# Patient Record
Sex: FEMALE | Race: BLACK OR AFRICAN AMERICAN | HISPANIC OR LATINO | URBAN - METROPOLITAN AREA
[De-identification: names, ages, dates, MRNs, and addresses within clinical notes are randomized per-mention and may not be internally consistent; named-entity substitution may affect disease eponyms.]

---

## 2019-10-28 ENCOUNTER — TRANSFERRED RECORDS (OUTPATIENT)
Dept: HEALTH INFORMATION MANAGEMENT | Facility: CLINIC | Age: 29
End: 2019-10-28

## 2019-10-28 LAB
HBV SURFACE AG SERPL QL IA: NONREACTIVE
HIV 1+2 AB+HIV1 P24 AG SERPL QL IA: NONREACTIVE
RUBELLA ANTIBODY IGG QUANTITATIVE: NORMAL IU/ML

## 2019-10-29 ENCOUNTER — TRANSFERRED RECORDS (OUTPATIENT)
Dept: MATERNAL FETAL MEDICINE | Facility: CLINIC | Age: 29
End: 2019-10-29

## 2019-10-30 ENCOUNTER — MEDICAL CORRESPONDENCE (OUTPATIENT)
Dept: MATERNAL FETAL MEDICINE | Facility: CLINIC | Age: 29
End: 2019-10-30

## 2019-10-31 ENCOUNTER — TRANSCRIBE ORDERS (OUTPATIENT)
Dept: MATERNAL FETAL MEDICINE | Facility: CLINIC | Age: 29
End: 2019-10-31

## 2019-10-31 ENCOUNTER — DOCUMENTATION ONLY (OUTPATIENT)
Dept: MATERNAL FETAL MEDICINE | Facility: CLINIC | Age: 29
End: 2019-10-31

## 2019-10-31 DIAGNOSIS — O30.109 TRIPLET PREGNANCY, ANTEPARTUM, UNSPECIFIED MULTIPLE GESTATION TYPE: Primary | ICD-10-CM

## 2019-10-31 DIAGNOSIS — O26.90 PREGNANCY RELATED CONDITION, ANTEPARTUM: Primary | ICD-10-CM

## 2019-11-01 ENCOUNTER — PRE VISIT (OUTPATIENT)
Dept: MATERNAL FETAL MEDICINE | Facility: CLINIC | Age: 29
End: 2019-11-01

## 2019-11-06 ENCOUNTER — OFFICE VISIT (OUTPATIENT)
Dept: MATERNAL FETAL MEDICINE | Facility: CLINIC | Age: 29
End: 2019-11-06
Payer: COMMERCIAL

## 2019-11-06 ENCOUNTER — HOSPITAL ENCOUNTER (OUTPATIENT)
Dept: ULTRASOUND IMAGING | Facility: CLINIC | Age: 29
Discharge: HOME | End: 2019-11-06
Admitting: NURSE PRACTITIONER
Payer: COMMERCIAL

## 2019-11-06 DIAGNOSIS — O30.109 TRIPLET PREGNANCY, ANTEPARTUM, UNSPECIFIED MULTIPLE GESTATION TYPE: Primary | ICD-10-CM

## 2019-11-06 DIAGNOSIS — O30.109 TRIPLET PREGNANCY, ANTEPARTUM, UNSPECIFIED MULTIPLE GESTATION TYPE: ICD-10-CM

## 2019-11-06 PROCEDURE — 76801 OB US < 14 WKS SINGLE FETUS: CPT

## 2019-11-06 NOTE — PROGRESS NOTES
Pt presents to [facility] for triplet pregnancy. Us done and reviewed by Dr. Beck. See epic for today's findings. Pt will return in 1 week for repeat us. Questions answered. Discharged stable at this time. Rowan Tavares RN

## 2019-11-11 ENCOUNTER — OFFICE VISIT (OUTPATIENT)
Dept: MATERNAL FETAL MEDICINE | Facility: CLINIC | Age: 29
End: 2019-11-11
Attending: OBSTETRICS & GYNECOLOGY
Payer: COMMERCIAL

## 2019-11-11 ENCOUNTER — HOSPITAL ENCOUNTER (OUTPATIENT)
Dept: ULTRASOUND IMAGING | Facility: CLINIC | Age: 29
End: 2019-11-11
Attending: OBSTETRICS & GYNECOLOGY
Payer: COMMERCIAL

## 2019-11-11 DIAGNOSIS — O30.101 TRIPLET GESTATION IN FIRST TRIMESTER, UNSPECIFIED MULTIPLE GESTATION TYPE: Primary | ICD-10-CM

## 2019-11-11 PROCEDURE — 76801 OB US < 14 WKS SINGLE FETUS: CPT

## 2019-11-20 ENCOUNTER — HOSPITAL ENCOUNTER (OUTPATIENT)
Dept: ULTRASOUND IMAGING | Facility: CLINIC | Age: 29
Discharge: HOME | End: 2019-11-20
Attending: OBSTETRICS & GYNECOLOGY | Admitting: OBSTETRICS & GYNECOLOGY
Payer: COMMERCIAL

## 2019-11-20 ENCOUNTER — OFFICE VISIT (OUTPATIENT)
Dept: MATERNAL FETAL MEDICINE | Facility: CLINIC | Age: 29
End: 2019-11-20
Attending: OBSTETRICS & GYNECOLOGY
Payer: COMMERCIAL

## 2019-11-20 DIAGNOSIS — O30.101 TRIPLET GESTATION IN FIRST TRIMESTER, UNSPECIFIED MULTIPLE GESTATION TYPE: ICD-10-CM

## 2019-11-20 DIAGNOSIS — O30.111 TRIPLET GESTATION WITH TWO OR MORE MONOCHORIONIC FETUSES IN FIRST TRIMESTER: Primary | ICD-10-CM

## 2019-11-20 PROCEDURE — 76801 OB US < 14 WKS SINGLE FETUS: CPT

## 2019-11-20 NOTE — NURSING NOTE
Sobeida, accompanied by her SO Mathew, seen in clinic today for NT US at 12w2d gestation for pregnancy c/b Dri/Tri triplets (see reports/notes). Dr. Bowens in to see pt, reviewed US findings and POC. Plan is to follow up in 2 weeks for a limited US and 1st OB visit (also to have formal triplet consult). PCC contact card given to call with any questions or concerns. Sobeida scheduled appointments, discharged ambulatory and stable.  Juana Kelly RN

## 2019-11-21 NOTE — PROGRESS NOTES
"Please see \"Imaging\" tab under \"Chart Review\" for details of today's ultrasound.    Terrell Bowens M.D.  Specialist in Maternal-Fetal Medicine     "

## 2019-12-02 ENCOUNTER — OFFICE VISIT (OUTPATIENT)
Dept: MATERNAL FETAL MEDICINE | Facility: CLINIC | Age: 29
End: 2019-12-02
Attending: OBSTETRICS & GYNECOLOGY
Payer: COMMERCIAL

## 2019-12-02 ENCOUNTER — HOSPITAL ENCOUNTER (OUTPATIENT)
Dept: ULTRASOUND IMAGING | Facility: CLINIC | Age: 29
Discharge: HOME | End: 2019-12-02
Attending: OBSTETRICS & GYNECOLOGY | Admitting: OBSTETRICS & GYNECOLOGY
Payer: COMMERCIAL

## 2019-12-02 VITALS
HEIGHT: 67 IN | WEIGHT: 222.2 LBS | SYSTOLIC BLOOD PRESSURE: 117 MMHG | OXYGEN SATURATION: 97 % | BODY MASS INDEX: 34.88 KG/M2 | HEART RATE: 84 BPM | RESPIRATION RATE: 16 BRPM | DIASTOLIC BLOOD PRESSURE: 61 MMHG

## 2019-12-02 DIAGNOSIS — O30.111 TRIPLET GESTATION WITH TWO OR MORE MONOCHORIONIC FETUSES IN FIRST TRIMESTER: Primary | ICD-10-CM

## 2019-12-02 DIAGNOSIS — O30.111 TRIPLET GESTATION WITH TWO OR MORE MONOCHORIONIC FETUSES IN FIRST TRIMESTER: ICD-10-CM

## 2019-12-02 PROCEDURE — G0463 HOSPITAL OUTPT CLINIC VISIT: HCPCS

## 2019-12-02 PROCEDURE — 76815 OB US LIMITED FETUS(S): CPT

## 2019-12-02 RX ORDER — ASPIRIN 81 MG/1
81 TABLET ORAL DAILY
Status: ON HOLD | COMMUNITY
End: 2020-04-18

## 2019-12-02 ASSESSMENT — MIFFLIN-ST. JEOR: SCORE: 1775.52

## 2019-12-02 NOTE — NURSING NOTE
Sobeida seen in clinic today accompanied by partner and daughter for OB visit at 14w 0d gestation for pregnancy complicated by di/tri triplets (see report/notes). VSS. Pt reports + fetal movement. Pt denies bldg/lof/change in discharge/contractions/headache/vision changes/chest pain/SOB/edema. Pt given new OB folder and contact information, information reviewed, pt VU. Pt reports sciatic pain, offered PT, patient declined. Pt also reports pain during intercourse, declined exam by Dr. Beck. Dr. Beck met with pt and discussed POC, see note. Plan to continue every other week ultrasounds with cervical lengths/TTTS and OBV. Pt will start ASA today and plan for valtrex at 32 weeks. Will do GCT at next visit and 24 weeks. Future visits scheduled. Pt discharged stable and ambulatory.      Shanta Cook RN

## 2019-12-03 NOTE — PROGRESS NOTES
"MFM NOB Visit.     CC: Di/Tri triplet pregnancy    Sobeida is a 28 y/o  @ 14 0/7 weeks with a known Dichorionic/Triamniotic Triplet gestation based on LMP c/w 8 week US. Spontaneous conception.  Initially triplet 3 (ellis) was complicated by lagging growth from 8-11 weeks.  At time of NT assessment Triplet 3 had a CRL >45 mm.  All fetuses had normal NT measurements and NB are present.     She feels well today.  Does have low pelvic pain with intercourse.  No other complaints.  No N/V.  Not interested in multifetal pregnancy reduction.  Has completed a lot of online reading about the mono/di twins and the potential complications.      Pregnancy complicated by:  - Dichorionic Triamniontic Triplet Gestation  - Obesity- BMI 34  - Genital HSV    Obstetrics History:  - Full term   Prior induced ab     Gynecologic History:  - Hx of HSV  - PAP 2019- wnl.      Past Surgical History:  - [place] teeth  - Breast augmentation   - abdominal liposuction     Current Medications:  Prenatal vitamin     Allergies:  NKDA     Social History:   Occupation: flexible job  Denies t/e/d use  Good social support from FOB     Family History:  Denies history of genetic disorders, preeeclampsia, thromboembolic disease, bleeding disorders, mental retardation     ROS:  10-point ROS negative except as in HPI      PHYSICAL EXAM:  /61 (BP Location: Left arm, Patient Position: Sitting, Cuff Size: Adult Large)   Pulse 84   Resp 16   Ht 1.702 m (5' 7\")   Wt 100.8 kg (222 lb 3.2 oz)   LMP 2019   SpO2 97%   BMI 34.80 kg/m       Gen: NAD, well appearing  CV: Regular rate  Pulm: Non-labored breathing  Abdomen: Gravid, non-tender, non-distended  Extremities: no edema    See initial exam in Media from [place] [facility] for further details.      ASSESSMENT/PLAN:  Sobeida Fountain is a 28 y/o  @ 14 0/7 weeks with Di/Tri triplet gestation here for NOB visit.      # Prenatal care:  - PNC: Prenatal labs WNL. See " Lab tab for details.   - Genetics: understands limitations of genetic testing on Triplets.  Had normal NT measurements and NB on all fetuses.  Declines invasive testing.   - Immunizations: s/p Flu vaccine on 10/28/19 at [place] [facility]. Tdap at 28 weeks  - Consults: NICU   - Mode of delivery:  section at 35-36 weeks gestation or sooner as clinically indicated  - Prophylaxis: Aspirin (81 mg) daily staring at 12 weeks gestation. Discussed today.      # Dichorionic triamniotic pregnancy  - Discussed maternal risks of triplet gestation including  delivery, gestational diabetes, gestational hypertension and preeclampsia. We also discussed increased risk for requiring a  delivery and its attendant risks for hemorrhage and infection compared with vaginal delivery. Although the relative risks for these complications are relatively small, they are significant.   - Discussed the fetal and  risks including but not limited to  delivery and its complications of CP, IVH, NEC, RDS, and  death. The patient related risk with triplets without a history of  delivery is approximately 3x background risk for women with ellis pregnancy and without history of  delivery (10-12%).   - Multifetal pregnancy reduction was discussed. She and her partner decline reduction and wish to proceed with triplet pregnancy.  - Discussed recommendations of low dose aspirin for preeclampsia prevention, ideally started between 12-16 weeks  - Discussed early screening for gestational diabetes (will do next visit) and again at 28 weeks gestation.  - Discussed risk for cervical shortening and need for inpatient surveillance if cervical shortening is detected after viability is reached, given the limited options for prevention of  birth in this population, the emphasis is on preparing the fetuses for  birth if there is imminent risk of  birth as well as transfer to center with  NICU availability at the corresponding gestational age.  Will plan for cervical length assessment starting at 16 weeks gestation.  - Discussed increased nutritional requirements for pregnancy including PNV, calcium with vitamin supplementation and iron supplementation. Patient is currently taking 2 PNV.   - Discussed recommended weight gain in pregnancy for triplet pregnancy is between 30-50 Ibs.  - Discussed recommendation for detailed fetal anatomy scan at 18-20 weeks.  - Discussed recommendations for fetal growth scans every 4 weeks unless evidence of SGA develops in any of the fetuses.   - Discussed recommendation for  section between 35-36 weeks gestation if not clinically indicated before that time. Reviewed that the average delivery time is often between 30-32 weeks gestation.     # Mono/Di twin gestation  We also reviewed the unique complications associated with Mono/Di twins including increased risk for birth defects and recommendation for fetal echos at 20-22 weeks.  We discussed TTTS and selective fetal growth restriction.  Both of these conditions develop in 10-15% of Mono/Di twin pregnancies.  We reviewed the screening for TTTS including q 2 week US for MVP, Dopplers beginning at 16 weeks.  Briefly reviewed the possible treatment options depending on gestational age of development including selective fetoscopic laser ablation and amnioreduction.  Discussed that an additional fetus may increase the risks for various procedures to be performed.  We also discussed sFGR and the management.     The patient was seen for an established outpatient visit.  I spent a total of 25 minutes face to face with Sobeida Fountain during today's visit. Over 50% of this time was spent counseling the patient and/or coordinating care Di/Tri triplet gestation.     Leni Beck DO [facility]  Maternal Fetal Medicine Specialist  Pager: [phone]  Mobile: [phone]

## 2019-12-10 ENCOUNTER — TELEPHONE (OUTPATIENT)
Dept: MATERNAL FETAL MEDICINE | Facility: CLINIC | Age: 29
End: 2019-12-10

## 2019-12-10 NOTE — TELEPHONE ENCOUNTER
Pt calling to inquire about flying/traveling in the month of January. Pt is pregnant with triplets, 1&2 mono/di pair, currently 15w1d. Dr. Beck informed and advised pt not to fly or travel due to nature of triplet pregnancy with mono/di twin pair.      Angelika Lopez RN

## 2019-12-18 ENCOUNTER — OFFICE VISIT (OUTPATIENT)
Dept: MATERNAL FETAL MEDICINE | Facility: CLINIC | Age: 29
End: 2019-12-18
Attending: OBSTETRICS & GYNECOLOGY
Payer: COMMERCIAL

## 2019-12-18 ENCOUNTER — HOSPITAL ENCOUNTER (OUTPATIENT)
Dept: ULTRASOUND IMAGING | Facility: CLINIC | Age: 29
Discharge: HOME | End: 2019-12-18
Attending: OBSTETRICS & GYNECOLOGY | Admitting: OBSTETRICS & GYNECOLOGY
Payer: COMMERCIAL

## 2019-12-18 ENCOUNTER — TELEPHONE (OUTPATIENT)
Dept: MATERNAL FETAL MEDICINE | Facility: CLINIC | Age: 29
End: 2019-12-18

## 2019-12-18 VITALS
BODY MASS INDEX: 34.93 KG/M2 | WEIGHT: 223 LBS | DIASTOLIC BLOOD PRESSURE: 60 MMHG | OXYGEN SATURATION: 98 % | SYSTOLIC BLOOD PRESSURE: 105 MMHG | RESPIRATION RATE: 18 BRPM | HEART RATE: 105 BPM

## 2019-12-18 DIAGNOSIS — O30.111 TRIPLET GESTATION WITH TWO OR MORE MONOCHORIONIC FETUSES IN FIRST TRIMESTER: ICD-10-CM

## 2019-12-18 DIAGNOSIS — O99.210 OBESITY IN PREGNANCY: Primary | ICD-10-CM

## 2019-12-18 LAB — GLUCOSE 1H P 50 G GLC PO SERPL-MCNC: 130 MG/DL (ref 60–129)

## 2019-12-18 PROCEDURE — 76817 TRANSVAGINAL US OBSTETRIC: CPT | Performed by: OBSTETRICS & GYNECOLOGY

## 2019-12-18 PROCEDURE — 82950 GLUCOSE TEST: CPT | Performed by: OBSTETRICS & GYNECOLOGY

## 2019-12-18 PROCEDURE — G0463 HOSPITAL OUTPT CLINIC VISIT: HCPCS | Mod: 25,ZF

## 2019-12-18 PROCEDURE — 82952 GTT-ADDED SAMPLES: CPT | Performed by: OBSTETRICS & GYNECOLOGY

## 2019-12-18 PROCEDURE — 76805 OB US >/= 14 WKS SNGL FETUS: CPT

## 2019-12-18 PROCEDURE — 36415 COLL VENOUS BLD VENIPUNCTURE: CPT | Performed by: OBSTETRICS & GYNECOLOGY

## 2019-12-18 RX ORDER — CALCIUM CARBONATE 500 MG/1
1 TABLET, CHEWABLE ORAL 2 TIMES DAILY
COMMUNITY
End: 2023-09-16

## 2019-12-18 ASSESSMENT — PAIN SCALES - GENERAL: PAINLEVEL: NO PAIN (0)

## 2019-12-18 NOTE — TELEPHONE ENCOUNTER
Called to inform Sobeida of her 1 hour GCT result is 130 and that we will need her to do a 3 hour. Dr. Weiss aware. Instructed pt to do this before her next appointment with M. Pt agreeable to plan. Orders placed and instructed pt to be fasting and she can go to the lab M-F starting at 0730. Sobeida has no further questions, has PCC# to call if needed. Assured Sobeida we will watch for her results and notify her ASAP.   Juana Kelly RN

## 2019-12-18 NOTE — PROGRESS NOTES
MFM Prenatal Visit.     CC: Di/Tri triplet pregnancy    Sobeida is a 30 y/o  @ 16 2/7 weeks with a known Dichorionic/Triamniotic Triplet gestation based on LMP c/w 8 week US. Spontaneous conception.  Initially triplet 3 (ellis) was complicated by lagging growth from 8-11 weeks.  At time of NT assessment Triplet 3 had a CRL >45 mm.  All fetuses had normal NT measurements and NB are present.     She feels well today. No N/V.  Not interested in multifetal pregnancy reduction.  Has completed a lot of online reading about the mono/di twins and the potential complications.     We discussed findings on ultrasound today:  Fetal growth has been concordant between all three triplets, with fetus 2 of monochorionic pair (1 and 2) > fetus 1.    No evidence of TTTS or growth restriction.     Regarding the EIF in fetus 3, we discussed the limitations and decreased detection rates for  aneuploidy screening in triplet pregnancies. We reviewed option for diagnostic testing to detect aneuploidy. Patient declines testing. Discussed finding of echogenic intracardiac focus and its use as a soft marker for aneuploidy, specifically for Trisomy 21 in fetus 3. We discussed age adjusted risk for aneuploidy based on this soft marker. Patient had previously declined aneuploidy risk assessment and after offering genetic counseling and diagnostic testing, the patient declined. The patient expressed a clear understanding of our conversation.       Pregnancy complicated by:  - Dichorionic Triamniontic Triplet Gestation  - Obesity- BMI 34  - Genital HSV    Obstetrics History:  - Full term   Prior induced ab     Gynecologic History:  - Hx of HSV  - PAP 2019- wnl.      Past Surgical History:  - [place] teeth  - Breast augmentation   - abdominal liposuction     Current Medications:  Prenatal vitamin     Allergies:  NKDA     Social History:   Occupation: flexible job  Denies t/e/d use  Good social support from  FOB     Family History:  Denies history of genetic disorders, preeeclampsia, thromboembolic disease, bleeding disorders, mental retardation     ROS:  10-point ROS negative except as in HPI      PHYSICAL EXAM:  /60 (BP Location: Left arm, Patient Position: Sitting, Cuff Size: Adult Large)   Pulse 105   Resp 18   Wt 101.2 kg (223 lb)   LMP 2019   SpO2 98%   BMI 34.93 kg/m      Gen: NAD, well appearing       ASSESSMENT/PLAN:  Sobeida Fountain is a 30 y/o  @ 16 2/7 weeks with Di/Tri triplet gestation.      # Prenatal care:  - PNC: Prenatal labs WNL. See Lab tab for details.   - Genetics: understands limitations of genetic testing on Triplets.  Had normal NT measurements and NB on all fetuses.  Declines invasive testing.   - Immunizations: s/p Flu vaccine on 10/28/19 at [place] [facility]. Tdap at 28 weeks  - Consults: NICU   - Mode of delivery:  section at 35-36 weeks gestation or sooner as clinically indicated  - Prophylaxis: Aspirin (81 mg) daily staring at 12 weeks gestation. Discussed today.      # Dichorionic triamniotic pregnancy  - Discussed maternal risks of triplet gestation including  delivery, gestational diabetes, gestational hypertension and preeclampsia. We also discussed increased risk for requiring a  delivery and its attendant risks for hemorrhage and infection compared with vaginal delivery. Although the relative risks for these complications are relatively small, they are significant.   - Discussed the fetal and  risks including but not limited to  delivery and its complications of CP, IVH, NEC, RDS, and  death. The patient related risk with triplets without a history of  delivery is approximately 3x background risk for women with ellis pregnancy and without history of  delivery (10-12%).   - Multifetal pregnancy reduction was discussed. She and her partner decline reduction and wish to proceed with triplet  pregnancy.  - Discussed recommendations of low dose aspirin for preeclampsia prevention, ideally started between 12-16 weeks  - Discussed early screening for gestational diabetes (will do next visit) and again at 28 weeks gestation.  - Discussed risk for cervical shortening and need for inpatient surveillance if cervical shortening is detected after viability is reached, given the limited options for prevention of  birth in this population, the emphasis is on preparing the fetuses for  birth if there is imminent risk of  birth as well as transfer to center with NICU availability at the corresponding gestational age.  Will plan for cervical length assessment starting at 16 weeks gestation.  - Discussed increased nutritional requirements for pregnancy including PNV, calcium with vitamin supplementation and iron supplementation. Patient is currently taking 2 PNV.   - Discussed recommended weight gain in pregnancy for triplet pregnancy is between 30-50 Ibs.  - Discussed recommendation for detailed fetal anatomy scan at 18-20 weeks.  - Discussed recommendations for fetal growth scans every 4 weeks unless evidence of SGA develops in any of the fetuses.   - Discussed recommendation for  section between 35-36 weeks gestation if not clinically indicated before that time. Reviewed that the average delivery time is often between 30-32 weeks gestation.  We discussed that although the cervical length is adequate, pregnancy risk for PTB is dependent on multifetal pregnancy and is elevated despite normal cervical length.     # Mono/Di twin gestation  We also reviewed the unique complications associated with Mono/Di twins including increased risk for birth defects and recommendation for fetal echos at 20-22 weeks.  We discussed TTTS and selective fetal growth restriction.  Both of these conditions develop in 10-15% of Mono/Di twin pregnancies.  We reviewed the screening for TTTS including q 2 week US  for MVP, Dopplers beginning at 16 weeks.  Briefly reviewed the possible treatment options depending on gestational age of development including selective fetoscopic laser ablation and amnioreduction.  Discussed that an additional fetus may increase the risks for various procedures to be performed.  We also discussed sFGR and the management.     The patient was seen for an established outpatient visit.  I spent a total of 15 minutes face to face with Sobeida Fountain during today's visit. Over 50% of this time was spent counseling the patient and/or coordinating care Di/Tri triplet gestation.    Forrest Weiss M.D.  Maternal Fetal Medicine

## 2019-12-18 NOTE — NURSING NOTE
Sobeida, accompanied by her , seen in clinic today for a 2/3 complete US and OB visit at 16w2d gestation (see report/notes). VSS. Pt reports positive fetal movement for all 3 babies. Pt denies bldg/lof/change in discharge/contractions/headache/vision changes/chest pain/SOB/edema. Pt reports that her sciatic pain has improved but states her plevis continues to be sore. P[facility] and [facility] phone number contact card given. Dr. Weiss met with pt and discussed US and POC. Plan to return to clinic in 2 weeks for f/u US and OBV. 50g glucola given today and sent to lab for early 1 hour GCT. Pt discharged stable and ambulatory.    Juana Kelly RN

## 2019-12-23 ENCOUNTER — TELEPHONE (OUTPATIENT)
Dept: MATERNAL FETAL MEDICINE | Facility: CLINIC | Age: 29
End: 2019-12-23

## 2019-12-23 DIAGNOSIS — O99.210 OBESITY IN PREGNANCY: ICD-10-CM

## 2019-12-23 DIAGNOSIS — O30.111 TRIPLET GESTATION WITH TWO OR MORE MONOCHORIONIC FETUSES IN FIRST TRIMESTER: ICD-10-CM

## 2019-12-23 LAB
GLUCOSE 1H P 100 G GLC PO SERPL-MCNC: 174 MG/DL (ref 60–179)
GLUCOSE 2H P 100 G GLC PO SERPL-MCNC: 144 MG/DL (ref 60–154)
GLUCOSE 3H P 100 G GLC PO SERPL-MCNC: 109 MG/DL (ref 60–139)
GLUCOSE P FAST SERPL-MCNC: 78 MG/DL (ref 60–94)

## 2019-12-23 PROCEDURE — 36415 COLL VENOUS BLD VENIPUNCTURE: CPT | Performed by: OBSTETRICS & GYNECOLOGY

## 2019-12-23 PROCEDURE — 82952 GTT-ADDED SAMPLES: CPT | Performed by: OBSTETRICS & GYNECOLOGY

## 2019-12-23 PROCEDURE — 82951 GLUCOSE TOLERANCE TEST (GTT): CPT | Performed by: OBSTETRICS & GYNECOLOGY

## 2019-12-23 NOTE — TELEPHONE ENCOUNTER
Patient called in wondering about her 3hr GTT results. Patient passed her 3 hr GTT. Pt aware and happy.  Angelika Garduno RN

## 2019-12-23 NOTE — TELEPHONE ENCOUNTER
Patient called in with complaints of HSV outbreak. Patient reports she has Acyclovir 400 mg tablets at home. Patient was given 180 tablets as she was diagnosed earlier this summer and never took any pills. Patient was prescribed 400 mg BID, but wants to know what to take now that she has an outbreak.  Patient to take Acyclovir 400 mg TID for 7-10 days and then can go to 400mg BID for suppression for the remainder of pregnancy as we don't want pt to have outbreak in pregnancy.  Writer spoke with Dr. Flowers from [facility] for instructions on medication dosing. Patient verbalized understanding. Angelika Garduno RN

## 2019-12-30 ENCOUNTER — OFFICE VISIT (OUTPATIENT)
Dept: MATERNAL FETAL MEDICINE | Facility: CLINIC | Age: 29
End: 2019-12-30
Attending: OBSTETRICS & GYNECOLOGY
Payer: COMMERCIAL

## 2019-12-30 ENCOUNTER — HOSPITAL ENCOUNTER (OUTPATIENT)
Dept: ULTRASOUND IMAGING | Facility: CLINIC | Age: 29
Discharge: HOME | End: 2019-12-30
Attending: OBSTETRICS & GYNECOLOGY | Admitting: OBSTETRICS & GYNECOLOGY
Payer: COMMERCIAL

## 2019-12-30 VITALS
SYSTOLIC BLOOD PRESSURE: 120 MMHG | RESPIRATION RATE: 18 BRPM | DIASTOLIC BLOOD PRESSURE: 61 MMHG | OXYGEN SATURATION: 99 % | BODY MASS INDEX: 35.22 KG/M2 | WEIGHT: 224.9 LBS

## 2019-12-30 DIAGNOSIS — O30.111 TRIPLET GESTATION WITH TWO OR MORE MONOCHORIONIC FETUSES IN FIRST TRIMESTER: ICD-10-CM

## 2019-12-30 PROCEDURE — G0463 HOSPITAL OUTPT CLINIC VISIT: HCPCS | Mod: 25,ZF

## 2019-12-30 PROCEDURE — 76817 TRANSVAGINAL US OBSTETRIC: CPT | Performed by: OBSTETRICS & GYNECOLOGY

## 2019-12-30 PROCEDURE — 76820 UMBILICAL ARTERY ECHO: CPT | Performed by: OBSTETRICS & GYNECOLOGY

## 2019-12-30 PROCEDURE — 76816 OB US FOLLOW-UP PER FETUS: CPT | Mod: 59

## 2019-12-30 RX ORDER — ACYCLOVIR 400 MG/1
400 TABLET ORAL 2 TIMES DAILY
COMMUNITY
End: 2020-04-13

## 2019-12-30 ASSESSMENT — PAIN SCALES - GENERAL: PAINLEVEL: NO PAIN (0)

## 2020-01-04 NOTE — PROGRESS NOTES
Maternal-Fetal Medicine Prenatal Visit    Sobeida Fountain MRN# [id number]   Age: 29 year old  Estimated Date of Delivery: 2020            Gestational age: 18w0d YOB: 1990             Subjective:        Pt denies LOF, VB, CTX.  Reports + FM.   Recently had genital HSV outbreak- now on suppression.  Feeling well overall.           Objective:        /61 (BP Location: Left arm, Patient Position: Sitting, Cuff Size: Adult Large)   Resp 18   Wt 102 kg (224 lb 14.4 oz)   LMP 2019   SpO2 99%   BMI 35.22 kg/m         No results found for this or any previous visit (from the past 24 hour(s)).             Gen: NAD, alert, comfortable       Abdomen: Gravid, Soft, Non-tender          Assessment:        29 year old y.o.  at 18w0d by LMP       1) Di/Tri triplet gestation.  Continue q 2 week TTTS surveillance and cervical length US due to triplet pregnancy.  Fetus 1 with velamentous CI.       2) Recent HSV outbreak- on acyclovir suppression.        3) Failed early GCT, passed GTT.  Repeat GTT at 26 weeks.        4) Fetal echos on           Attestation:   The patient was seen for an established outpatient visit.  I spent a total of 10 minutes face to face with Sobeida Fountain during today's office visit.  Over (>50%) was spent on counseling the patient and/or coordinating care regarding Di/Tri triplet gestation.        Leni Beck, DO  Maternal-Fetal Medicine  January 3, 2020

## 2020-01-13 ENCOUNTER — HOSPITAL ENCOUNTER (OUTPATIENT)
Dept: ULTRASOUND IMAGING | Facility: CLINIC | Age: 30
Discharge: HOME | End: 2020-01-13
Attending: OBSTETRICS & GYNECOLOGY | Admitting: OBSTETRICS & GYNECOLOGY
Payer: COMMERCIAL

## 2020-01-13 ENCOUNTER — OFFICE VISIT (OUTPATIENT)
Dept: MATERNAL FETAL MEDICINE | Facility: CLINIC | Age: 30
End: 2020-01-13
Attending: OBSTETRICS & GYNECOLOGY
Payer: COMMERCIAL

## 2020-01-13 VITALS
DIASTOLIC BLOOD PRESSURE: 62 MMHG | SYSTOLIC BLOOD PRESSURE: 109 MMHG | BODY MASS INDEX: 35.37 KG/M2 | RESPIRATION RATE: 18 BRPM | WEIGHT: 225.8 LBS | OXYGEN SATURATION: 100 % | HEART RATE: 98 BPM

## 2020-01-13 DIAGNOSIS — O30.111 TRIPLET GESTATION WITH TWO OR MORE MONOCHORIONIC FETUSES IN FIRST TRIMESTER: ICD-10-CM

## 2020-01-13 PROCEDURE — 76820 UMBILICAL ARTERY ECHO: CPT | Performed by: OBSTETRICS & GYNECOLOGY

## 2020-01-13 PROCEDURE — 76812 OB US DETAILED ADDL FETUS: CPT

## 2020-01-13 PROCEDURE — G0463 HOSPITAL OUTPT CLINIC VISIT: HCPCS | Mod: 25,ZF

## 2020-01-13 ASSESSMENT — PAIN SCALES - GENERAL: PAINLEVEL: NO PAIN (0)

## 2020-01-13 NOTE — PROGRESS NOTES
Maternal-Fetal Medicine Prenatal Visit    Sobeida Fountain MRN# [id number]   Age: 29 year old  Estimated Date of Delivery: 2020            Gestational age: 20w0d YOB: 1990             Subjective:        Pt denies LOF, VB, CTX.  Reports + FM.   Complains of insomnia.          Objective:      LMP 2019          Gen: NAD, alert, comfortable       Abdomen: Gravid, Soft, Non-tender                 Assessment:        29 year old y.o.  at 20w0d by LMP       1) Di/Tri triplet gestation.  Continue q 2 week TTTS surveillance and cervical length US due to triplet pregnancy.  Fetus 1 with velamentous CI.       2) Recent HSV outbreak- on acyclovir suppression.        3) Failed early GCT, passed GTT.  Repeat GTT at 26 weeks.        4) Fetal echos on        5) Benadryl 25 mg at at bedtime for sleep induction          Attestation:   The patient was seen for an established outpatient visit.  I spent a total of 10 minutes face to face with Sobeida Fountain during today's office visit.  Over (>50%) was spent on counseling the patient and/or coordinating care regarding Di/Tri triplet gestation.        Forrest Weiss  Maternal Fetal Medicine

## 2020-01-13 NOTE — NURSING NOTE
Sobeida seen in clinic today for L2/TV US and OB visit at 20w0d gestation for pregnancy complicated by di/tri triplets (see report/notes). VSS. Pt reports positive fetal movement x3. Pt denies bldg/lof/change in discharge/contractions/headache/vision changes/chest pain/SOB/edema. Sobeida reports feelings of increased pelvic pressure and she is having trouble sleeping. Dr. Weiss met with pt and discussed US and POC. Plan for TTTS/TV US, Fetal Echos and OB visit in 2 weeks. Encouraged pt to try a pregnancy belt to help with increased pelvic pressure and Benadryl 25 mg at HS to help sleeping.  Future visits already scheduled. Pt discharged stable and ambulatory.    Juana Kelly RN

## 2020-01-27 ENCOUNTER — HOSPITAL ENCOUNTER (OUTPATIENT)
Dept: ULTRASOUND IMAGING | Facility: CLINIC | Age: 30
Discharge: HOME | End: 2020-01-27
Attending: OBSTETRICS & GYNECOLOGY | Admitting: OBSTETRICS & GYNECOLOGY
Payer: COMMERCIAL

## 2020-01-27 ENCOUNTER — OFFICE VISIT (OUTPATIENT)
Dept: MATERNAL FETAL MEDICINE | Facility: CLINIC | Age: 30
End: 2020-01-27
Payer: COMMERCIAL

## 2020-01-27 VITALS
WEIGHT: 227.5 LBS | RESPIRATION RATE: 16 BRPM | OXYGEN SATURATION: 99 % | SYSTOLIC BLOOD PRESSURE: 119 MMHG | HEART RATE: 103 BPM | BODY MASS INDEX: 35.63 KG/M2 | DIASTOLIC BLOOD PRESSURE: 79 MMHG

## 2020-01-27 DIAGNOSIS — O30.109: ICD-10-CM

## 2020-01-27 DIAGNOSIS — O30.111 TRIPLET GESTATION WITH TWO OR MORE MONOCHORIONIC FETUSES IN FIRST TRIMESTER: Primary | ICD-10-CM

## 2020-01-27 DIAGNOSIS — O35.9XX0 SUSPECTED FETAL ANOMALY, ANTEPARTUM, SINGLE OR UNSPECIFIED FETUS: ICD-10-CM

## 2020-01-27 DIAGNOSIS — O30.111 TRIPLET GESTATION WITH TWO OR MORE MONOCHORIONIC FETUSES IN FIRST TRIMESTER: ICD-10-CM

## 2020-01-27 PROCEDURE — G0463 HOSPITAL OUTPT CLINIC VISIT: HCPCS | Mod: 25,ZF

## 2020-01-27 PROCEDURE — 76820 UMBILICAL ARTERY ECHO: CPT | Mod: 59

## 2020-01-27 PROCEDURE — 76816 OB US FOLLOW-UP PER FETUS: CPT | Mod: 59

## 2020-01-27 PROCEDURE — 76821 MIDDLE CEREBRAL ARTERY ECHO: CPT | Mod: 59 | Performed by: OBSTETRICS & GYNECOLOGY

## 2020-01-27 PROCEDURE — 76820 UMBILICAL ARTERY ECHO: CPT

## 2020-01-27 ASSESSMENT — PAIN SCALES - GENERAL: PAINLEVEL: NO PAIN (0)

## 2020-01-27 NOTE — PROGRESS NOTES
Maternal-Fetal Medicine Prenatal Visit    Sobeida Cadena MRN# [id number]   Age: 29 year old  Estimated Date of Delivery: 2020            Gestational age: 22w0d YOB: 1990             Subjective:        Pt denies LOF, VB, CTX.  Reports + FM for all fetuses.  Patient is having a lot more heartburn and tums aren't working as well.  Having some pelvic pressure with walking, sharp lateral ligament type pain.            Objective:        /79 (BP Location: Left arm, Patient Position: Sitting, Cuff Size: Adult Regular)   Pulse 103   Resp 16   Wt 103.2 kg (227 lb 8 oz)   LMP 2019   SpO2 99%   BMI 35.63 kg/m           Results for orders placed or performed during the hospital encounter of 20 (from the past 24 hour(s))   [facility] Comprehensive Triplets F/U    Narrative            Comp Follow Up  ---------------------------------------------------------------------------------------------------------  Pat. Name: SOBEIDA CADENA       Study Date:  2020 10:25am  Pat. NO:  [id number]        Referring  MD: EDDIE TOSCANO  Site:  [facility]       Sonographer: Cary Lua RDMS  :  1990        Age:   29  ---------------------------------------------------------------------------------------------------------    INDICATION  ---------------------------------------------------------------------------------------------------------  Dichorionic, triamniotic triplet pregnancy.      METHOD  ---------------------------------------------------------------------------------------------------------  Transvaginal ultrasound examination was required to adequately complete the exam. Transabdominal ultrasound examination      PREGNANCY  ---------------------------------------------------------------------------------------------------------  Triplet pregnancy, Triplet pregnancy. Dichorionic Triamniotic, fetuses 1 & 2 are a monochorionic pair. Number of fetuses:  3      DATING  ---------------------------------------------------------------------------------------------------------                                           Date                                Details                                                                                      Gest. age                      DANIELE  LMP                                  8/26/2019                                                                                                                         22 w + 0 d                     6/1/2020  Prior assessment               10/28/2019                       GA: 8 w + 4 d                                                                            21 w + 4 d                     6/4/2020  Assigned dating                  Dating performed on 12/2/2019, based on the LMP                                                              22 w + 0 d                     6/1/2020      Fetus 1: GENERAL EVALUATION  ---------------------------------------------------------------------------------------------------------  Cardiac activity present.  bpm.  Fetal movements visualized.  Presentation cephalic, midline, presenting.  Placenta anterior, thin dividing membrane.  Umbilical cord previously studied.  Amniotic fluid Amount of AF: normal. MVP 5.3 cm.      Fetus 2: GENERAL EVALUATION  ---------------------------------------------------------------------------------------------------------  Cardiac activity present.  bpm.  Fetal movements visualized.  Presentation breech, maternal right.  Placenta anterior, thin dividing membrane.  Umbilical cord previously studied.  Amniotic fluid Amount of AF: normal. MVP 4.5 cm.      Fetus 3: GENERAL EVALUATION  ---------------------------------------------------------------------------------------------------------  Cardiac activity present.  bpm.  Fetal movements visualized.  Presentation breech, maternal left.  Placenta anterior, thick  dividing membrane.  Umbilical cord previously studied.  Amniotic fluid Amount of AF: normal. MVP 4.9 cm.      Fetus 1: FETAL ANATOMY  ---------------------------------------------------------------------------------------------------------  The following structures were visualized:  Abdomen                             Bladder.    Gender: male.      Fetus 2: FETAL ANATOMY  ---------------------------------------------------------------------------------------------------------  The following structures were visualized:  Abdomen                             Bladder.    Gender: male.      Fetus 3: FETAL ANATOMY  ---------------------------------------------------------------------------------------------------------  Gender: female.      Fetus 1: FETAL DOPPLER  ---------------------------------------------------------------------------------------------------------  Umbilical Artery: abnormal, Intermittent Elevated SD ratio  S / D                                       5.90                                                   98%        Mili  HR                                          154                     bpm    Mid Cerebral Artery:  PS                                          29.37                  cm/s  PS                                          1.05                    MoM      Fetus 2: FETAL DOPPLER  ---------------------------------------------------------------------------------------------------------  Umbilical Artery: normal  S / D                                       4.73                                                   83%        Mili  HR                                          145                     bpm    Mid Cerebral Artery:  PS                                          28.94                  cm/s  PS                                          1.04                    MoM      MATERNAL  STRUCTURES  ---------------------------------------------------------------------------------------------------------  Cervix                                  Visualized                                             Appearance: Appears Closed                                             Approach - Transvaginal: Cervical length 41.8 mm      RECOMMENDATION  ---------------------------------------------------------------------------------------------------------  We discussed the findings on today's ultrasound with the patient.    A repeat ultrasound has been scheduled here in 1 weeks to reevaluate for TTTS and UA Dopplers given the intermittent elevation in the S/D ratio today for Fetus 1.    see epic for further details of today's visit.    Thank you for the opportunity to participate in the care of this patient. If you have questions regarding today's evaluation or if we can be of further service, please contact the  [facility].    **Fetal anomalies may be present but not detected**        Impression    IMPRESSION  ---------------------------------------------------------------------------------------------------------  1) Dichorionic Triamniotic Triplet Gestation at 22 0/7 weeks. Fetus 1 and 2 are the mono/di pair.  2) Fetus 1 with normal appearing bladder and amniotic fluid volume. Intermittently elevated S/D ratio in UA Doppler. The MCA Doppler is normal.  3) Fetus 2 with normal appearing bladder and amniotic fluid volume. Normal UA Dopplers and MCA Dopplers.  4) Fetus 3 with normal MVP.  5) Cervix is long and closed on transvaginal imaging.           Labs:   See Media for labs           Gen: NAD, alert, comfortable       Abdomen: Gravid, Soft, Non-tender       Ext: no edema          Assessment/Plan:        29 year old y.o.  at 22w0d        1) Di/Tri triplet pregnancy.  Mildly abnl Dopplers today for Fetus 1, no signs of TTTS.  Plan repeat assessment next week.  Reviewed that  intermittently elevated S/D ratio is not an uncommon finding and recommend slight increased surveillance.        2) GERD- gave list of safe meds in pregnancy.  Patient will  PPI OTC.        3) Return next week for surveillance.           Attestation:   The patient was seen for an established outpatient visit.  I spent a total of 15 minutes face to face with Sobeida Fountain during today's office visit.  Over (>50%) was spent on counseling the patient and/or coordinating care regarding Di/Tri triplet.        Leni Beck, DO  Maternal-Fetal Medicine  January 27, 2020

## 2020-01-27 NOTE — NURSING NOTE
"Sobeida seen in clinic today for US/OB visit at 22w0d gestation for pregnancy complicated by di/tri triplets (see report/notes). VSS. Pt reports positive fetal movement x3, see flowsheet. Pt denies bldg/lof/change in discharge/contractions/vision changes/chest pain/SOB/edema. States she had one headache yesterday that \"went away on its own.\" Dr. Beck met with pt and discussed POC. Plan for added TTTS ultrasound next week, scheduled 2/5 per pt preference. Pt given directions and calendar with upcoming appointments, meds safe in pregnancy sheet, PCC card with triage line #. Pt discharged stable and ambulatory.      "

## 2020-01-28 ENCOUNTER — HOSPITAL ENCOUNTER (OUTPATIENT)
Dept: CARDIOLOGY | Facility: CLINIC | Age: 30
Discharge: HOME | End: 2020-01-28
Attending: OBSTETRICS & GYNECOLOGY | Admitting: OBSTETRICS & GYNECOLOGY
Payer: COMMERCIAL

## 2020-01-28 ENCOUNTER — HOSPITAL ENCOUNTER (OUTPATIENT)
Dept: CARDIOLOGY | Facility: CLINIC | Age: 30
End: 2020-01-28
Attending: OBSTETRICS & GYNECOLOGY
Payer: COMMERCIAL

## 2020-01-28 DIAGNOSIS — O30.111 TRIPLET GESTATION WITH TWO OR MORE MONOCHORIONIC FETUSES IN FIRST TRIMESTER: ICD-10-CM

## 2020-01-28 PROCEDURE — 76827 ECHO EXAM OF FETAL HEART: CPT

## 2020-01-28 PROCEDURE — 76825 ECHO EXAM OF FETAL HEART: CPT

## 2020-01-28 NOTE — PROGRESS NOTES
[facility]   Heart Center Fetal Consult Note    Patient:  Sobeida Fountain MRN:  [id number]   YOB: 1990 Age:  29 year old   Date of Visit:  2020 PCP:  No Ref-Primary, Physician     Dear Dr. Beck:    I had the pleasure of seeing Sobeida Fountain at the [facility] on 2020 in fetal cardiology consultation for triplets. She presented today accompanied by her partner. As you know, she is a 29 year old  at 22w1d who presented for fetal echocardiogram today because of triplets - dichorionic, triamniotic.     I performed and interpreted the fetal echocardiogram today, which demonstrated:   Fetus 1: Likely normal fetal echocardiogram; technically challenging due to fetal position. Normal right and left ventricular size and function. Normal right ventricular Tei index at 0.32. Normal left ventricular Tei index at 0.46. No hydrops. Fetal heart rate is regular at 158 bpm.   Fetus 2: Fetus 2. Normal fetal cardiac anatomy. Normal right and left ventricular size and function. Normal right ventricular Tei index at 0.45. Normal left ventricular Tei index at 0.49. No hydrops. Fetal heart rate is regular at 151 bpm.   Fetus 3: Fetus 3. Normal fetal cardiac anatomy. Normal fetal intracardiac connections. Normal right and left ventricular size and function. Normal right ventricular Tei index at 0.38. Normal left ventricular Tei index at 0.33. No hydrops. Fetal heart rate is regular at 156 bpm.     Plan:    1. No additional fetal echo needed at this time.   2. Transthoracic echocardiogram to be obtained after 24-48 hours of life to assess in particular fetus A due to difficult imaging due to fetal positioning.     Thank you for allowing me to participate in Jians care. Please do not hesitate to contact me with questions or concerns.    This visit was separate from the performance and interpretation of the ultrasound. The majority of the time (>50%)  was spent in counseling and coordination of care. I spent approximately 20 minutes in face-to-face time reviewing the above considerations.    Luis Ferrara M.D.  Pediatric Cardiology  [facility] [address], 5th floor, [facility] [address]  Fax [phone]

## 2020-02-05 ENCOUNTER — HOSPITAL ENCOUNTER (OUTPATIENT)
Dept: ULTRASOUND IMAGING | Facility: CLINIC | Age: 30
Discharge: HOME | End: 2020-02-05
Attending: OBSTETRICS & GYNECOLOGY | Admitting: OBSTETRICS & GYNECOLOGY
Payer: COMMERCIAL

## 2020-02-05 ENCOUNTER — OFFICE VISIT (OUTPATIENT)
Dept: MATERNAL FETAL MEDICINE | Facility: CLINIC | Age: 30
End: 2020-02-05
Attending: OBSTETRICS & GYNECOLOGY
Payer: COMMERCIAL

## 2020-02-05 DIAGNOSIS — O30.109: ICD-10-CM

## 2020-02-05 DIAGNOSIS — O30.122 TRIPLET GESTATION WITH TWO OR MORE MONOAMNIOTIC FETUSES IN SECOND TRIMESTER: Primary | ICD-10-CM

## 2020-02-05 PROCEDURE — 76820 UMBILICAL ARTERY ECHO: CPT | Mod: 59 | Performed by: OBSTETRICS & GYNECOLOGY

## 2020-02-05 PROCEDURE — 76816 OB US FOLLOW-UP PER FETUS: CPT

## 2020-02-07 NOTE — PROGRESS NOTES
"Please see \"Imaging\" tab under Chart Review for full details.    Pilar Aguirre MD  Maternal Fetal Medicine    "

## 2020-02-10 ENCOUNTER — OFFICE VISIT (OUTPATIENT)
Dept: MATERNAL FETAL MEDICINE | Facility: CLINIC | Age: 30
End: 2020-02-10
Attending: OBSTETRICS & GYNECOLOGY
Payer: COMMERCIAL

## 2020-02-10 ENCOUNTER — TELEPHONE (OUTPATIENT)
Dept: MATERNAL FETAL MEDICINE | Facility: CLINIC | Age: 30
End: 2020-02-10

## 2020-02-10 ENCOUNTER — HOSPITAL ENCOUNTER (OUTPATIENT)
Dept: ULTRASOUND IMAGING | Facility: CLINIC | Age: 30
Discharge: HOME | End: 2020-02-10
Attending: OBSTETRICS & GYNECOLOGY | Admitting: OBSTETRICS & GYNECOLOGY
Payer: COMMERCIAL

## 2020-02-10 VITALS
BODY MASS INDEX: 36.13 KG/M2 | DIASTOLIC BLOOD PRESSURE: 65 MMHG | OXYGEN SATURATION: 98 % | HEART RATE: 97 BPM | SYSTOLIC BLOOD PRESSURE: 115 MMHG | RESPIRATION RATE: 18 BRPM | WEIGHT: 230.7 LBS

## 2020-02-10 DIAGNOSIS — O30.111 TRIPLET GESTATION WITH TWO OR MORE MONOCHORIONIC FETUSES IN FIRST TRIMESTER: ICD-10-CM

## 2020-02-10 DIAGNOSIS — O30.112 TRIPLET GESTATION WITH TWO OR MORE MONOCHORIONIC FETUSES IN SECOND TRIMESTER: Primary | ICD-10-CM

## 2020-02-10 DIAGNOSIS — F50.89 PICA: ICD-10-CM

## 2020-02-10 LAB
ERYTHROCYTE [DISTWIDTH] IN BLOOD BY AUTOMATED COUNT: 12.3 % (ref 10–15)
FERRITIN SERPL-MCNC: 6 NG/ML (ref 12–150)
FOLATE SERPL-MCNC: 30.6 NG/ML
HCT VFR BLD AUTO: 36.3 % (ref 35–47)
HGB BLD-MCNC: 12.1 G/DL (ref 11.7–15.7)
MCH RBC QN AUTO: 29.4 PG (ref 26.5–33)
MCHC RBC AUTO-ENTMCNC: 33.3 G/DL (ref 31.5–36.5)
MCV RBC AUTO: 88 FL (ref 78–100)
PLATELET # BLD AUTO: 270 10E9/L (ref 150–450)
RBC # BLD AUTO: 4.11 10E12/L (ref 3.8–5.2)
VIT B12 SERPL-MCNC: 398 PG/ML (ref 193–986)
WBC # BLD AUTO: 9.8 10E9/L (ref 4–11)

## 2020-02-10 PROCEDURE — 83550 IRON BINDING TEST: CPT | Performed by: OBSTETRICS & GYNECOLOGY

## 2020-02-10 PROCEDURE — 82607 VITAMIN B-12: CPT | Performed by: OBSTETRICS & GYNECOLOGY

## 2020-02-10 PROCEDURE — 82728 ASSAY OF FERRITIN: CPT | Performed by: OBSTETRICS & GYNECOLOGY

## 2020-02-10 PROCEDURE — 36415 COLL VENOUS BLD VENIPUNCTURE: CPT | Performed by: OBSTETRICS & GYNECOLOGY

## 2020-02-10 PROCEDURE — 76820 UMBILICAL ARTERY ECHO: CPT | Performed by: OBSTETRICS & GYNECOLOGY

## 2020-02-10 PROCEDURE — 76816 OB US FOLLOW-UP PER FETUS: CPT

## 2020-02-10 PROCEDURE — G0463 HOSPITAL OUTPT CLINIC VISIT: HCPCS

## 2020-02-10 PROCEDURE — 82746 ASSAY OF FOLIC ACID SERUM: CPT | Performed by: OBSTETRICS & GYNECOLOGY

## 2020-02-10 PROCEDURE — 83540 ASSAY OF IRON: CPT | Performed by: OBSTETRICS & GYNECOLOGY

## 2020-02-10 PROCEDURE — 85027 COMPLETE CBC AUTOMATED: CPT | Performed by: OBSTETRICS & GYNECOLOGY

## 2020-02-10 ASSESSMENT — PATIENT HEALTH QUESTIONNAIRE - PHQ9: SUM OF ALL RESPONSES TO PHQ QUESTIONS 1-9: 20

## 2020-02-10 ASSESSMENT — PAIN SCALES - GENERAL: PAINLEVEL: NO PAIN (0)

## 2020-02-10 NOTE — PROGRESS NOTES
Maternal fetal Medicine OB Follow up visit.     Sobeida Fountain  : 1990  MRN: [id number]    CC: OB Follow-up    Subjective:  Sobeida Fountain is a 29 year old  at 23w6d presenting for routine OB follow-up.     Today, she reports depression symptoms ( difficulty sleeping and depresses mood). She also reports craving eating chalk and states this is almost uncontrollable. Had Pica with prior pregnancy.     She denies regular, painful contractions, denies loss of fluid or vaginal bleeding.  Reports fetal movement.      OB Hx:  OB History    Para Term  AB Living   3 1 1 0 1 1   SAB TAB Ectopic Multiple Live Births   1 0 0 0 1      # Outcome Date GA Lbr Terrence/2nd Weight Sex Delivery Anes PTL Lv   3 Current            2 Term 2014 39w0d       KALPANA   1 SAB              Objective:  /65 (BP Location: Left arm, Patient Position: Sitting, Cuff Size: Adult Regular)   Pulse 97   Resp 18   Wt 104.6 kg (230 lb 11.2 oz)   LMP 2019   SpO2 98%   BMI 36.13 kg/m      Gen: NAD  Resp: Non labored respirations    OB Ultrasound:  See imaging tab for results from today's ultrasound    Assessment/Plan:  29 year old  at 23w6d by 14w0d US, here for follow OB visit.    Pregnancy has been complicated by:   # Dichorionic Triamniotic Triplets (Fetuses 1 and 2 are monochorionic /diamnioticpair)   # Fetus 1 with fetal growth restriction (AC 3%ile), intermittently elevated UAR Dopplers, velamentous cord insertion.  - Patient previously counseled about maternal and pregnancy risks of triplet pregnancies.  - Genetic screening: NT measurement wnl for all 3 fetuses ( 19)   - Fetal anatomy completed, EIF on fetus 3, declined testing.   - Fetal echo on 2020, without evidence of CHD on 3 fetuses.   - TTTS/TAPS check for mono-di pair: Intermittent S/D elevation in fetus 1, but no signs of TTTS, likely related to now diagnosed FGR.   - Continue close monitoring with weekly UAR Doppler and  TTTS/TAPS check.     #Pica  -Incessant desire to eat chalk.   - Hb in 10/2019 was 13.8 g/dl  -Ordered CBC, Vit B12, Ferritin and Folate    # Anxiety/Depression  - Denies SI/HI, but is interested in interventions. Has never use antidepressant meds.   - History of  depression.   - Recommended referral to  psychology. Patient agreeable to this, if this does not help improving her symptoms she will benefit from starting antidepressants ( Discussed minimal risk during pregnancy) Agrees to start first with psychotherapy.     #Routine PNC:  - NOB Labs reviewed: Rh O positive, Ab screening negative, rubella Immune, Hep B SAg NR, HIV NR, RPR NR    GC/Chlam Neg/neg  -GCT early 19 failed ( 130) , 3 hr 19 ( passed)   -Pap smear: NILM 10/2019  -Immunizations: TDap @ 27 weeks, earlier if PTD is anticipated.   - GBS, consider earlier in the setting of likely delivery at 32-34 weeks.     #Delivery planning:  -  section  - Timing: Pending evolution of pregnancy  - Feeding: Not discussed yet  - Contraception plans: Not discussed yet, she may be interested in permanent sterilization    RTC in 1 weeks    Patient seen and discussed with Dr. Beck.       Josh Villegas MD  MFM fellow  2/10/2020  2:10 PM    Physician Attestation   I, Leni Beck DO, saw and evaluated Sobeida Fountain with the fellow.     I personally reviewed the vital signs, medications, labs and imaging.    My key history or physical exam findings:   New finding of lagging AC for Fetus 1.  Patient with symptoms of PICA and depression.  She is able to work, declines SI/HI.  Will arrange for counseling.  Open to meds if needed.  Had postpartum depression with prior pregnancy.     Key management decisions made by me:   Weekly testing due to FGR and intermittently abnl UA Dopplers for Fetus 1 (mo/di pair).  Labs for PICA.  Anticipate iron deficiency anemia, will start iron.     Leni Beck DO  Date  of Service (when I saw the patient): 02/10/20    Time Spent on this Encounter   I, Leni Beck DO, spent a total of 15 minutes face to face or coordinating care of Sobeida Fountain.  Over 50% of my time was spent counseling the patient and/or coordinating care regarding FGR of Fetus 1.

## 2020-02-10 NOTE — NURSING NOTE
Sobeida seen in clinic today for a RL2 and OB visit at 24w0d gestation for pregnancy complicated by di/tri triplets (see report/notes). VSS. Pt reports positve fetal movement x3. Pt denies bldg/lof/change in discharge/contractions/headache/vision changes/chest pain/SOB/edema. Pt reports that she has a very strong desire to eat chalk and she is having feelings of sadness that has increased over the past 1-2 months. Dr. Beck met with pt and discussed US results and POC. Plan for labs today and will begin weekly US to monitor TTTS and UAR/MCA's and growth q 3 weeks. Writer will coordinate a visit with mental health therapist, NICU/SW at upcoming visit along with repeat GCT at 26 weeks. Future visits scheduled at . Pt discharged stable and ambulatory.  Writer will call pt with lab results and plan for therapy.  Juana Kelly RN

## 2020-02-10 NOTE — TELEPHONE ENCOUNTER
Writer spoke with Sobeida to let her know we are still waiting for all labs to come back before we make recommendations. I also spoke with [facility] and they have an option to meet with a therapist next Monday morning at 0800. Sobeida declines that appointment. Writer also offered for pt to call [facility] for a needs assessment or have them call her, Sobeida declines as she does not want any other extra appointments outside her weekly [facility] appointments. If we can find someone to see her in coordination with scheduled visits she would like to do that, other wise pt states she will figure something else out. Writer confirmed that Sobeida has PCC # to call with any questions or concerns. Encouraged Sobeida to seek emergency help if she has any thoughts of harming herself or others. Sobeida declines feeling of harming herself or SI. Writer will seek out additional options and will contact pt with any information. Sobeida appreciative of information.   Juana Kelly RN

## 2020-02-11 ENCOUNTER — TELEPHONE (OUTPATIENT)
Dept: MATERNAL FETAL MEDICINE | Facility: CLINIC | Age: 30
End: 2020-02-11

## 2020-02-11 DIAGNOSIS — F50.89 PICA: Primary | ICD-10-CM

## 2020-02-11 LAB
IRON SATN MFR SERPL: 6 % (ref 15–46)
IRON SERPL-MCNC: 36 UG/DL (ref 35–180)
TIBC SERPL-MCNC: 641 UG/DL (ref 240–430)

## 2020-02-11 NOTE — TELEPHONE ENCOUNTER
Spoke with Sobeida and informed her of lab results from 2/10. Instructed pt to take 1 iron tab supplement per Dr. Beck recommendation. Pt states she continues to crave chalk and cigarette ashes but knows she can not eat them. Pt agreeable to take additional iron supplement.  Also offered pt appointment with [facility] on 3/2 in the [facility] before her US. Pt states she is unsure if she wants to see a therapist at this time but if her mood declines she will think about it. Writer encouraged Sobeida to keep appointment and we can cancel if she really does not want it. Pt agrees to plan. No further quesitons, reviewed appt date and time for Monday 2/17.  Juana Kelly RN

## 2020-02-17 ENCOUNTER — HOSPITAL ENCOUNTER (OUTPATIENT)
Dept: ULTRASOUND IMAGING | Facility: CLINIC | Age: 30
Discharge: HOME | End: 2020-02-17
Attending: OBSTETRICS & GYNECOLOGY | Admitting: OBSTETRICS & GYNECOLOGY
Payer: COMMERCIAL

## 2020-02-17 ENCOUNTER — OFFICE VISIT (OUTPATIENT)
Dept: MATERNAL FETAL MEDICINE | Facility: CLINIC | Age: 30
End: 2020-02-17
Attending: OBSTETRICS & GYNECOLOGY
Payer: COMMERCIAL

## 2020-02-17 DIAGNOSIS — O30.112 TRIPLET GESTATION WITH TWO OR MORE MONOCHORIONIC FETUSES IN SECOND TRIMESTER: Primary | ICD-10-CM

## 2020-02-17 DIAGNOSIS — O30.112 TRIPLET GESTATION WITH TWO OR MORE MONOCHORIONIC FETUSES IN SECOND TRIMESTER: ICD-10-CM

## 2020-02-17 DIAGNOSIS — O36.5921 INTRAUTERINE GROWTH RESTRICTION (IUGR) AFFECTING CARE OF MOTHER, SECOND TRIMESTER, FETUS 1: ICD-10-CM

## 2020-02-17 PROCEDURE — 76816 OB US FOLLOW-UP PER FETUS: CPT | Mod: 59

## 2020-02-17 PROCEDURE — 76820 UMBILICAL ARTERY ECHO: CPT | Mod: 59 | Performed by: OBSTETRICS & GYNECOLOGY

## 2020-02-17 PROCEDURE — 76815 OB US LIMITED FETUS(S): CPT

## 2020-02-17 NOTE — PROGRESS NOTES
"Please see \"Imaging\" tab under \"Chart Review\" for details of today's US at the [facility].    Chris Candelaria MD  Maternal-Fetal Medicine      "

## 2020-02-17 NOTE — NURSING NOTE
Met with Sobeida today, pt reports she is still craving chalk. Pt has increased her Iron intake as recommended, will follow up next week. Sobeida states she will do her 3 hour GCT next Tuesday or Wednesday. Sobeida also requests to cancel her [facility] counseling appointment, she states she is still feeling sad, but not having any thoughts of SI or harming herself, states she is going to try some self care to decrease her stress. Writer complimented her efforts and encouraged pt to call with any questions, concerns or increased depression symptoms. Pt VU.  Juana Kelly RN

## 2020-02-24 ENCOUNTER — HOSPITAL ENCOUNTER (OUTPATIENT)
Dept: ULTRASOUND IMAGING | Facility: CLINIC | Age: 30
Discharge: HOME | End: 2020-02-24
Attending: OBSTETRICS & GYNECOLOGY | Admitting: OBSTETRICS & GYNECOLOGY
Payer: COMMERCIAL

## 2020-02-24 ENCOUNTER — OFFICE VISIT (OUTPATIENT)
Dept: MATERNAL FETAL MEDICINE | Facility: CLINIC | Age: 30
End: 2020-02-24
Attending: OBSTETRICS & GYNECOLOGY
Payer: COMMERCIAL

## 2020-02-24 VITALS
DIASTOLIC BLOOD PRESSURE: 73 MMHG | OXYGEN SATURATION: 98 % | BODY MASS INDEX: 36.68 KG/M2 | SYSTOLIC BLOOD PRESSURE: 112 MMHG | HEART RATE: 105 BPM | RESPIRATION RATE: 18 BRPM | WEIGHT: 234.2 LBS

## 2020-02-24 DIAGNOSIS — O30.112 TRIPLET GESTATION WITH TWO OR MORE MONOCHORIONIC FETUSES IN SECOND TRIMESTER: ICD-10-CM

## 2020-02-24 DIAGNOSIS — O30.112 TRIPLET GESTATION WITH TWO OR MORE MONOCHORIONIC FETUSES IN SECOND TRIMESTER: Primary | ICD-10-CM

## 2020-02-24 PROCEDURE — 76820 UMBILICAL ARTERY ECHO: CPT | Performed by: OBSTETRICS & GYNECOLOGY

## 2020-02-24 PROCEDURE — 76816 OB US FOLLOW-UP PER FETUS: CPT | Mod: 59

## 2020-02-24 RX ORDER — FERROUS SULFATE 325(65) MG
325 TABLET ORAL
COMMUNITY
End: 2023-09-16

## 2020-02-24 ASSESSMENT — PAIN SCALES - GENERAL: PAINLEVEL: NO PAIN (0)

## 2020-02-24 NOTE — NURSING NOTE
Sobeida seen in clinic today for RL2 and OB visit at 26w0d gestation for pregnancy complicated by di/tri triplets (see report/notes). VSS. Pt reports positive fetal movement x3. Pt denies bldg/lof/change in discharge/contractions/headache/vision changes/chest pain/SOB/edema. Sobeida reports her cravings for chalk are not as intense and she feels her mood has improved. Sobeida has quit working and she feels like the weather has helped with her mood.  Dr. Castillo met with pt and discussed POC (see separate note). Plan for RL2 to evaluate Dopplers this week and will schedule weekly RL2/UAR/MCA along with weekly OB visits and NICU/SW at upcoming visit. Future visits scheduled at .  Sobeida will also do repeat GTT & 28 week labs on 2/26. Pt discharged stable and ambulatory.    Juana Kelly RN

## 2020-02-24 NOTE — PROGRESS NOTES
Maternal fetal Medicine OB Follow up visit.      Sobeida Fountain  : 1990  MRN: [id number]     CC: OB Follow-up     Subjective:  Sobeida Fountain is a 29 year old  at 26w0d presenting for routine OB follow-up.      Today, she reports that she is feeling well. She notes improvement in her pica and depressive symptoms with the warm weather. She denies any contractions, leakage of fluid, vaginal bleeding, or other systemic symptoms. Confirms active fetal movement. She denies any headache, changes in vision, chest pain, shortness of breath, or other systemic symptoms.      Objective:  LMP 2019       Gen: NAD  CV: Regular rate  Resp: Normal respiratory effort  Abd: Gravid, non-tender  Ext: No edema     OB Ultrasound:  See imaging tab for results from today's ultrasound     Assessment/Plan:  Sobeida Fountain is a 29 year old  female at 26w0d by 14w0d US, here for follow OB visit.     Pregnancy has been complicated by:   # Dichorionic Triamniotic Triplets (Fetus 1 and 2 are monochorionic/diamniotic pair)   # Fetus 1 with fetal growth restriction (AC 3%ile), intermittently elevated UAR Dopplers, velamentous cord insertion.  - Patient previously counseled about maternal and pregnancy risks of triplet pregnancies.  - Fetal anatomy completed, EIF on fetus 3, declined testing.   - Fetal echo on 2020, without evidence of CHD on 3 fetuses.   - TTTS/TAPS check for mono-di pair - Persistently S/D elevation in fetus 1, but no signs of TTTS, likely related to diagnosis of FGR.   - Increase monitoring to twice weekly UAR Doppler and TTTS/TAPS check.      # Pica  - Symptoms improving.  - Hgb 12.1, Ferritin 6, Vitamin B12/Folate WNL.     # Anxiety/Depression  - Denies SI/HI, but is interested in interventions. Has never used antidepressant meds.   - History of  depression.   - Declined  psychology.     # Routine PNC  - Prenatal labs: Rh O positive, Ab screening negative, rubella  "Immune, Hep B SAg NR, HIV NR, RPR NR, GC/Chlam Neg/neg, Pap smear: NILM 10/2019              Failed early GCT (130) 19. Passed 3 hr GTT on 19. Repeat .              GBS, consider earlier in the setting of likely delivery at 32-34 weeks.   - Immunizations: TDap @ 27 weeks, earlier if PTD is anticipated.   - Genetics: NT measurement wnl for all 3 fetuses (19). Declined further screening after EIF noted on Fetus 3.  - Prophylaxis: Aspirin 81 mg daily.     # Delivery planning  - Route:  section  - Timing: Pending evolution of pregnancy     RTC in 1 week     Patient seen and discussed with Dr. Teagan Castillo MD  Maternal Fetal Medicine Fellow  2020 12:09 PM      Please see \"Imaging\" tab under \"Chart Review\" for details of today's US at the [facility].    Physician Attestation   I, Chris Candelaria MD, saw this patient and agree with the findings and plan of care as documented in the note.      Items personally reviewed/procedural attestation: Ultrasound, VS, and plan of care/recommendations..    Chris Candelaria MD        "

## 2020-02-26 DIAGNOSIS — O30.112 TRIPLET GESTATION WITH TWO OR MORE MONOCHORIONIC FETUSES IN SECOND TRIMESTER: ICD-10-CM

## 2020-02-26 LAB
ABO + RH BLD: NORMAL
ABO + RH BLD: NORMAL
BLD GP AB SCN SERPL QL: NORMAL
BLOOD BANK CMNT PATIENT-IMP: NORMAL
ERYTHROCYTE [DISTWIDTH] IN BLOOD BY AUTOMATED COUNT: 13.1 % (ref 10–15)
GLUCOSE 1H P 100 G GLC PO SERPL-MCNC: 116 MG/DL (ref 60–179)
GLUCOSE 2H P 100 G GLC PO SERPL-MCNC: 130 MG/DL (ref 60–154)
GLUCOSE 3H P 100 G GLC PO SERPL-MCNC: 126 MG/DL (ref 60–139)
GLUCOSE BLDC GLUCOMTR-MCNC: 78 MG/DL (ref 70–99)
GLUCOSE P FAST SERPL-MCNC: 75 MG/DL (ref 60–94)
HCT VFR BLD AUTO: 34.4 % (ref 35–47)
HGB BLD-MCNC: 11.2 G/DL (ref 11.7–15.7)
MCH RBC QN AUTO: 28.9 PG (ref 26.5–33)
MCHC RBC AUTO-ENTMCNC: 32.6 G/DL (ref 31.5–36.5)
MCV RBC AUTO: 89 FL (ref 78–100)
PLATELET # BLD AUTO: 247 10E9/L (ref 150–450)
RBC # BLD AUTO: 3.88 10E12/L (ref 3.8–5.2)
SPECIMEN EXP DATE BLD: NORMAL
T PALLIDUM AB SER QL: NONREACTIVE
WBC # BLD AUTO: 9.1 10E9/L (ref 4–11)

## 2020-02-26 PROCEDURE — 36415 COLL VENOUS BLD VENIPUNCTURE: CPT | Performed by: OBSTETRICS & GYNECOLOGY

## 2020-02-26 PROCEDURE — 86850 RBC ANTIBODY SCREEN: CPT | Performed by: OBSTETRICS & GYNECOLOGY

## 2020-02-26 PROCEDURE — 82951 GLUCOSE TOLERANCE TEST (GTT): CPT | Performed by: OBSTETRICS & GYNECOLOGY

## 2020-02-26 PROCEDURE — 82962 GLUCOSE BLOOD TEST: CPT

## 2020-02-26 PROCEDURE — 86900 BLOOD TYPING SEROLOGIC ABO: CPT | Performed by: OBSTETRICS & GYNECOLOGY

## 2020-02-26 PROCEDURE — 86780 TREPONEMA PALLIDUM: CPT | Performed by: OBSTETRICS & GYNECOLOGY

## 2020-02-26 PROCEDURE — 86901 BLOOD TYPING SEROLOGIC RH(D): CPT | Performed by: OBSTETRICS & GYNECOLOGY

## 2020-02-26 PROCEDURE — 85027 COMPLETE CBC AUTOMATED: CPT | Performed by: OBSTETRICS & GYNECOLOGY

## 2020-02-26 PROCEDURE — 82952 GTT-ADDED SAMPLES: CPT | Performed by: OBSTETRICS & GYNECOLOGY

## 2020-02-27 ENCOUNTER — HOSPITAL ENCOUNTER (INPATIENT)
Facility: CLINIC | Age: 30
Setting detail: SURGERY ADMIT
End: 2020-02-27
Attending: OBSTETRICS & GYNECOLOGY | Admitting: OBSTETRICS & GYNECOLOGY
Payer: COMMERCIAL

## 2020-02-27 ENCOUNTER — HOSPITAL ENCOUNTER (OUTPATIENT)
Facility: CLINIC | Age: 30
Discharge: HOME | End: 2020-02-27
Attending: OBSTETRICS & GYNECOLOGY | Admitting: OBSTETRICS & GYNECOLOGY
Payer: COMMERCIAL

## 2020-02-27 ENCOUNTER — TELEPHONE (OUTPATIENT)
Dept: MATERNAL FETAL MEDICINE | Facility: CLINIC | Age: 30
End: 2020-02-27

## 2020-02-27 VITALS — DIASTOLIC BLOOD PRESSURE: 72 MMHG | SYSTOLIC BLOOD PRESSURE: 123 MMHG | RESPIRATION RATE: 18 BRPM | TEMPERATURE: 98.8 F

## 2020-02-27 DIAGNOSIS — O30.123: ICD-10-CM

## 2020-02-27 LAB
ALBUMIN UR-MCNC: 30 MG/DL
APPEARANCE UR: ABNORMAL
BACTERIA #/AREA URNS HPF: ABNORMAL /HPF
BILIRUB UR QL STRIP: NEGATIVE
CAOX CRY #/AREA URNS HPF: ABNORMAL /HPF
COLOR UR AUTO: YELLOW
GLUCOSE UR STRIP-MCNC: NEGATIVE MG/DL
HGB UR QL STRIP: NEGATIVE
KETONES UR STRIP-MCNC: 10 MG/DL
LEUKOCYTE ESTERASE UR QL STRIP: ABNORMAL
MUCOUS THREADS #/AREA URNS LPF: PRESENT /LPF
NITRATE UR QL: NEGATIVE
PH UR STRIP: 6 PH (ref 5–7)
RBC #/AREA URNS AUTO: 2 /HPF (ref 0–2)
SOURCE: ABNORMAL
SP GR UR STRIP: 1.02 (ref 1–1.03)
SQUAMOUS #/AREA URNS AUTO: 5 /HPF (ref 0–1)
UROBILINOGEN UR STRIP-MCNC: 2 MG/DL (ref 0–2)
WBC #/AREA URNS AUTO: 4 /HPF (ref 0–5)

## 2020-02-27 PROCEDURE — 87086 URINE CULTURE/COLONY COUNT: CPT | Performed by: OBSTETRICS & GYNECOLOGY

## 2020-02-27 PROCEDURE — G0463 HOSPITAL OUTPT CLINIC VISIT: HCPCS

## 2020-02-27 PROCEDURE — 81001 URINALYSIS AUTO W/SCOPE: CPT | Performed by: OBSTETRICS & GYNECOLOGY

## 2020-02-27 RX ORDER — NICOTINE POLACRILEX 4 MG/1
20 GUM, CHEWING ORAL DAILY
COMMUNITY
End: 2023-09-16

## 2020-02-27 NOTE — PROGRESS NOTES
[facility]  OB Triage Note    CC: Vaginal pain    HPI: Ms. Sobeida Fountain is a 29 year old  at 26w3d by 14w0d US, who presents with vaginal pain. Sobeida reports that she has had some mild vaginal pain since approximately the 12th week of her pregnancy but that it worsened significantly today. It came on suddenly this morning. Her pain is localized to only the exterior of her vagina and is made worse by walking and laying on her side. With activity she reports her pain to be 8/10 but is currently 0 while laying flat. She has not recently had intercourse and denies recent pelvic injury. She is not currently working not has she recently increased her activity level. Aside from her vaginal pain she reports feeling otherwise well. Denies any urinary symptoms, fever, chills, nausea or vomiting. No chest pain, SOB, headache, or vision changes. She denies contractions. No leaking fluid, vaginal bleeding. Normal, regular fetal movement.    Obstetric Complications  1. Dichorionic triamniotic triplets  2. Fetal growth restriction, intermittently elevated UAR Dopplers, velamentous cord insertion in fetus 1  3. HSV-2  4. Pica    O:  Patient Vitals for the past 24 hrs:   BP Temp Temp src Resp   20 1426 123/72 98.8  F (37.1  C) Oral 18     Gen: Well-appearing, NAD  CV: RRR, no murmurs  Pulm: CTAB, no wheezing  Abd: Soft, gravid, non tender  Ext: No LE edema b/l  Urogenital exam: Normal appearing female external genitalia and urethra. No external rash, excoriations, lacerations, erythema, or vesicles. While she was on her back, both labia appeared similar in size, mildly edematous, however, when placing on her on her side, the lowermost labia appeared more edematous. Non tender.       FHT: Reassuring for gestational age for each fetus.  [place]: No ctx in 10 mins    Labs:    Urinalysis   Lab Test 20  1400   COLOR Yellow   APPEARANCE Slightly Cloudy   URINEGLC Negative   URINEBILI  Negative   URINEKETONE 10*   SG 1.024   UBLD Negative   URINEPH 6.0   PROTEIN 30*   NITRITE Negative   LEUKEST Moderate*   RBCU 2   WBCU 4     A/P:  Ms. Sobeida Fountain is a 29 year old  at 26w3d by 14w0d US here with vaginal pain.    # Vaginal Pain  No evidence of infection, hematoma, lymphadenopathy, cystitis, or trauma. Some vulvar edema is noted on exam and likely explains her pain. Some amount of perineal edema would be expected at this gestational age, especially in the setting of a triplet pregnancy.   - UA without sign of infection. Will follow up Ucx  - Offered patient abdominal binder     # HSV-2  On daily prophylaxis and currently asymptomatic. Denies any new lesions; no vesicles noted on exam.  - Continue acyclovir 400mg BID    # Dichorionic Triamniotic Triplets (Fetus 1 and 2 are monochorionic/diamniotic pair)   # Fetus 1 with fetal growth restriction (AC 3%ile), intermittently elevated UAR Dopplers, velamentous cord insertion.  Monitoring is reassuring today with category 1 tracings x3. No evidence of  contractions or labor.   - No evidence of TTTS/TAPS on  ultrasound  - Patient has intensive outpatient follow-up with [facility] clinic, next appointment scheduled for tomorrow.    # Pica  Reports craving for chalk which she has not eaten. Since starting iron supplementation her cravings have lessened.  - Continue ferrous sulfate 325mg QD    # Anxiety and Depression  History of  depression. Reports stable mood today.     # Prenatal Care  - Rh positive  - Failed early GCT, passed GTT x2  - EIF noted on fetus 3, declined additional testing  - s/p fetal echocardiogram  with normal anatomy    Dispo: NST and exam are reassuring for fetal and maternal health, safe for discharge to home.     Seen and staffed with Dr. Aguirre.      John Wilson, MS4  2020, 2:04 PM

## 2020-02-27 NOTE — PLAN OF CARE
Data: [facility] at [facility].   Reason for maternal/fetal assessment per patient is No chief complaint on file.  . Patient is a . Prenatal record reviewed.      OB History    Para Term  AB Living   3 1 1 0 1 1   SAB TAB Ectopic Multiple Live Births   1 0 0 0 1      # Outcome Date GA Lbr Terrence/2nd Weight Sex Delivery Anes PTL Lv   3 Current            2 Term  39w0d       KALPANA   1 SAB               Medical History:   Past Medical History:   Diagnosis Date     Depressive disorder     both during and after   . Gestational Age 26w3d. VSS. Cervix: not examined.  Fetal movement present. Patient denies cramping, backache, vaginal discharge, pelvic pressure, UTI symptoms, GI problems, bloody show, vaginal bleeding, edema, headache, visual disturbances, epigastric or URQ pain, abdominal pain, rupture of membranes. Support persons Mathew present.  Action: Verbal consent for EFM. Triage assessment completed. EFM applied for  labor assesment. Uterine assessment no contractions. Fetal assessment: Presumed adequate fetal oxygenation documented (see flow record). Patient education pamphlets given on fetal movement counts and signs of preeclampsia and labor. Patient instructed to report change in fetal movement, vaginal leaking of fluid or bleeding, abdominal pain, or any concerns related to the pregnancy to her nurse/physician.   Response: Dr. Aguirre and Saw  informed of arrival and have evaluated patient. Plan per provider is discharge home. Patient verbalized understanding of education and verbalized agreement with plan. Discharged ambulatory at 1540.

## 2020-02-27 NOTE — TELEPHONE ENCOUNTER
Sobeida called reporting that she woke up this morning with vaginal pain that has worsened throughout the day, making it difficult to walk. Pt denies  LOF/Bleeding/change in discharge/cramping or ctx. States babies are moving. Writer notified G1 on call in L&D who recommends pt come to L&D to be evaluated. Pt agreeable with plan, instructions for parking and where to check in reviewed. L&D charge nurse notified.  Juana Kelly RN

## 2020-02-27 NOTE — DISCHARGE INSTRUCTIONS
Discharge Instruction for Undelivered Patients      You were seen for: vaginal pain   You were seen by Dr. Aguirre and .  Juan José had (Test or Medicine):baby monitoring x3, urine sent to lab.     Diet:   Drink 8 to 12 glasses of liquids (milk, juice, water) every day.  You may eat meals and snacks.     Activity:  Count fetal kicks everyday (see handout)  Call your doctor or nurse midwife if your baby is moving less than usual.     Call your provider if you notice:  Swelling in your face or increased swelling in your hands or legs.  Headaches that are not relieved by Tylenol (acetaminophen).  Changes in your vision (blurring: seeing spots or stars.)  Nausea (sick to your stomach) and vomiting (throwing up).   Weight gain of 5 pounds or more per week.  Heartburn that doesn't go away.  Signs of bladder infection: pain when you urinate (use the toilet), need to go more often and more urgently.  The bag of lala (rupture of membranes) breaks, or you notice leaking in your underwear.  Bright red blood in your underwear.  Abdominal (lower belly) or stomach pain.  Second (plus) baby: Contractions (tightening) less than 10 minutes apart and getting stronger.  *If less than 34 weeks: Contractions (tightenings) more than 6 times in one hour.  Increase or change in vaginal discharge (note the color and amount)  Abdominal band for support.    Follow-up:  As scheduled in the clinic at [facility].

## 2020-02-28 ENCOUNTER — RECORDS - HEALTHEAST (OUTPATIENT)
Dept: ULTRASOUND IMAGING | Facility: HOSPITAL | Age: 30
End: 2020-02-28

## 2020-02-28 ENCOUNTER — OFFICE VISIT - HEALTHEAST (OUTPATIENT)
Dept: MATERNAL FETAL MEDICINE | Facility: HOSPITAL | Age: 30
End: 2020-02-28

## 2020-02-28 ENCOUNTER — RECORDS - HEALTHEAST (OUTPATIENT)
Dept: ADMINISTRATIVE | Facility: OTHER | Age: 30
End: 2020-02-28

## 2020-02-28 DIAGNOSIS — O30.109: ICD-10-CM

## 2020-02-28 DIAGNOSIS — O26.90 PREGNANCY RELATED CONDITIONS, UNSPECIFIED, UNSPECIFIED TRIMESTER: ICD-10-CM

## 2020-02-28 LAB
BACTERIA SPEC CULT: NORMAL
Lab: NORMAL
SPECIMEN SOURCE: NORMAL

## 2020-03-02 ENCOUNTER — TELEPHONE (OUTPATIENT)
Dept: MATERNAL FETAL MEDICINE | Facility: CLINIC | Age: 30
End: 2020-03-02

## 2020-03-02 ENCOUNTER — OFFICE VISIT (OUTPATIENT)
Dept: MATERNAL FETAL MEDICINE | Facility: CLINIC | Age: 30
End: 2020-03-02
Attending: OBSTETRICS & GYNECOLOGY
Payer: COMMERCIAL

## 2020-03-02 ENCOUNTER — HOSPITAL ENCOUNTER (OUTPATIENT)
Dept: ULTRASOUND IMAGING | Facility: CLINIC | Age: 30
Discharge: HOME | End: 2020-03-02
Attending: OBSTETRICS & GYNECOLOGY | Admitting: OBSTETRICS & GYNECOLOGY
Payer: COMMERCIAL

## 2020-03-02 VITALS
SYSTOLIC BLOOD PRESSURE: 116 MMHG | OXYGEN SATURATION: 100 % | DIASTOLIC BLOOD PRESSURE: 72 MMHG | WEIGHT: 236 LBS | HEART RATE: 99 BPM | BODY MASS INDEX: 36.96 KG/M2 | RESPIRATION RATE: 18 BRPM

## 2020-03-02 DIAGNOSIS — O36.5921 INTRAUTERINE GROWTH RESTRICTION (IUGR) AFFECTING CARE OF MOTHER, SECOND TRIMESTER, FETUS 1: ICD-10-CM

## 2020-03-02 DIAGNOSIS — O30.112 TRIPLET GESTATION WITH TWO OR MORE MONOCHORIONIC FETUSES IN SECOND TRIMESTER: ICD-10-CM

## 2020-03-02 DIAGNOSIS — O30.112 TRIPLET GESTATION WITH TWO OR MORE MONOCHORIONIC FETUSES IN SECOND TRIMESTER: Primary | ICD-10-CM

## 2020-03-02 PROCEDURE — 76820 UMBILICAL ARTERY ECHO: CPT | Mod: 59 | Performed by: OBSTETRICS & GYNECOLOGY

## 2020-03-02 PROCEDURE — G0463 HOSPITAL OUTPT CLINIC VISIT: HCPCS | Mod: 25,ZF

## 2020-03-02 PROCEDURE — 76816 OB US FOLLOW-UP PER FETUS: CPT

## 2020-03-02 ASSESSMENT — PAIN SCALES - GENERAL: PAINLEVEL: NO PAIN (0)

## 2020-03-02 ASSESSMENT — PATIENT HEALTH QUESTIONNAIRE - PHQ9: SUM OF ALL RESPONSES TO PHQ QUESTIONS 1-9: 2

## 2020-03-02 NOTE — PROGRESS NOTES
Maternal fetal Medicine OB Follow up visit.      Sobeida Fountain  : 1990  MRN: [id number]     CC: OB Follow-up     Subjective:  Generally feeling well. She denies any contractions, leakage of fluid, vaginal bleeding, or other systemic symptoms. Confirms active fetal movement. She denies any headache, changes in vision, chest pain, shortness of breath, or other systemic symptoms.      Objective:  /72 (BP Location: Left arm, Patient Position: Sitting, Cuff Size: Adult Large)   Pulse 99   Resp 18   Wt 107 kg (236 lb)   LMP 2019   SpO2 100%   BMI 36.96 kg/m      Gen: NAD  CV: Regular rate  Resp: Normal respiratory effort  Abd: Gravid, non-tender  Ext: No edema     OB Ultrasound:  See imaging tab for results from today's ultrasound     Assessment/Plan:  Sobeida Fountain is a 29 year old  female at 27w0d  by 14w0d US, here for follow OB visit.     Pregnancy has been complicated by:   # Dichorionic Triamniotic Triplets (Fetus 1 and 2 are monochorionic/diamniotic pair)   # Fetus 1 with fetal growth restriction (AC 3.5%ile), intermittently elevated UAR Dopplers, velamentous cord insertion.  - Patient previously counseled about maternal and pregnancy risks of triplet pregnancies.  - Fetal anatomy completed, EIF on fetus 3, declined testing.   - Fetal echo on 2020, without evidence of CHD on 3 fetuses.  ECHO to be performed within 24-48 hr.  - TTTS/TAPS check for mono-di pair -no sonographic signs today.  - Increase monitoring to twice weekly UAR Doppler and TTTS/TAPS check.      # Pica  - Symptoms improving.  - Hgb 12.1, Ferritin 6, Vitamin B12/Folate WNL.     # Anxiety/Depression  - PHQ-9 on 2/10 score was 20. Declined interventions then. Today's core is 3.  - History of  depression.   - Declined  psychology.     # Routine PNC  - Prenatal labs: Rh O positive, Ab screening negative, rubella Immune, Hep B SAg NR, HIV NR, RPR NR, GC/Chlam Neg/neg, Pap smear:  "NILM 10/2019                          Failed early GCT (130) 12/18/19. Passed 3 hr GTT on 12/23/19, and on 2/26/2020                          GBS, consider earlier in the setting of likely delivery at 32-34 weeks.   - Immunizations: TDap next week.  - Genetics: NT measurement wnl for all 3 fetuses (11/20/19). Declined further screening after EIF noted on Fetus 3.  - Prophylaxis: Aspirin 81 mg daily.     # Delivery planning  - CD at 34 weeks . To be scheduled     RTC in 1 week     Patient seen and discussed with Dr. Teagan Spring MD  Maternal-Fetal Medicine fellow  March 2, 2020     Please see \"Imaging\" tab under \"Chart Review\" for details of today's US at the [facility].    Physician Attestation   I, Chris Candelaria MD, saw this patient and agree with the findings and plan of care as documented in the note.      Items personally reviewed/procedural attestation: History, ultrasound, and plan of care.    Chris Candelaria MD             "

## 2020-03-02 NOTE — TELEPHONE ENCOUNTER
Sobeida called back, informed her of del plan around 34 weeks. Will continue to discuss at the next visits. Pt verbalized understanding and agree with plan.  Juana Kelly RN

## 2020-03-02 NOTE — TELEPHONE ENCOUNTER
REBEL Vidales that we are planning 34 week (4/20/20) c/s at this time, writer will schedule and call pt back with details.   Juana Kelly RN

## 2020-03-02 NOTE — NURSING NOTE
Sobeida seen in clinic today for RL2/UAR/MCA (growth) OB visit at 27w0d gestation for pregnancy complicated by di/tri triplets (see report/notes). VSS. Pt reports positive fetal movement. Pt denies bldg/lof/change in discharge/contractions/headache/vision changes/chest pain/SOB/edema. Pt reports her mood has significantly improved with the change in weather and is feeling much better, see PHQ-9. Pt also continues to crave chalk but it is tolerable, she knows not to eat it.  Dr. Spring and Teagan met with pt and discussed POC. Plan for weekly US's and ob visits. Future visits previously scheduled. Plan for del will be discussed ant care call today and writer will notify pt. Will do TDAP next week. Pt discharged stable and ambulatory.

## 2020-03-06 ENCOUNTER — HOSPITAL ENCOUNTER (INPATIENT)
Facility: CLINIC | Age: 30
LOS: 3 days | Discharge: HOME | End: 2020-03-09
Attending: OBSTETRICS & GYNECOLOGY | Admitting: OBSTETRICS & GYNECOLOGY
Payer: COMMERCIAL

## 2020-03-06 ENCOUNTER — TELEPHONE (OUTPATIENT)
Dept: MATERNAL FETAL MEDICINE | Facility: CLINIC | Age: 30
End: 2020-03-06

## 2020-03-06 DIAGNOSIS — B96.89 BACTERIAL VAGINOSIS IN PREGNANCY: Primary | ICD-10-CM

## 2020-03-06 DIAGNOSIS — O23.599 BACTERIAL VAGINOSIS IN PREGNANCY: Primary | ICD-10-CM

## 2020-03-06 PROBLEM — Z36.89 ENCOUNTER FOR TRIAGE IN PREGNANT PATIENT: Status: ACTIVE | Noted: 2020-03-06

## 2020-03-06 PROBLEM — O60.00 PRETERM LABOR: Status: ACTIVE | Noted: 2020-03-06

## 2020-03-06 LAB
ABO + RH BLD: NORMAL
ABO + RH BLD: NORMAL
ALBUMIN UR-MCNC: NEGATIVE MG/DL
AMPHETAMINES UR QL SCN: NEGATIVE
APPEARANCE UR: CLEAR
BACTERIA #/AREA URNS HPF: ABNORMAL /HPF
BASOPHILS # BLD AUTO: 0 10E9/L (ref 0–0.2)
BASOPHILS NFR BLD AUTO: 0.1 %
BILIRUB UR QL STRIP: NEGATIVE
BLD GP AB SCN SERPL QL: NORMAL
BLOOD BANK CMNT PATIENT-IMP: NORMAL
CANNABINOIDS UR QL: NEGATIVE
COCAINE UR QL: NEGATIVE
COLOR UR AUTO: YELLOW
DIFFERENTIAL METHOD BLD: ABNORMAL
EOSINOPHIL # BLD AUTO: 0.1 10E9/L (ref 0–0.7)
EOSINOPHIL NFR BLD AUTO: 0.6 %
ERYTHROCYTE [DISTWIDTH] IN BLOOD BY AUTOMATED COUNT: 13.3 % (ref 10–15)
FIBRONECTIN FETAL VAG QL: NEGATIVE
GLUCOSE UR STRIP-MCNC: NEGATIVE MG/DL
HCT VFR BLD AUTO: 33.8 % (ref 35–47)
HGB BLD-MCNC: 11.3 G/DL (ref 11.7–15.7)
HGB UR QL STRIP: NEGATIVE
IMM GRANULOCYTES # BLD: 0.1 10E9/L (ref 0–0.4)
IMM GRANULOCYTES NFR BLD: 1.2 %
KETONES UR STRIP-MCNC: NEGATIVE MG/DL
LEUKOCYTE ESTERASE UR QL STRIP: ABNORMAL
LYMPHOCYTES # BLD AUTO: 1.8 10E9/L (ref 0.8–5.3)
LYMPHOCYTES NFR BLD AUTO: 21 %
MCH RBC QN AUTO: 29.2 PG (ref 26.5–33)
MCHC RBC AUTO-ENTMCNC: 33.4 G/DL (ref 31.5–36.5)
MCV RBC AUTO: 87 FL (ref 78–100)
MONOCYTES # BLD AUTO: 0.3 10E9/L (ref 0–1.3)
MONOCYTES NFR BLD AUTO: 3.9 %
MUCOUS THREADS #/AREA URNS LPF: PRESENT /LPF
NEUTROPHILS # BLD AUTO: 6.2 10E9/L (ref 1.6–8.3)
NEUTROPHILS NFR BLD AUTO: 73.2 %
NITRATE UR QL: NEGATIVE
NRBC # BLD AUTO: 0 10*3/UL
NRBC BLD AUTO-RTO: 0 /100
OPIATES UR QL SCN: NEGATIVE
PCP UR QL SCN: NEGATIVE
PH UR STRIP: 7 PH (ref 5–7)
PLATELET # BLD AUTO: 225 10E9/L (ref 150–450)
RBC # BLD AUTO: 3.87 10E12/L (ref 3.8–5.2)
RBC #/AREA URNS AUTO: <1 /HPF (ref 0–2)
SOURCE: ABNORMAL
SP GR UR STRIP: 1.01 (ref 1–1.03)
SPECIMEN EXP DATE BLD: NORMAL
SPECIMEN SOURCE: ABNORMAL
SQUAMOUS #/AREA URNS AUTO: 3 /HPF (ref 0–1)
UROBILINOGEN UR STRIP-MCNC: NORMAL MG/DL (ref 0–2)
WBC # BLD AUTO: 8.5 10E9/L (ref 4–11)
WBC #/AREA URNS AUTO: 2 /HPF (ref 0–5)
WET PREP SPEC: ABNORMAL

## 2020-03-06 PROCEDURE — 25000132 ZZH RX MED GY IP 250 OP 250 PS 637: Performed by: STUDENT IN AN ORGANIZED HEALTH CARE EDUCATION/TRAINING PROGRAM

## 2020-03-06 PROCEDURE — 12000001 ZZH R&B MED SURG/OB UMMC

## 2020-03-06 PROCEDURE — 81001 URINALYSIS AUTO W/SCOPE: CPT | Performed by: OBSTETRICS & GYNECOLOGY

## 2020-03-06 PROCEDURE — 87491 CHLMYD TRACH DNA AMP PROBE: CPT | Performed by: OBSTETRICS & GYNECOLOGY

## 2020-03-06 PROCEDURE — 25800030 ZZH RX IP 258 OP 636: Performed by: OBSTETRICS & GYNECOLOGY

## 2020-03-06 PROCEDURE — G0463 HOSPITAL OUTPT CLINIC VISIT: HCPCS

## 2020-03-06 PROCEDURE — 25800030 ZZH RX IP 258 OP 636

## 2020-03-06 PROCEDURE — 87653 STREP B DNA AMP PROBE: CPT | Performed by: OBSTETRICS & GYNECOLOGY

## 2020-03-06 PROCEDURE — 86901 BLOOD TYPING SEROLOGIC RH(D): CPT | Performed by: OBSTETRICS & GYNECOLOGY

## 2020-03-06 PROCEDURE — 87210 SMEAR WET MOUNT SALINE/INK: CPT | Performed by: OBSTETRICS & GYNECOLOGY

## 2020-03-06 PROCEDURE — 87081 CULTURE SCREEN ONLY: CPT | Performed by: OBSTETRICS & GYNECOLOGY

## 2020-03-06 PROCEDURE — 25000128 H RX IP 250 OP 636: Performed by: STUDENT IN AN ORGANIZED HEALTH CARE EDUCATION/TRAINING PROGRAM

## 2020-03-06 PROCEDURE — 82731 ASSAY OF FETAL FIBRONECTIN: CPT | Performed by: OBSTETRICS & GYNECOLOGY

## 2020-03-06 PROCEDURE — 25000128 H RX IP 250 OP 636

## 2020-03-06 PROCEDURE — 86850 RBC ANTIBODY SCREEN: CPT | Performed by: OBSTETRICS & GYNECOLOGY

## 2020-03-06 PROCEDURE — 87591 N.GONORRHOEAE DNA AMP PROB: CPT | Performed by: OBSTETRICS & GYNECOLOGY

## 2020-03-06 PROCEDURE — 85025 COMPLETE CBC W/AUTO DIFF WBC: CPT | Performed by: OBSTETRICS & GYNECOLOGY

## 2020-03-06 PROCEDURE — 80307 DRUG TEST PRSMV CHEM ANLYZR: CPT | Performed by: OBSTETRICS & GYNECOLOGY

## 2020-03-06 PROCEDURE — 86900 BLOOD TYPING SEROLOGIC ABO: CPT | Performed by: OBSTETRICS & GYNECOLOGY

## 2020-03-06 RX ORDER — SODIUM CHLORIDE, SODIUM LACTATE, POTASSIUM CHLORIDE, CALCIUM CHLORIDE 600; 310; 30; 20 MG/100ML; MG/100ML; MG/100ML; MG/100ML
INJECTION, SOLUTION INTRAVENOUS CONTINUOUS
Status: DISCONTINUED | OUTPATIENT
Start: 2020-03-06 | End: 2020-03-09 | Stop reason: HOSPADM

## 2020-03-06 RX ORDER — CALCIUM GLUCONATE 94 MG/ML
1 INJECTION, SOLUTION INTRAVENOUS
Status: DISCONTINUED | OUTPATIENT
Start: 2020-03-06 | End: 2020-03-09 | Stop reason: HOSPADM

## 2020-03-06 RX ORDER — PRENATAL VIT/IRON FUM/FOLIC AC 27MG-0.8MG
TABLET ORAL DAILY
Status: DISCONTINUED | OUTPATIENT
Start: 2020-03-07 | End: 2020-03-09 | Stop reason: HOSPADM

## 2020-03-06 RX ORDER — BETAMETHASONE SODIUM PHOSPHATE AND BETAMETHASONE ACETATE 3; 3 MG/ML; MG/ML
12 INJECTION, SUSPENSION INTRA-ARTICULAR; INTRALESIONAL; INTRAMUSCULAR; SOFT TISSUE EVERY 24 HOURS
Status: COMPLETED | OUTPATIENT
Start: 2020-03-06 | End: 2020-03-07

## 2020-03-06 RX ORDER — ACYCLOVIR 400 MG/1
400 TABLET ORAL 2 TIMES DAILY
Status: DISCONTINUED | OUTPATIENT
Start: 2020-03-06 | End: 2020-03-09 | Stop reason: HOSPADM

## 2020-03-06 RX ORDER — LIDOCAINE 40 MG/G
CREAM TOPICAL
Status: DISCONTINUED | OUTPATIENT
Start: 2020-03-06 | End: 2020-03-09 | Stop reason: HOSPADM

## 2020-03-06 RX ORDER — MAGNESIUM SULFATE IN WATER 40 MG/ML
2 INJECTION, SOLUTION INTRAVENOUS CONTINUOUS
Status: DISCONTINUED | OUTPATIENT
Start: 2020-03-06 | End: 2020-03-07

## 2020-03-06 RX ORDER — METRONIDAZOLE 500 MG/1
500 TABLET ORAL 2 TIMES DAILY
Status: DISCONTINUED | OUTPATIENT
Start: 2020-03-06 | End: 2020-03-09 | Stop reason: HOSPADM

## 2020-03-06 RX ORDER — INDOMETHACIN 25 MG/1
50 CAPSULE ORAL ONCE
Status: COMPLETED | OUTPATIENT
Start: 2020-03-06 | End: 2020-03-06

## 2020-03-06 RX ORDER — CALCIUM CARBONATE 500 MG/1
500 TABLET, CHEWABLE ORAL 2 TIMES DAILY
Status: DISCONTINUED | OUTPATIENT
Start: 2020-03-06 | End: 2020-03-09 | Stop reason: HOSPADM

## 2020-03-06 RX ORDER — INDOMETHACIN 25 MG/1
25 CAPSULE ORAL EVERY 6 HOURS
Status: COMPLETED | OUTPATIENT
Start: 2020-03-06 | End: 2020-03-08

## 2020-03-06 RX ADMIN — BETAMETHASONE SODIUM PHOSPHATE AND BETAMETHASONE ACETATE 12 MG: 3; 3 INJECTION, SUSPENSION INTRA-ARTICULAR; INTRALESIONAL; INTRAMUSCULAR at 12:26

## 2020-03-06 RX ADMIN — SODIUM CHLORIDE, POTASSIUM CHLORIDE, SODIUM LACTATE AND CALCIUM CHLORIDE: 600; 310; 30; 20 INJECTION, SOLUTION INTRAVENOUS at 17:12

## 2020-03-06 RX ADMIN — MAGNESIUM SULFATE HEPTAHYDRATE 6 G: 500 INJECTION, SOLUTION INTRAMUSCULAR; INTRAVENOUS at 12:40

## 2020-03-06 RX ADMIN — SODIUM CHLORIDE, POTASSIUM CHLORIDE, SODIUM LACTATE AND CALCIUM CHLORIDE: 600; 310; 30; 20 INJECTION, SOLUTION INTRAVENOUS at 11:27

## 2020-03-06 RX ADMIN — MAGNESIUM SULFATE IN WATER 2 G/HR: 40 INJECTION, SOLUTION INTRAVENOUS at 13:12

## 2020-03-06 RX ADMIN — INDOMETHACIN 50 MG: 25 CAPSULE ORAL at 12:26

## 2020-03-06 RX ADMIN — METRONIDAZOLE 500 MG: 500 TABLET ORAL at 14:41

## 2020-03-06 RX ADMIN — INDOMETHACIN 25 MG: 25 CAPSULE ORAL at 18:39

## 2020-03-06 NOTE — PLAN OF CARE
Patient becoming more uncomfortable with contractions, per Dr. Monreal, gave betamethasone, started magnesium, and gave first dose of indocin. FHR overall cat 1, baby B had a period of tachycardia with variable decels which resolved with patient position change. NICU updated on patient status. Will continue to closely monitor.

## 2020-03-06 NOTE — H&P
[facility]  OB History and Physical  Antepartum Admit      Sobeida Fountain    MRN# [id number]  YOB: 1990      CC:  contractions    HPI:  Ms. Sobeida Fountain is a 29 year old  at 27w4d by 8w4d US, who presents with painful contractions. She reports active fetal movement. She denies vaginal bleeding and loss of fluid.  She denies HA, changes in vision, RUQ pain, N/V/D, or increased edema.    Pregnancy notable for:  - Dichorionic triamniotic triplets  - Fetal growth restriction, intermittently elevated UAR Dopplers, velamentous cord insertion in fetus 1  - HSV-2  - Pica    Prenatal Labs:   Lab Results   Component Value Date    ABO PENDING 2020    RH Pos 2020    AS PENDING 2020    HGB 11.3 (L) 2020     GBS Status: Pending    Ultrasounds  2020  For fetus 1: Growth parameters and estimated fetal weight were consistent with asymmetric intrauterine growth restriction. There has been good interval growth since the  previous ultrasound. Fetal anatomy appeared normal for gestational age. Normal SUKHJINDER. There is intermittent increased resistance in the umbilical artery. The peak velocity in  the middle cerebral artery is less than 1.5 Multiples of the Median for this gestational age. This is below the level associated with significant fetal anemia.     For fetus 2: Growth parameters and estimated fetal weight were consistent with appropriate for gestational age pattern of growth. Fetal anatomy appeared normal for  gestational age. Normal SUKHJINDER. Normal umbilical artery doppler flow studies. The peak velocity in the middle cerebral artery is less than 1.5 Multiples of the Median for this  gestational age. This is below the level associated with significant fetal anemia.     For fetus 3: Growth parameters and estimated fetal weight were consistent with appropriate for gestational age pattern of growth. Fetal anatomy appeared normal for  gestational  age.     There is a 12% discordance between the fetus 1 and fetus 2.     Today's ultrasound does not support a diagnosis of Twin-Twin Transfusion Syndrome (TTTS) for fetus 1 and 2.    2020  1) Dichorionic triamniotic triplet pregnancy at 26 & 0/7 weeks gestational age.  2) The amniotic fluid volume appeared normal around all triplets.  3) The fetal bladder is visualized in all three triplets.  4) The umbilical artery Doppler studies are persistently elevated in fetus 1. No evidence of TTTS at this point.  5) The middle cerebral artery Doppler studies were within normal limits for triplet 1 and 2; there is no evidence of twin anemia polycythemia syndrome.       OB History  OB History    Para Term  AB Living   3 1 1 0 1 1   SAB TAB Ectopic Multiple Live Births   1 0 0 0 1      # Outcome Date GA Lbr Terrence/2nd Weight Sex Delivery Anes PTL Lv   3 Current            2 Term  39w0d       KALPANA   1 SAB                PMHx:   Past Medical History:   Diagnosis Date     Depressive disorder     both during and after     PSHx:   No past surgical history on file.  Meds:   Medications Prior to Admission   Medication Sig Dispense Refill Last Dose     acyclovir (ZOVIRAX) 400 MG tablet Take 400 mg by mouth 2 times daily   3/6/2020 at 0800     aspirin 81 MG EC tablet Take 81 mg by mouth daily   3/5/20 at 2100     ferrous sulfate (FEROSUL) 325 (65 Fe) MG tablet Take 325 mg by mouth daily (with breakfast)   3/5/2020 at 2100     calcium carbonate (TUMS) 500 MG chewable tablet Take 1 chew tab by mouth 2 times daily   Unknown at Unknown time     omeprazole 20 MG tablet Take 20 mg by mouth daily   3/6/20 at 0800     Prenatal Vit-Fe Fumarate-FA (PRENATAL VITAMIN PO) Take 2 tablets by mouth daily   3/2/20 at 2100     Allergies:  No Known Allergies   FmHx:   Family History   Family history unknown: Yes       ROS:   Complete 10-point ROS negative except as noted in HPI.  PE:  Vit:   Patient Vitals for the past 4 hrs:   BP  Temp Temp src Pulse Resp   20 1006 113/72 98.4  F (36.9  C) Oral 104 18      Gen: Anxious appearing  CV: Regular rate, well perfused  Pulm: No increased work of breathing  Abd: Soft, gravid, non-tender  Ext: trace LE edema b/l  Cx: Closed/50/soft    Presentation:    Fetus A: Cephalic  Fetus B: Cephlic  Fetus C: Transverse    Memb: Intact              FHT:   Fetus A: Baseline 145's moderate variability, + accelerations, no decelerations   Fetus B: Baseline 145's, moderate variability, + accelerations, occ variable decelerations   Fetus C: Baseline 155's, moderate variability, + accelerations, no decelerations     [place]: 3 contractions in 10 minutes      Assessment/Plan:   Ms. Sobeida Fountain is a 29 year old , at 27w4d by 8w4d, who presents with painful uterine contractions and clinical symptoms concerning for  labor.     # Labor  - 6.0g magnesium load for fetal neuro protection> 2.0g/hour maintenance  - Betamethasone course. First dose administered at 1226.   - Indomethacin for tocolysis through BMZ window. 50mg loading dose followed by 25mg every 6 hours.  - Infectious workup positive for bacterial vaginosis based on presence of clue cells and vaginal discharge. 7 day course of Metronidazole ordered. UA negative/. GBS, GC/CT pending  - FFN negative but concern for false negative as patient is having regular painful contractions.     # Dichorionic Triamniotic Triplets (Fetus 1 and 2 are monochorionic/diamniotic pair)   # Fetus 1 with fetal growth restriction (AC 3%ile), intermittently elevated UAR Dopplers, velamentous cord insertion.  Monitoring is reassuring today with category 1 tracings x3.  - No evidence of TTTS/TAPS on  ultrasound  - Patient has intensive outpatient follow-up with [facility] clinic, next appointment scheduled for Monday.    #HSV-2  On daily prophylaxis and currently asymptomatic. Denies any new lesions; no vesicles noted on exam.  - Continue acyclovir 400mg  BID    #Fetal Well Being  - Category 1 FHT x 3. Reactive and reassuring  - Fetus 1 cephalic left, fetus 2 cephalic right, fetus 3 transverse head right  - Continue EFM and [place]    # Prenatal Care  - Rh positive  - Failed early GCT, passed GTT x2  - EIF noted on fetus 3, declined additional testing  - s/p fetal echocardiogram  with normal anatomy    The patient was discussed with Dr. Monreal who is in agreement with the treatment plan.    Alicia Myhre, DO  Obstetrics and Gyncology, PGY-3  2020 , 12:13 PM     Physician Attestation   I, Nayely Monreal MD, saw this patient with the resident and agree with the resident/fellow's findings and plan of care as documented in the note.      I personally reviewed vital signs, medications, labs, imaging and EFM.    Key findings: In summary, Sobeida Fountain is a  at 27w4d admitted with di-tri triplet gestation with suspected  labor.  Agree with plan for tocolysis and magnesium sulfate for neuroprotection and BMZ course will be initiated at this time and will repeat dosing in 24 hours.  Continue close inpatient management at this time, as patient is at high risk for  delivery.     Nayely Monreal MD  Date of Service (when I saw the patient): 20    Time Spent on this Encounter   I spent 40 minutes on the unit/floor managing the care of Sobeida Fountain. Over 50% of my time was spent on the following:   - Counseling the patient and/or family regarding: diagnostic results, prognosis, risks and benefits of treatment options, recommended follow-up and medical compliance  - Coordination of care with the: nurse     In summary, Sobeida Fountain is a  at 27w4d admitted with di-tri triplet gestation with suspected  labor.  Agree with plan for tocolysis and magnesium sulfate for neuroprotection and BMZ course will be initiated at this time and will repeat dosing in 24 hours.  Continue close inpatient management at this time,  as patient is at high risk for  delivery.     Nayely Monreal MD

## 2020-03-06 NOTE — PLAN OF CARE
Patient presents from home for  LABOR evaluation. Patient brought to room 444 and oriented to room and call light. Monitors applied x3 FHRS, IV obtained. Dr. Monreal at bedside for sterile speculum exam. Patient closed upon examination. Endocervical and vaginal swabs sent to lab, results pending. Patient is having contractions every 3-4 minutes palpating mild to moderate and she is getting more uncomfortable. Per Dr. Monreal, will continue monitoring and give 500mL bolus.

## 2020-03-06 NOTE — PLAN OF CARE
Patient states she is doing better after receiving indocin and magnesium. Contractions have spaced out per patient. Babies moving as much as normal. Ok per MD for patient to have clear liquids for now- will consider regular diet this evening. Will continue with plan for continuous fetal monitoring- getting all babies intermittently due to prematurity.

## 2020-03-06 NOTE — TELEPHONE ENCOUNTER
Writer called to follow up on pt yesterday who had concerns of possible contractions. Pt reports she is in L&D being evaluated and arrived about 30 min ago. Pt appreciative of the check in call.  Juana Kelly RN

## 2020-03-07 LAB
GP B STREP DNA SPEC QL NAA+PROBE: NEGATIVE
SPECIMEN SOURCE: NORMAL

## 2020-03-07 PROCEDURE — 25000128 H RX IP 250 OP 636: Performed by: STUDENT IN AN ORGANIZED HEALTH CARE EDUCATION/TRAINING PROGRAM

## 2020-03-07 PROCEDURE — 25000132 ZZH RX MED GY IP 250 OP 250 PS 637: Performed by: STUDENT IN AN ORGANIZED HEALTH CARE EDUCATION/TRAINING PROGRAM

## 2020-03-07 PROCEDURE — 12000001 ZZH R&B MED SURG/OB UMMC

## 2020-03-07 RX ORDER — HYDROXYZINE HYDROCHLORIDE 25 MG/1
25-50 TABLET, FILM COATED ORAL
Status: DISCONTINUED | OUTPATIENT
Start: 2020-03-07 | End: 2020-03-09 | Stop reason: HOSPADM

## 2020-03-07 RX ORDER — ONDANSETRON 4 MG/1
4 TABLET, ORALLY DISINTEGRATING ORAL EVERY 6 HOURS PRN
Status: DISCONTINUED | OUTPATIENT
Start: 2020-03-07 | End: 2020-03-09 | Stop reason: HOSPADM

## 2020-03-07 RX ADMIN — PRENATAL VITAMINS-IRON FUMARATE 27 MG IRON-FOLIC ACID 0.8 MG TABLET 1 TABLET: at 08:02

## 2020-03-07 RX ADMIN — ACYCLOVIR 400 MG: 400 TABLET ORAL at 09:42

## 2020-03-07 RX ADMIN — CALCIUM CARBONATE (ANTACID) CHEW TAB 500 MG 500 MG: 500 CHEW TAB at 00:14

## 2020-03-07 RX ADMIN — METRONIDAZOLE 500 MG: 500 TABLET ORAL at 08:02

## 2020-03-07 RX ADMIN — OMEPRAZOLE 20 MG: 20 CAPSULE, DELAYED RELEASE ORAL at 08:02

## 2020-03-07 RX ADMIN — INDOMETHACIN 25 MG: 25 CAPSULE ORAL at 00:14

## 2020-03-07 RX ADMIN — CALCIUM CARBONATE (ANTACID) CHEW TAB 500 MG 500 MG: 500 CHEW TAB at 08:00

## 2020-03-07 RX ADMIN — INDOMETHACIN 25 MG: 25 CAPSULE ORAL at 18:17

## 2020-03-07 RX ADMIN — INDOMETHACIN 25 MG: 25 CAPSULE ORAL at 08:28

## 2020-03-07 RX ADMIN — METRONIDAZOLE 500 MG: 500 TABLET ORAL at 00:14

## 2020-03-07 RX ADMIN — HYDROXYZINE HYDROCHLORIDE 50 MG: 25 TABLET, FILM COATED ORAL at 23:00

## 2020-03-07 RX ADMIN — ONDANSETRON 4 MG: 4 TABLET, ORALLY DISINTEGRATING ORAL at 10:33

## 2020-03-07 RX ADMIN — METRONIDAZOLE 500 MG: 500 TABLET ORAL at 20:00

## 2020-03-07 RX ADMIN — INDOMETHACIN 25 MG: 25 CAPSULE ORAL at 12:15

## 2020-03-07 RX ADMIN — ACYCLOVIR 400 MG: 400 TABLET ORAL at 21:00

## 2020-03-07 RX ADMIN — BETAMETHASONE SODIUM PHOSPHATE AND BETAMETHASONE ACETATE 12 MG: 3; 3 INJECTION, SUSPENSION INTRA-ARTICULAR; INTRALESIONAL; INTRAMUSCULAR at 12:15

## 2020-03-07 RX ADMIN — CALCIUM CARBONATE (ANTACID) CHEW TAB 500 MG 500 MG: 500 CHEW TAB at 21:00

## 2020-03-07 NOTE — DISCHARGE SUMMARY
[facility] Discharge Summary    Sobeida Fountain MRN# [id number]   Age: 29 year old YOB: 1990     Date of Admission:  3/6/2020  Date of Discharge:  3/9/2020  Admitting Physician:  Nayely Monreal MD  Discharge Physician:  Angelika Wilson MD     Admission Diagnosis:  -Di/Tri triplet gestation at 27w0d  -Fetal growth restriction, intermittently elevated UAR Dopplers, velamentous cord insertion in fetus 1  - contractions    Discharge Diagnosis:  - Same as above  - Bacterial vaginosis    Procedures:    Electronic fetal monitoring  MFM ultrasound  Betamethasone course  Magnesium sulfate for fetal neuroprotection  Indomethacin for Tocolysis    Consultations: Neonatology    Medications prior to admission:  Medications Prior to Admission   Medication Sig Dispense Refill Last Dose     acyclovir (ZOVIRAX) 400 MG tablet Take 400 mg by mouth 2 times daily   3/6/2020 at 0800     aspirin 81 MG EC tablet Take 81 mg by mouth daily   3/5/20 at 2100     ferrous sulfate (FEROSUL) 325 (65 Fe) MG tablet Take 325 mg by mouth daily (with breakfast)   3/5/2020 at 2100     calcium carbonate (TUMS) 500 MG chewable tablet Take 1 chew tab by mouth 2 times daily   Unknown at Unknown time     omeprazole 20 MG tablet Take 20 mg by mouth daily   3/6/20 at 0800     Prenatal Vit-Fe Fumarate-FA (PRENATAL VITAMIN PO) Take 2 tablets by mouth daily   3/2/20 at 2100       Brief History of Presentation:    Ms. Sobeida Fountain is a 29 year old  at 27w4d by 8w4d US, who presented with painful contractions and concern for pre-term labor. She had been otherwise feeling well without headaches, vision changes, RUQ pain, nausea, vomiting, vaginal bleeding, loss of fluid or increased edema.    Please see her admit H&P for full details of her PMH, PSH, Meds, Allergies and exam on admit.    Hospital Course:  Sobeida was started on magnesium and given a course of BMZ. She was also given a 48 hour  course of indomethacin. Her infectious workup was positive for BV. She was treated with a 7 day course of metronidazole. Her babies were monitored continuously on admission and then three times daily. Their tracings were appropriate for gestational age. Cervical exam was stable throughout admission. On HD#4 she was no longer judy regularly and was deemed stable for discharge to home.     Discharge Instructions:  Call or present to labor and delivery if you experience:   -Regular painful contractions concerning for labor   -Leakage of fluid concerning for ruptured membranes   -Decreased fetal movement   -Bright red vaginal bleeding    -Headache, vision changes, upper abdominal pain, significant increase in swelling,   generalized unwell feeling    Follow up:  Follow up in [facility] clinic on 3/13/2020      Discharge Medications:     Review of your medicines      START taking      Dose / Directions   metroNIDAZOLE 500 MG tablet  Commonly known as:  FLAGYL  Indication:  bacterial vaginosis  Used for:  Bacterial vaginosis in pregnancy      Dose:  500 mg  Take 1 tablet (500 mg) by mouth 2 times daily  Quantity:  7 tablet  Refills:  0        CONTINUE these medicines which have NOT CHANGED      Dose / Directions   acyclovir 400 MG tablet  Commonly known as:  ZOVIRAX      Dose:  400 mg  Take 400 mg by mouth 2 times daily  Refills:  0     aspirin 81 MG EC tablet      Dose:  81 mg  Take 81 mg by mouth daily  Refills:  0     calcium carbonate 500 MG chewable tablet  Commonly known as:  TUMS      Dose:  1 chew tab  Take 1 chew tab by mouth 2 times daily  Refills:  0     ferrous sulfate 325 (65 Fe) MG tablet  Commonly known as:  FEROSUL      Dose:  325 mg  Take 325 mg by mouth daily (with breakfast)  Refills:  0     omeprazole 20 MG tablet  Indication:  Heartburn      Dose:  20 mg  Take 20 mg by mouth daily  Refills:  0     PRENATAL VITAMIN PO      Dose:  2 tablet  Take 2 tablets by mouth daily  Refills:  0           Where to  get your medicines      These medications were sent to [id number] - [place], [place] - [address] [facility]  [address], [place] [address]    Phone:  [phone]     metroNIDAZOLE 500 MG tablet         Alicia Myhre, DO  Obstetrics and Gyncology, PGY-3  March 10, 2020 , 1:37 AM     MFM Attending Addendum    I, Angelika Wilson MD, saw and evaluated this patient prior to discharge.  I have discussed the care of Ms. Fountain with the medical student/resident/fellow and agree with the plan of care as documented above.      I personally reviewed vital signs, medications, labs and imaging.      I personally spent a total of 30 minutes with Sobeida Fountain on discharge activities including calling guidelines.  Appropriate follow-up in place.    Date of service (when I saw the patient): March 9, 2020      Angelika Wilson MD  , OB/GYN  Maternal-Fetal Medicine  [email or web address]  [phone] (Academic office)  [phone] (Pager)

## 2020-03-07 NOTE — PLAN OF CARE
VSS. Pt denies LOF, pain, or contractions. Second beta given today at 1215. FHR AGA, moderate variability, accels present, Fetus A with occasional variable decel - see flowsheet. Significant other here for most of morning. Pt feeling much better and hoping to discharge Monday. Continue plan of care.

## 2020-03-07 NOTE — PLAN OF CARE
Pt sitting up eating. Babies A & B are not tracing well at this time. Pt asked to let RN know as soon as she is finished eating and can be repositioned for the monitoring of the FHR's

## 2020-03-07 NOTE — PLAN OF CARE
Patient states she is doing better and contractions have spaced out. Via toco, x2 in past hour. Patient appears comfortable. Per SVE by Ebony SVE unchanged- closed/soft. FHR A 135, FHR B 135, FHR C 145 all with moderate variability. Accels present, decels not present. RADHA Carmona is going to go home now for some sleep and will come back later. Patient is comfortable with him leaving for now and will call him if anything changes. NNP from NICU at bedside now for consult. Will continue with plan of care- indocin every 6 hours, continuous monitoring, magnesium running for 12 hours for neuro protection (tentatively off at 0030). Second dose of betamethasone due tomorrow at 1230.

## 2020-03-07 NOTE — PROVIDER NOTIFICATION
03/07/20 1535   Provider Notification   Provider Name/Title Dr. Fitzpatrick   Method of Notification Electronic Page   Request Evaluate in Person   Notification Reason Decels   FHR strip reviewed with Dr. Fitzpatrick. Difficult to determine if decel was baby A or B due to difficulty tracing. Per MD get another 20 min reactive FHR strip of A and B. Baby C AGA - see flowsheet.

## 2020-03-07 NOTE — PROVIDER NOTIFICATION
03/07/20 1133   Provider Notification   Provider Name/Title Dr. Fitzpatrick   Method of Notification Electronic Page   Request Evaluate - Remote   Notification Reason Other (Comment)   Pt has order for cont. monitoring. Note says TID. Can you change order? Thanks

## 2020-03-07 NOTE — PROGRESS NOTES
Pt. Feeling nauseated.  And states she felt dizzy after being up about 10 minutes. And this has happened at home but resolves after she sits. Will call for a zofran order

## 2020-03-07 NOTE — PLAN OF CARE
Pt able to sleep part of the night. Magnesium stopped this am, Pt tolerated well. Pt denies any pain, blurred vision or other symptoms. VSS. Pt is to go to antepartum for the weekend for observation. Expect to continue with care of triplet pregnancy. Goal is to go to 34 weeks gestation. Pt is aware of plan of care. See FHR tracing for further details.

## 2020-03-07 NOTE — PROGRESS NOTES
Pt. Transferred to Ante 404.  Her nausea is a bit better, zofran given. She is able to walk to ante

## 2020-03-07 NOTE — PROGRESS NOTES
MFM progress note:    S: patient is doing very well.  After magnesium was turned off at midnight she slept through the night.  Denies cramping or contractions currently.  +FM x 3.  Denies leaking or bleeding.     O:   Vitals:    20 0018 20 0602 20 0748 20 0830   BP: 109/62 98/51 96/54 94/51   Pulse:       Resp: 16 16  16   Temp: 97.9  F (36.6  C) 98.1  F (36.7  C) 97.8  F (36.6  C)    TempSrc: Oral Oral Oral    SpO2:         Gen: NAD, appears comfortable  Abd: gravid, soft, non tender  Ext: no edema    A/P: 28 y/o  @ 27 5/7 weeks with Di/Tri triplets and  contractions concerning for  labor.     -  contractions/labor- cervix remains closed, but soft,  S/p magnesium sulfate x 12 hrs.  Continue indocin x 48 hrs.  2nd dose of BMZ today.  Move to ante. Continue flagyl for BV. Continue HSV prophylaxis.  Plan to observe through the weekend.    - Di/Tri triplets with FGR for fetus 1 (mo/di pair) and velamentous CI.  TID fetal monitoring or more as indicated.  TTTS eval on Monday while on Ante.  NNP/NICU consult this week.     I spent a total of 15 min of face to face time in counseling Ms. Fountain regarding Di/Tri triplets at 27 5/7 weeks with  contractions.     Leni Beck DO FACOG  Maternal Fetal Medicine Specialist  Pager: [phone]  Mobile: [phone]

## 2020-03-08 ENCOUNTER — APPOINTMENT (OUTPATIENT)
Dept: ULTRASOUND IMAGING | Facility: CLINIC | Age: 30
End: 2020-03-08
Payer: COMMERCIAL

## 2020-03-08 LAB
C TRACH DNA SPEC QL NAA+PROBE: NEGATIVE
N GONORRHOEA DNA SPEC QL NAA+PROBE: NEGATIVE
SPECIMEN SOURCE: NORMAL
SPECIMEN SOURCE: NORMAL

## 2020-03-08 PROCEDURE — 25000132 ZZH RX MED GY IP 250 OP 250 PS 637: Performed by: STUDENT IN AN ORGANIZED HEALTH CARE EDUCATION/TRAINING PROGRAM

## 2020-03-08 PROCEDURE — 12000001 ZZH R&B MED SURG/OB UMMC

## 2020-03-08 PROCEDURE — 93971 EXTREMITY STUDY: CPT | Mod: LT

## 2020-03-08 RX ADMIN — OMEPRAZOLE 20 MG: 20 CAPSULE, DELAYED RELEASE ORAL at 09:00

## 2020-03-08 RX ADMIN — CALCIUM CARBONATE (ANTACID) CHEW TAB 500 MG 500 MG: 500 CHEW TAB at 09:00

## 2020-03-08 RX ADMIN — METRONIDAZOLE 500 MG: 500 TABLET ORAL at 22:17

## 2020-03-08 RX ADMIN — ACYCLOVIR 400 MG: 400 TABLET ORAL at 11:34

## 2020-03-08 RX ADMIN — ACYCLOVIR 400 MG: 400 TABLET ORAL at 22:31

## 2020-03-08 RX ADMIN — PRENATAL VITAMINS-IRON FUMARATE 27 MG IRON-FOLIC ACID 0.8 MG TABLET 1 TABLET: at 09:00

## 2020-03-08 RX ADMIN — INDOMETHACIN 25 MG: 25 CAPSULE ORAL at 07:49

## 2020-03-08 RX ADMIN — INDOMETHACIN 25 MG: 25 CAPSULE ORAL at 01:00

## 2020-03-08 RX ADMIN — CALCIUM CARBONATE (ANTACID) CHEW TAB 500 MG 500 MG: 500 CHEW TAB at 22:17

## 2020-03-08 RX ADMIN — METRONIDAZOLE 500 MG: 500 TABLET ORAL at 09:00

## 2020-03-08 NOTE — PLAN OF CARE
Pt requesting that her FHR monitoring to be postponed until after 0800 so that she is able to rest

## 2020-03-08 NOTE — PROVIDER NOTIFICATION
"   03/07/20 0683   Provider Notification   Provider Name/Title Dr. Eduardo   Method of Notification In Department   Request Evaluate - Remote   Notified Dr. Eduardo that Pt reports feeling \"pressure\" to lower abdomen while being monitored but not seeing any contractions while on monitor. Plan to remove FHR US and continue to monitor for contractions  "

## 2020-03-08 NOTE — PROVIDER NOTIFICATION
"   03/07/20 8162   Uterine Activity Assessment   Method external tocotransducer   Contraction Frequency (Minutes) none   Contraction Intensity no contractions   Uterine Resting Tone soft by palpation   Pt reports that after all monitors removed from abdomen except the toco she is no longer feeling \"pressure\". No contractions found while monitoring uterus only. Removed toco after 30 minutes of consistent monitoring and no changes.   "

## 2020-03-08 NOTE — PROGRESS NOTES
MFM progress note:    S: Patient is doing very well  Denies cramping or contractions currently.  +FM x 3.  Denies leaking or bleeding.     O:   Vitals:    20 1444 20 1835 20 2306 20 0737   BP: 111/56 115/66 123/58 99/48   Pulse:       Resp: 18 16 16    Temp: 98.1  F (36.7  C) 98  F (36.7  C) 97.8  F (36.6  C) 98.8  F (37.1  C)   TempSrc: Oral Oral Oral Oral   SpO2:         Gen: NAD, appears comfortable  Abd: gravid, soft, non tender  Ext: no edema    Baby A: BL wnl, mod variability, accels present, intermittent variable decels  Baby B: BL wnl, mod variability, accels present, intermittent variable decels  Baby: BL wnl, mod variability, accels present, intermittent variable decels    [place]: Rare ctx    A/P: 30 y/o  @ 27 6/7 weeks with Di/Tri triplets and  contractions concerning for  labor.     -  contractions/labor- cervix remains closed, but soft,  S/p magnesium sulfate x 12 hrs. S/p 48 hours indocin.  S/p BMZ x2.  Move to ante. Continue flagyl for BV. Continue HSV prophylaxis.  Plan to observe through the weekend.      - Di/Tri triplets with FGR for fetus 1 (mo/di pair) and velamentous CI.  TID fetal monitoring or more as indicated.  TTTS eval on Monday while on Ante.  NNP/NICU consult today if able.    Chi Fitzpatrick MD  Obstetrics & Gynecology, PGY3    Physician Attestation   I, Leni Beck DO, saw and evaluated Sobeida [facility] with the resident.     I personally reviewed the vital signs, medications, labs and imaging.    My key history or physical exam findings:   Patient feels like her contractions have stopped.  Slept well.  +FM x 3.  No leaking or bleeding.      Key management decisions made by me:   S/p 2nd dose of BMZ.  S/p last dose of indocin.  Plan to observe off tocolytics until tomorrow.  Due for TTTS/Dopplers tomorrow.  Desires NICU consult/tour today .    Leni Beck, DO  Date of Service (when I saw the patient):  20    Time Spent on this Encounter   I, Leni Beck DO, spent a total of 15 minutes face to face or coordinating care of Sobeida Fountain.  Over 50% of my time on the unit was spent counseling the patient and/or coordinating care regarding  contractions, possible  labor.

## 2020-03-09 ENCOUNTER — TELEPHONE (OUTPATIENT)
Dept: CARE COORDINATION | Facility: CLINIC | Age: 30
End: 2020-03-09

## 2020-03-09 ENCOUNTER — HOSPITAL ENCOUNTER (INPATIENT)
Dept: ULTRASOUND IMAGING | Facility: CLINIC | Age: 30
End: 2020-03-09
Payer: COMMERCIAL

## 2020-03-09 ENCOUNTER — TELEPHONE (OUTPATIENT)
Dept: MATERNAL FETAL MEDICINE | Facility: CLINIC | Age: 30
End: 2020-03-09

## 2020-03-09 VITALS
DIASTOLIC BLOOD PRESSURE: 60 MMHG | TEMPERATURE: 98.2 F | HEART RATE: 104 BPM | RESPIRATION RATE: 18 BRPM | SYSTOLIC BLOOD PRESSURE: 112 MMHG | OXYGEN SATURATION: 99 %

## 2020-03-09 DIAGNOSIS — O30.112 TRIPLET GESTATION WITH TWO OR MORE MONOCHORIONIC FETUSES IN SECOND TRIMESTER: Primary | ICD-10-CM

## 2020-03-09 PROBLEM — O30.109 TRIPLET GESTATION: Status: ACTIVE | Noted: 2020-03-06

## 2020-03-09 PROCEDURE — 25000132 ZZH RX MED GY IP 250 OP 250 PS 637: Performed by: STUDENT IN AN ORGANIZED HEALTH CARE EDUCATION/TRAINING PROGRAM

## 2020-03-09 PROCEDURE — 76819 FETAL BIOPHYS PROFIL W/O NST: CPT | Mod: 59

## 2020-03-09 PROCEDURE — 76820 UMBILICAL ARTERY ECHO: CPT | Mod: 59 | Performed by: OBSTETRICS & GYNECOLOGY

## 2020-03-09 PROCEDURE — 25000128 H RX IP 250 OP 636: Performed by: OBSTETRICS & GYNECOLOGY

## 2020-03-09 PROCEDURE — 90715 TDAP VACCINE 7 YRS/> IM: CPT | Performed by: OBSTETRICS & GYNECOLOGY

## 2020-03-09 RX ORDER — METRONIDAZOLE 500 MG/1
500 TABLET ORAL 2 TIMES DAILY
Qty: 7 TABLET | Refills: 0 | Status: ON HOLD | OUTPATIENT
Start: 2020-03-09 | End: 2020-04-17

## 2020-03-09 RX ADMIN — CALCIUM CARBONATE (ANTACID) CHEW TAB 500 MG 500 MG: 500 CHEW TAB at 08:01

## 2020-03-09 RX ADMIN — CLOSTRIDIUM TETANI TOXOID ANTIGEN (FORMALDEHYDE INACTIVATED), CORYNEBACTERIUM DIPHTHERIAE TOXOID ANTIGEN (FORMALDEHYDE INACTIVATED), BORDETELLA PERTUSSIS TOXOID ANTIGEN (GLUTARALDEHYDE INACTIVATED), BORDETELLA PERTUSSIS FILAMENTOUS HEMAGGLUTININ ANTIGEN (FORMALDEHYDE INACTIVATED), BORDETELLA PERTUSSIS PERTACTIN ANTIGEN, AND BORDETELLA PERTUSSIS FIMBRIAE 2/3 ANTIGEN 0.5 ML: 5; 2; 2.5; 5; 3; 5 INJECTION, SUSPENSION INTRAMUSCULAR at 13:31

## 2020-03-09 RX ADMIN — PRENATAL VITAMINS-IRON FUMARATE 27 MG IRON-FOLIC ACID 0.8 MG TABLET 1 TABLET: at 08:01

## 2020-03-09 RX ADMIN — HYDROXYZINE HYDROCHLORIDE 50 MG: 25 TABLET, FILM COATED ORAL at 00:14

## 2020-03-09 RX ADMIN — ACYCLOVIR 400 MG: 400 TABLET ORAL at 09:21

## 2020-03-09 RX ADMIN — OMEPRAZOLE 20 MG: 20 CAPSULE, DELAYED RELEASE ORAL at 08:01

## 2020-03-09 RX ADMIN — METRONIDAZOLE 500 MG: 500 TABLET ORAL at 08:01

## 2020-03-09 NOTE — DISCHARGE INSTRUCTIONS
Discharge Instruction for Undelivered Patients      You were seen for: Labor Assessment  We Consulted: [facility] team  You had (Test or Medicine): labs, medications, ultrasounds, and monitoring     Diet:   Drink 8 to 12 glasses of liquids (milk, juice, water) every day.  You may eat meals and snacks.     Activity:  Count fetal kicks everyday (see handout)  Call your doctor or nurse midwife if your baby is moving less than usual.     Call your provider if you notice:  Swelling in your face or increased swelling in your hands or legs.  Headaches that are not relieved by Tylenol (acetaminophen).  Changes in your vision (blurring: seeing spots or stars.)  Nausea (sick to your stomach) and vomiting (throwing up).   Weight gain of 5 pounds or more per week.  Heartburn that doesn't go away.  Signs of bladder infection: pain when you urinate (use the toilet), need to go more often and more urgently.  The bag of lala (rupture of membranes) breaks, or you notice leaking in your underwear.  Bright red blood in your underwear.  Abdominal (lower belly) or stomach pain.  For first baby: Contractions (tightening) less than 5 minutes apart for one hour or more.  Second (plus) baby: Contractions (tightening) less than 10 minutes apart and getting stronger.  *If less than 34 weeks: Contractions (tightenings) more than 6 times in one hour.  Increase or change in vaginal discharge (note the color and amount)    Follow-up:  [facility] clinic will be calling you to set up a follow up appointment.

## 2020-03-09 NOTE — PLAN OF CARE
Sobeida Fountain admitted for evaluation and treatment of  Labor .    VitalsBlood pressure 112/60, pulse 104, temperature 98.2  F (36.8  C), temperature source Oral, resp. rate 18, last menstrual period 2019, SpO2 99 %.  Fetal status: Appropriate for Gestational Age.  Lab tests were done, results: in chart.    Medications given during stay: Flagyl, Indocin, Magnesium and Betamethasone, medications prescribed at discharge Flagyl.    Written instructions given to patient.  Additional documents provided:  labor & Fetal kick counts.  Copy in electronic medical record.  Discharged Home undelivered,  labor instuctions given, follow up clinic appointment: [facility] clinic to call pt. Discharged in stable condition at 1338.

## 2020-03-09 NOTE — PLAN OF CARE
AntepartumShift Note  Data: Contraction pattern stable and within parameters. Fetal assessment  see note .  VSS. Pt requesting 0600 monitoring be moved to 0800 to promote rest.   Interventions: Continue uterine/fetal assessment 3 times daily. Activity level:Regular activity. Encourage active range of motion and frequent position changes.  Plan: Continue expectant management. Observe for and notify care provider of indications of progressing labor or signs of fetal/maternal compromise.

## 2020-03-09 NOTE — TELEPHONE ENCOUNTER
Patient calling back to [facility]. Informed of appt 2x/week now after discharge on 3/9. Pt given dates and times.    Angelika Lopez RN

## 2020-03-09 NOTE — PROGRESS NOTES
Notified by RN of left lower extremity edema. Patient evaluated at bedside. Patient denies pain. Left lower extremity +1 non-pitting edema compared to trace edema in right lower extremity. Calf non-tender, negative Homans sign. LLE Doppler ordered.     Alicia Myhre, DO  Obstetrics and Gyncology, PGY-3  March 8, 2020 , 10:12 PM

## 2020-03-09 NOTE — PROGRESS NOTES
Called by RN to assess FHT with ultrasound due to difficulty differentiating between fetus A and B. BSUS performed with fetus B initially presenting first. A & B both cephalic. Both fetuses active with FHT ranging 140-150. Fetus A directly overlying fetus B. Monitored intermittently with BSUS over a period of 45 minutes. Fetal heart rates for A and B with baseline 140s. Active fetal movement visualized. Overall reassuring.     Fetus C: 150 baseline, moderate variability, present accels absent decels.     Alicia Myhre, DO  Obstetrics and Gyncology, PGY-3  March 9, 2020 , 12:08 AM

## 2020-03-09 NOTE — PROVIDER NOTIFICATION
03/08/20 9875   Provider Notification   Provider Name/Title Dr Myhre    Method of Notification At Bedside   Request Evaluate in Person   Notification Reason Other (Comment)   Difficulty differentiating babies. Dr Myhre at bedside with ultrasound.

## 2020-03-09 NOTE — PROVIDER NOTIFICATION
03/09/20 1145   Provider Notification   Provider Name/Title Dr. Wilson   Method of Notification In Department   Request Evaluate in Person   Notification Reason Other (Comment)     In communication with Dr. Wilson several time during monitoring regarding difficulty of monitoring baby A. She did a bedside ultrasound to help with placement of FHR monitor for Baby A. Dr. Wilson asked to review tracing as a whole so pt could come off monitoring. Dr. Wilson reviewed and stated pt was ok to come off FHR monitor and she would be down to do a cervical exam prior to placing discharge orders.

## 2020-03-09 NOTE — PROGRESS NOTES
Maternal-Fetal Medicine Attending     Late entry, patient seen several hours ago.      S: Pt denies contractions, LOF, VB.  Reports + FM x3.  Is eager to go home.      O:  /60   Pulse 104   Temp 98.2  F (36.8  C) (Oral)   Resp 18   LMP 2019   SpO2 99%   GEN: NAD  ABD: gravid, NT  FHT 1: 150 w/ moderate variability, +10 x10 A, no D  FHT 2: 150 w/ moderate variability, +10 x10 A, one variable deceleration to 90s for 90 seconds, resolved with position change  FHT 3: 155 w/ moderate variability, +10 x10 A, no D  TOCO: no contractions  NST interpretation for today: reasssuring/appropriate for GA x3  SVE: closed/50%/soft/mid    A/P: 29 year old  28w0d with DCTA triplets admitted with  contractions, no cervical change since day of admission.  No contractions on the monitor, cervix remains closed.  Status post betamethasone.  Stable for discharge home with close outpatient follow-up.  Will plan for twice weekly MFM visits due to intermittent increased UA Dopplers for triplet 1 with asymmetric FGR and to monitor for signs and symptoms of PTL.  High risk for early delivery due to triplet gestation.      I spent a total of 30 minutes face-to-face or coordinating care of Sobeida Fountain.  More than 50% of my time on the unit was spent counseling and/or coordinating care.    Date of service (when I saw the patient): 2020      Angelika Wilson MD  , OB/GYN  Maternal-Fetal Medicine  [email or web address]  [phone] (Academic office)  [phone] (Pager)

## 2020-03-10 ENCOUNTER — TELEPHONE (OUTPATIENT)
Dept: CARE COORDINATION | Facility: CLINIC | Age: 30
End: 2020-03-10

## 2020-03-10 ENCOUNTER — TELEPHONE (OUTPATIENT)
Dept: MATERNAL FETAL MEDICINE | Facility: CLINIC | Age: 30
End: 2020-03-10

## 2020-03-10 NOTE — TELEPHONE ENCOUNTER
Spoke with Sobeida, she is feeling well since discharge and denies any ctx, LOF, bleeding. Pt states she did order an abdominal binder, hoping it will help. Sobeida states she did connect with Janneth with SW today. Writer informed pt we did cancel her NICU/SW visit on Monday. Pt VU. No further questions. Confirmed appointment on Friday at 0930.   Juana Kelly RN

## 2020-03-11 ENCOUNTER — HOSPITAL ENCOUNTER (OUTPATIENT)
Dept: ULTRASOUND IMAGING | Facility: CLINIC | Age: 30
End: 2020-03-11
Attending: OBSTETRICS & GYNECOLOGY
Payer: COMMERCIAL

## 2020-03-11 ENCOUNTER — OFFICE VISIT (OUTPATIENT)
Dept: MATERNAL FETAL MEDICINE | Facility: CLINIC | Age: 30
End: 2020-03-11
Attending: OBSTETRICS & GYNECOLOGY
Payer: COMMERCIAL

## 2020-03-11 ENCOUNTER — TELEPHONE (OUTPATIENT)
Dept: CARE COORDINATION | Facility: CLINIC | Age: 30
End: 2020-03-11

## 2020-03-11 DIAGNOSIS — O30.109: ICD-10-CM

## 2020-03-11 DIAGNOSIS — O30.113 TRIPLET GESTATION WITH TWO OR MORE MONOCHORIONIC FETUSES IN THIRD TRIMESTER: Primary | ICD-10-CM

## 2020-03-11 PROCEDURE — 76815 OB US LIMITED FETUS(S): CPT

## 2020-03-11 NOTE — NURSING NOTE
Patient presents to [facility] at 28w2d with c/o not feeling baby B since Monday 3/9 (day of discharge from antepartum). States feeling good fetal movement from babies A and C. Pt denies bldg/lof/change in discharge/contractions. Ultrasound completed. Dr. Beck met with pt and discussed POC. Plan to return to [facility] on Friday as scheduled. Pt encouraged to call with any concerns. Pt discharged stable and ambulatory.       Angelika Lopez RN

## 2020-03-11 NOTE — TELEPHONE ENCOUNTER
[facility]""  MATERNAL CHILD HEALTH SOCIAL WORK CONSULT    DATA:     Spoke with pt via telephone today to introduce self and [facility] SW services and to assess needs and offer support. Triplet babies will be pt's second, third, and fourth child. Pt has a five year old daughter.    FOB/boyfriend is Mathew Carrillo. Couple live together and pt reports he is involved and supportive. Family currently resides in [place], [place] ([facility]). Pt reports having reliable access to personal transportation.    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    FOB is also a mack. He plans to work through delivery, and then will plan to take some time off while babies are in the NICU and then again when they are transitioning from the hospital to home. Pt expressed some concerns re: finances when RADHA takes unpaid time away to be with his family. SW shared information about parking assistance during NICU stay and also shared about family's eligibility to apply for one-time rent assistance through community financial resource, [employer].    Pt is connected to a  through her insurance, who has been helping her connect to local community pregnancy resources. Pt is obtaining car seats for her children through community organization, [employer]. She also plans to take birthing classes.     INTERVENTION:       Introduced self and SW role.     Chart review.     Initial psychosocial assessment completed to assess for needs, offer support, and assess for coping.     SW provided supportive counseling and appropriate resources related to the impact of upcoming hospitalization on pt and her family system.     Provided SW contact information.     Reviewed orientation to the NICU, including: parking passes, rounding, treatment teams, primary nursing, and  our welcoming policy of visitation.     SW shared information about hospital amenities and community resources.     SW provided emotional support and active listening.    ASSESSMENT:     Pt appears to be resilient and able to utilize her strengths to cope in the face of this unexpected triplet pregnancy. She appears to be coping adequately at this time. She seems appropriately overwhelmed about the transition to parenting triplet babies, but seems to have good support and seems well connected to community resources. Pt was appreciative of and receptive to SW support during her [facility] journey. She is aware of social work support and availability and has SW contact information.    PLAN:     SW will continue to assess needs and provide ongoing psychosocial support and access to resources. SW will continue to collaborate with the multidisciplinary team.    YUMIKO Moulton, [facility] CenterSW  Clinical   Maternal Child Health  [facility]  Phone:   [phone]  Pager:    [phone]

## 2020-03-13 ENCOUNTER — OFFICE VISIT (OUTPATIENT)
Dept: MATERNAL FETAL MEDICINE | Facility: CLINIC | Age: 30
End: 2020-03-13
Attending: OBSTETRICS & GYNECOLOGY
Payer: COMMERCIAL

## 2020-03-13 ENCOUNTER — HOSPITAL ENCOUNTER (OUTPATIENT)
Dept: ULTRASOUND IMAGING | Facility: CLINIC | Age: 30
End: 2020-03-13
Attending: OBSTETRICS & GYNECOLOGY
Payer: COMMERCIAL

## 2020-03-13 DIAGNOSIS — O30.112 TRIPLET GESTATION WITH TWO OR MORE MONOCHORIONIC FETUSES IN SECOND TRIMESTER: ICD-10-CM

## 2020-03-13 DIAGNOSIS — O30.113 TRIPLET GESTATION WITH TWO OR MORE MONOCHORIONIC FETUSES IN THIRD TRIMESTER: Primary | ICD-10-CM

## 2020-03-13 PROCEDURE — 76819 FETAL BIOPHYS PROFIL W/O NST: CPT | Mod: 59 | Performed by: OBSTETRICS & GYNECOLOGY

## 2020-03-13 PROCEDURE — 76816 OB US FOLLOW-UP PER FETUS: CPT

## 2020-03-13 NOTE — PROGRESS NOTES
Please see full imaging report from [employer] program under imaging tab.      Ok Baires MD  Maternal Fetal Medicine

## 2020-03-16 ENCOUNTER — HOSPITAL ENCOUNTER (OUTPATIENT)
Dept: ULTRASOUND IMAGING | Facility: CLINIC | Age: 30
End: 2020-03-16
Attending: OBSTETRICS & GYNECOLOGY
Payer: COMMERCIAL

## 2020-03-16 ENCOUNTER — OFFICE VISIT (OUTPATIENT)
Dept: MATERNAL FETAL MEDICINE | Facility: CLINIC | Age: 30
End: 2020-03-16
Attending: OBSTETRICS & GYNECOLOGY
Payer: COMMERCIAL

## 2020-03-16 VITALS
BODY MASS INDEX: 36.76 KG/M2 | SYSTOLIC BLOOD PRESSURE: 118 MMHG | HEART RATE: 106 BPM | RESPIRATION RATE: 18 BRPM | WEIGHT: 234.7 LBS | DIASTOLIC BLOOD PRESSURE: 67 MMHG | OXYGEN SATURATION: 97 %

## 2020-03-16 DIAGNOSIS — O30.112 TRIPLET GESTATION WITH TWO OR MORE MONOCHORIONIC FETUSES IN SECOND TRIMESTER: ICD-10-CM

## 2020-03-16 PROCEDURE — 76816 OB US FOLLOW-UP PER FETUS: CPT

## 2020-03-16 PROCEDURE — 76821 MIDDLE CEREBRAL ARTERY ECHO: CPT | Performed by: OBSTETRICS & GYNECOLOGY

## 2020-03-16 PROCEDURE — G0463 HOSPITAL OUTPT CLINIC VISIT: HCPCS

## 2020-03-16 ASSESSMENT — PAIN SCALES - GENERAL: PAINLEVEL: NO PAIN (0)

## 2020-03-16 NOTE — PROGRESS NOTES
Maternal fetal Medicine OB Follow up visit.      Sobeida Fountain  : 1990  MRN: [id number]     CC: OB Follow-up     Subjective:  Feeling well today. She denies any contractions, leakage of fluid, vaginal bleeding, or other systemic symptoms. Confirms active fetal movement. She denies any headache, changes in vision, chest pain, shortness of breath, or other systemic symptoms.      Objective:   Gen: NAD  Resp: Normal respiratory effort    OB Ultrasound:  See imaging tab for results from today's ultrasound     Assessment/Plan:  Sobeida Fountain is a 29 year old  female at 29w0d  by 14w0d US, here for follow OB visit.     Pregnancy has been complicated by:   # Dichorionic Triamniotic Triplets (Fetus 1 and 2 are monochorionic/diamniotic pair)   # Fetus 1 with fetal growth restriction (AC 3.5%ile), intermittently elevated UAR Dopplers, velamentous cord insertion.  - Patient previously counseled about maternal and pregnancy risks of triplet pregnancies.  - Fetal anatomy completed, EIF on fetus 3, declined testing.   - Fetal echo on 2020, without evidence of CHD on 3 fetuses.  ECHO to be performed within 24-48 hr.  - TTTS/TAPS check for mono-di pair -no sonographic signs today.  - Monitoring twice weekly UAR Doppler and TTTS/TAPS check.      # Pica  - Symptoms improving.  - Hgb 12.1, Ferritin 6, Vitamin B12/Folate WNL.     # Anxiety/Depression  - PHQ-9 on 2/10 score was 20. Declined interventions.  - History of  depression.   - Declined  psychology.     # Routine PNC  - Prenatal labs: Rh O positive, Ab screening negative, rubella Immune, Hep B SAg NR, HIV NR, RPR NR, GC/Chlam Neg/neg, Pap smear: NILM 10/2019                          Failed early GCT (130) 19. Passed 3 hr GTT on 19, and on 2020                          GBS, consider earlier in the setting of likely delivery at 32-34 weeks.   - Immunizations: TDap 3/9/2020- Genetics: NT measurement wnl for  all 3 fetuses (11/20/19). Declined further screening after EIF noted on Fetus 3.  - Prophylaxis: Aspirin 81 mg daily.     # Delivery planning  - CD at 34 weeks . Patients delivery would be on 4/20/2020, however she does not want this date and would like to do it on 4/21/2020.  Discussed that we can consider that, however she may need to be delivered earlier.     RTC in 1 week    Patient seen and evaluated with Dr. Teagan Villegas MD  [facility] Fellow   3/16/2020    Physician Attestation   I, Chris Candelaria MD, saw this patient and agree with the findings and plan of care as documented in the note.      Items personally reviewed/procedural attestation: Ultrasound, history and plan of care.    Chris Candelaria MD

## 2020-03-16 NOTE — NURSING NOTE
Sobeida, accompanied by her SO Mathew, seen in clinic today for TTTS/BPP/UAR and OB visit at 29w0d gestation for pregnancy complicated by di/tri triplets (see report/notes). VSS. Pt reports positive fetal movement. Pt denies bldg/lof/change in discharge/contractions/headache/vision changes/chest pain/SOB/edema. Pt reports she continues to desire the need to eat chalk, but declines following through. Dr. Lambert and Dr. Candelaria met with pt and discussed POC. Plan for 2x weekly monitoring with weekly OB visits. Writer will plan c/s around 34 weeks. Future visits scheduled at . Pt discharged stable and ambulatory.    Juana Kelly RN

## 2020-03-19 ENCOUNTER — TELEPHONE (OUTPATIENT)
Dept: MATERNAL FETAL MEDICINE | Facility: CLINIC | Age: 30
End: 2020-03-19

## 2020-03-19 ENCOUNTER — HOSPITAL ENCOUNTER (OUTPATIENT)
Dept: ULTRASOUND IMAGING | Facility: CLINIC | Age: 30
End: 2020-03-19
Attending: OBSTETRICS & GYNECOLOGY
Payer: COMMERCIAL

## 2020-03-19 ENCOUNTER — OFFICE VISIT (OUTPATIENT)
Dept: MATERNAL FETAL MEDICINE | Facility: CLINIC | Age: 30
End: 2020-03-19
Attending: OBSTETRICS & GYNECOLOGY
Payer: COMMERCIAL

## 2020-03-19 ENCOUNTER — PREP FOR PROCEDURE (OUTPATIENT)
Dept: MATERNAL FETAL MEDICINE | Facility: CLINIC | Age: 30
End: 2020-03-19

## 2020-03-19 DIAGNOSIS — O30.112 TRIPLET GESTATION WITH TWO OR MORE MONOCHORIONIC FETUSES IN SECOND TRIMESTER: ICD-10-CM

## 2020-03-19 DIAGNOSIS — O30.123: Primary | ICD-10-CM

## 2020-03-19 DIAGNOSIS — O30.113 TRIPLET GESTATION WITH TWO OR MORE MONOCHORIONIC FETUSES IN THIRD TRIMESTER: Primary | ICD-10-CM

## 2020-03-19 PROCEDURE — 76820 UMBILICAL ARTERY ECHO: CPT | Performed by: OBSTETRICS & GYNECOLOGY

## 2020-03-19 PROCEDURE — 76819 FETAL BIOPHYS PROFIL W/O NST: CPT

## 2020-03-19 NOTE — TELEPHONE ENCOUNTER
Spoke with Sobeida today to let her know we have her scheduled for c/s on 4/22 at 0830. Informed pt we will discuss details at a future visit. Pt VU.  Juana Kelly RN

## 2020-03-19 NOTE — PROGRESS NOTES
"Please see \"Imaging\" tab under \"Chart Review\" for details of today's US at the [facility].    Chris Candelaria MD  Maternal-Fetal Medicine      "

## 2020-03-20 ENCOUNTER — PREP FOR PROCEDURE (OUTPATIENT)
Dept: OBGYN | Facility: CLINIC | Age: 30
End: 2020-03-20

## 2020-03-20 PROBLEM — O30.123: Status: ACTIVE | Noted: 2020-02-27

## 2020-03-22 ENCOUNTER — TELEPHONE (OUTPATIENT)
Dept: MATERNAL FETAL MEDICINE | Facility: CLINIC | Age: 30
End: 2020-03-22

## 2020-03-22 NOTE — PROGRESS NOTES
Maternal fetal Medicine OB Follow up visit.      Sobeida Fountain  : 1990  MRN: [id number]     CC: OB Follow-up     Subjective:  Feeling well today.     She denies any contractions, leakage of fluid, vaginal bleeding, or other systemic symptoms. Confirms active fetal movement. She denies any headache, changes in vision, chest pain, shortness of breath, or other systemic symptoms.      Objective:  /79 (BP Location: Left arm, Patient Position: Chair)   Pulse 104   Resp 18   Wt 107.5 kg (237 lb)   LMP 2019   SpO2 95%   BMI 37.12 kg/m      Gen: NAD  Resp: Normal respiratory effort     OB Ultrasound:  See imaging tab for results from today's ultrasound     Assessment/Plan:  Sobeida Fountain is a 29 year old  female at 30w0d  by 14w0d US, here for follow OB visit.     Pregnancy has been complicated by:   # Dichorionic Triamniotic Triplets (Fetus 1 and 2 are monochorionic/diamniotic pair)   # Fetus 1 with severe fetal growth restriction, intermittently elevated UAR Dopplers, velamentous cord insertion.  - Patient previously counseled about maternal and pregnancy risks of triplet pregnancies.  - Fetal anatomy completed, EIF on fetus 3, declined testing.   - Fetal echo on 2020, without evidence of CHD on 3 fetuses.  ECHO to be performed within 24-48 hr.  - TTTS/TAPS check for mono-di pair -no sonographic signs today.  - Monitoring twice weekly UAR Doppler and TTTS/TAPS check.      # Pica  - Symptoms have improved but are persistent  - Hgb 12.1, Ferritin 6, Vitamin B12/Folate WNL.     # Anxiety/Depression  - Coping well.   - PHQ-9 on 2/10 score was 20. Declined interventions.  - History of  depression.   - Declined  psychology.     # Routine PNC  - Prenatal labs: Rh O positive, Ab screening negative, rubella Immune, Hep B SAg NR, HIV NR, RPR NR, GC/Chlam Neg/neg, Pap smear: NILM 10/2019                          Failed early GCT (130) 19. Passed 3 hr GTT  on 12/23/19, and on 2/26/2020                          GBS, consider earlier in the setting of likely delivery at 32-34 weeks.   - Immunizations: TDap 3/9/2020- Genetics: NT measurement wnl for all 3 fetuses (11/20/19). Declined further screening after EIF noted on Fetus 3.  - Prophylaxis: Aspirin 81 mg daily.     # Delivery planning  - CD at 34 weeks . Patients delivery would be on 4/20/2020, however she does not want this date and would like to do it before or after 4/20.  Discussed that we can consider that, however she may need to be delivered earlier. C/S scheduled 4/22 8:30 am      RTC in 1 week, continue twice weekly surveillance     Patient seen and evaluated with Dr. Teagan Villegas MD  M Fellow   3/23/2020  1:28 PM    Physician Attestation   I, Chris Candelaria MD, saw this patient and agree with the findings and plan of care as documented in the note.      Items personally reviewed/procedural attestation: History, ultrasound, VS, plan of care..    Chris Candelaria MD

## 2020-03-22 NOTE — TELEPHONE ENCOUNTER
Patient called [facility], and I returned call.     Headache started last night around 5, and she thinks is due to her longstanding allergies. She got some sleep last night, but her allergies did keep her up. Hasn't taken any medications because she is unsure what is safe in pregnancy. This morning she at something, and her headache improved. She denies chest pain, SOB, RUQ pain, contractions.  Normal fetal movement.    I recommended tylenol and benadryl prn for HA and allergies. Patient has follow-up appt tomorrow.    Gregoria Donato MD  Ob/Gyn PGY-3  03/22/20 12:30 PM

## 2020-03-23 ENCOUNTER — OFFICE VISIT (OUTPATIENT)
Dept: MATERNAL FETAL MEDICINE | Facility: CLINIC | Age: 30
End: 2020-03-23
Attending: OBSTETRICS & GYNECOLOGY
Payer: COMMERCIAL

## 2020-03-23 ENCOUNTER — HOSPITAL ENCOUNTER (OUTPATIENT)
Dept: ULTRASOUND IMAGING | Facility: CLINIC | Age: 30
End: 2020-03-23
Attending: OBSTETRICS & GYNECOLOGY
Payer: COMMERCIAL

## 2020-03-23 VITALS
OXYGEN SATURATION: 95 % | DIASTOLIC BLOOD PRESSURE: 79 MMHG | RESPIRATION RATE: 18 BRPM | HEART RATE: 104 BPM | WEIGHT: 237 LBS | SYSTOLIC BLOOD PRESSURE: 119 MMHG | BODY MASS INDEX: 37.12 KG/M2

## 2020-03-23 DIAGNOSIS — O30.112 TRIPLET GESTATION WITH TWO OR MORE MONOCHORIONIC FETUSES IN SECOND TRIMESTER: ICD-10-CM

## 2020-03-23 PROCEDURE — 76819 FETAL BIOPHYS PROFIL W/O NST: CPT | Mod: 59 | Performed by: OBSTETRICS & GYNECOLOGY

## 2020-03-23 PROCEDURE — G0463 HOSPITAL OUTPT CLINIC VISIT: HCPCS | Mod: 25,ZF

## 2020-03-23 PROCEDURE — 76816 OB US FOLLOW-UP PER FETUS: CPT

## 2020-03-23 NOTE — NURSING NOTE
"Sobeida seen in clinic today for follow up growth ultrasound and BPP at 30w0d gestation due to pregnancy c/b di/tri triplets (see report/notes). VSS. Pt reports + fetal movement x3. States having had a HA yesterday but it went away after drinking \"lots of water\" and placing a cold pack on her head. Pt denies bldg/lof/change in discharge/contractions/headache/vision changes/chest pain/SOB/edema. Dr. Lambert and Dr. Candelaria met with pt and discussed POC. Plan to continue BPP 2x/wk with weekly OB visits. Pt also expressed feeling guilt from hospital staff about breastfeeding or pumping. Pt expresses she does not want to breastfeed or pump. Pt discharged stable and ambulatory.         Angelika Lopez RN    "

## 2020-03-25 ENCOUNTER — TELEPHONE (OUTPATIENT)
Dept: MATERNAL FETAL MEDICINE | Facility: CLINIC | Age: 30
End: 2020-03-25

## 2020-03-25 DIAGNOSIS — O30.113 TRIPLET GESTATION WITH TWO OR MORE MONOCHORIONIC FETUSES IN THIRD TRIMESTER: Primary | ICD-10-CM

## 2020-03-25 DIAGNOSIS — O30.113 TRIPLET GESTATION WITH TWO OR MORE MONOCHORIONIC FETUSES IN THIRD TRIMESTER: ICD-10-CM

## 2020-03-25 LAB
ALBUMIN SERPL-MCNC: 2.3 G/DL (ref 3.4–5)
ALP SERPL-CCNC: 186 U/L (ref 40–150)
ALT SERPL W P-5'-P-CCNC: 80 U/L (ref 0–50)
AST SERPL W P-5'-P-CCNC: 37 U/L (ref 0–45)
BILIRUB DIRECT SERPL-MCNC: 0.2 MG/DL (ref 0–0.2)
BILIRUB SERPL-MCNC: 0.3 MG/DL (ref 0.2–1.3)
PROT SERPL-MCNC: 6.9 G/DL (ref 6.8–8.8)

## 2020-03-25 PROCEDURE — 82239 BILE ACIDS TOTAL: CPT | Performed by: OBSTETRICS & GYNECOLOGY

## 2020-03-25 PROCEDURE — 36415 COLL VENOUS BLD VENIPUNCTURE: CPT | Performed by: OBSTETRICS & GYNECOLOGY

## 2020-03-25 PROCEDURE — 80076 HEPATIC FUNCTION PANEL: CPT | Performed by: OBSTETRICS & GYNECOLOGY

## 2020-03-25 NOTE — TELEPHONE ENCOUNTER
Sobeida calling this morning with c/o itchy palms and soles and dark urine. Pt denies bldg/lof/change in discharge/contractions/headache/vision changes/chest pain/SOB/edema. Reports + fetal movement x3. Pt states googling her symptoms and is worried. Dr. Candelaria notified.  Labs ordered- bile acids, hepatic panel. Discussed plan of care with Sobeida. Pt is currently fasting and encouraged to not eat until after labs are drawn. Pt will go to [facility] outpatient lab this morning.    Angelika Lopez RN

## 2020-03-26 ENCOUNTER — TELEPHONE (OUTPATIENT)
Dept: MATERNAL FETAL MEDICINE | Facility: CLINIC | Age: 30
End: 2020-03-26

## 2020-03-26 ENCOUNTER — OFFICE VISIT (OUTPATIENT)
Dept: MATERNAL FETAL MEDICINE | Facility: CLINIC | Age: 30
End: 2020-03-26
Attending: OBSTETRICS & GYNECOLOGY
Payer: COMMERCIAL

## 2020-03-26 ENCOUNTER — HOSPITAL ENCOUNTER (OUTPATIENT)
Dept: ULTRASOUND IMAGING | Facility: CLINIC | Age: 30
End: 2020-03-26
Attending: OBSTETRICS & GYNECOLOGY
Payer: COMMERCIAL

## 2020-03-26 VITALS
HEART RATE: 114 BPM | OXYGEN SATURATION: 96 % | SYSTOLIC BLOOD PRESSURE: 113 MMHG | RESPIRATION RATE: 16 BRPM | DIASTOLIC BLOOD PRESSURE: 65 MMHG

## 2020-03-26 DIAGNOSIS — O30.112 TRIPLET GESTATION WITH TWO OR MORE MONOCHORIONIC FETUSES IN SECOND TRIMESTER: ICD-10-CM

## 2020-03-26 DIAGNOSIS — O26.643 INTRAHEPATIC CHOLESTASIS OF PREGNANCY IN THIRD TRIMESTER, ANTEPARTUM: ICD-10-CM

## 2020-03-26 DIAGNOSIS — O36.5921 INTRAUTERINE GROWTH RESTRICTION (IUGR) AFFECTING CARE OF MOTHER, SECOND TRIMESTER, FETUS 1: ICD-10-CM

## 2020-03-26 DIAGNOSIS — O30.113 TRIPLET GESTATION WITH TWO OR MORE MONOCHORIONIC FETUSES IN THIRD TRIMESTER: Primary | ICD-10-CM

## 2020-03-26 LAB — BILE AC SERPL-SCNC: 10 UMOL/L (ref 0–10)

## 2020-03-26 PROCEDURE — 76820 UMBILICAL ARTERY ECHO: CPT | Mod: 59 | Performed by: OBSTETRICS & GYNECOLOGY

## 2020-03-26 PROCEDURE — 76819 FETAL BIOPHYS PROFIL W/O NST: CPT | Mod: 59

## 2020-03-26 RX ORDER — URSODIOL 300 MG/1
300 CAPSULE ORAL 3 TIMES DAILY
Qty: 90 CAPSULE | Refills: 0 | Status: ON HOLD | OUTPATIENT
Start: 2020-03-26 | End: 2020-04-18

## 2020-03-26 ASSESSMENT — PAIN SCALES - GENERAL: PAINLEVEL: NO PAIN (0)

## 2020-03-26 NOTE — NURSING NOTE
Sobeida presents for BPP/UAR due to di/tri triplet gestation with FGR of fetus A. She called yesterday reporting itching. States itching much improved today. VSS. ALT elevated, bile acids pending, AST WNL, pt informed she will be notified of results when available and to keep Monday OBV to reassess plan per Dr. Baires.

## 2020-03-26 NOTE — TELEPHONE ENCOUNTER
TC to patient regarding bile acids of 10 in context of pruritis all over body noted earlier this week on Monday, reported better now.     She was seen for reassuring US today by Dr. Candelaria - see separate note - earlier today.     Discussed diagnosis of mild ICP with bile acids just at 10. Discussed recommendations for monitoring weekly (already doing twice weekly with triplets) and for earlier delivery at 36-37 weeks (already delivering at 34 weeks for triplets). Discussed starting TID ursodiol and safety and likely benefit for triplets and for her itching. Rx sent to [facility] [employer] on [address] in [place] - patient plans to have her fiance  tomorrow for her. Discussed importance of extreme social distancing given her high risk pregnancy. All questions answered. Has OB visit and US again on Monday.     Ok Baires MD

## 2020-03-26 NOTE — PROGRESS NOTES
"Please see \"Imaging\" tab under \"Chart Review\" for details of today's US at the [facility].    Chris Candelaria MD  Maternal-Fetal Medicine      "

## 2020-03-30 ENCOUNTER — OFFICE VISIT (OUTPATIENT)
Dept: MATERNAL FETAL MEDICINE | Facility: CLINIC | Age: 30
End: 2020-03-30
Attending: OBSTETRICS & GYNECOLOGY
Payer: COMMERCIAL

## 2020-03-30 ENCOUNTER — HOSPITAL ENCOUNTER (OUTPATIENT)
Dept: ULTRASOUND IMAGING | Facility: CLINIC | Age: 30
End: 2020-03-30
Attending: OBSTETRICS & GYNECOLOGY
Payer: COMMERCIAL

## 2020-03-30 VITALS
DIASTOLIC BLOOD PRESSURE: 67 MMHG | BODY MASS INDEX: 37.9 KG/M2 | SYSTOLIC BLOOD PRESSURE: 107 MMHG | HEART RATE: 107 BPM | WEIGHT: 242 LBS | RESPIRATION RATE: 18 BRPM | OXYGEN SATURATION: 98 %

## 2020-03-30 DIAGNOSIS — O30.112 TRIPLET GESTATION WITH TWO OR MORE MONOCHORIONIC FETUSES IN SECOND TRIMESTER: ICD-10-CM

## 2020-03-30 PROCEDURE — 76815 OB US LIMITED FETUS(S): CPT

## 2020-03-30 PROCEDURE — 76816 OB US FOLLOW-UP PER FETUS: CPT | Mod: 59

## 2020-03-30 PROCEDURE — G0463 HOSPITAL OUTPT CLINIC VISIT: HCPCS

## 2020-03-30 PROCEDURE — 76819 FETAL BIOPHYS PROFIL W/O NST: CPT | Performed by: OBSTETRICS & GYNECOLOGY

## 2020-03-30 ASSESSMENT — PAIN SCALES - GENERAL: PAINLEVEL: NO PAIN (0)

## 2020-03-30 NOTE — NURSING NOTE
Sobeida seen in clinic today for OB visit at 31w0d gestation for pregnancy complicated by di/tri triplets, F1-FGR & int elevated UAR, Cholestatsis, PICA (see report/notes). VSS. Pt reports positive fetal movement x3. Pt denies bldg/lof/change in discharge/contractions/headache/vision changes/chest pain/SOB/edema. Pt reports her itching has decreased and is feeling well. Marlon Lambert and Abhishek met with pt. Plan is for continued 2x weekly Ultrasounds with weekly OB visits. Will do repeat Bile Acids and LFT next week. Future visits previously scheduled. Pt discharged stable and ambulatory.    Juana Kelly RN

## 2020-03-30 NOTE — PROGRESS NOTES
Maternal fetal Medicine OB Follow up visit.      Sobeida Fountain  : 1990  MRN: [id number]     CC: OB Follow-up     Subjective:  Feeling well today. Itching has improved.      She denies any contractions, leakage of fluid, vaginal bleeding, or other systemic symptoms. Confirms active fetal movement. She denies any headache, changes in vision, chest pain, shortness of breath, or other systemic symptoms.      Objective:  /67 (BP Location: Left arm, Patient Position: Sitting, Cuff Size: Adult Large)   Pulse 107   Resp 18   Wt 109.8 kg (242 lb)   LMP 2019   SpO2 98%   BMI 37.90 kg/m       Gen: NAD  Resp: Normal respiratory effort     OB Ultrasound:  See imaging tab for results from today's ultrasound     Assessment/Plan:  Sobeida Fountain is a 29 year old  female at 31w0d  by 14w0d US, here for follow OB visit.     Pregnancy has been complicated by:   # Dichorionic Triamniotic Triplets (Fetus 1 and 2 are monochorionic/diamniotic pair)   # Fetus 1 with severe fetal growth restriction, intermittently elevated UAR Dopplers, velamentous cord insertion.  - Patient previously counseled about maternal and pregnancy risks of triplet pregnancies.  - Fetal anatomy completed, EIF on fetus 3, declined testing.   - Fetal echo on 2020, without evidence of CHD on 3 fetuses.  ECHO to be performed within 24-48 hr.  - TTTS/TAPS check for mono-di pair -no sonographic signs today.  - Monitoring twice weekly UAR Doppler and TTTS/TAPS check.      #Intrahepatic Cholestasis of pregnancy   -Bile acids 10, Mildly elevated ALT 80.   -On  ursodiol 300mg TID   -Plan to repeat labs next week.     # Pica  - Symptoms have improved but are persistent  - Hgb 12.1, Ferritin 6, Vitamin B12/Folate WNL.     # Anxiety/Depression  - Coping well.   - PHQ-9 on 2/10 score was 20. Declined interventions.  - History of  depression.   - Declined  psychology.     # Routine PNC  - Prenatal labs: Rh O  positive, Ab screening negative, rubella Immune, Hep B SAg NR, HIV NR, RPR NR, GC/Chlam Neg/neg, Pap smear: NILM 10/2019                          Failed early GCT (130) 12/18/19. Passed 3 hr GTT on 12/23/19, and on 2/26/2020                          GBS, consider earlier in the setting of likely delivery at 32-34 weeks.   - Immunizations: TDap 3/9/2020- Genetics: NT measurement wnl for all 3 fetuses (11/20/19). Declined further screening after EIF noted on Fetus 3.  - Prophylaxis: Aspirin 81 mg daily.     # Delivery planning  - CD at 34 weeks . Patients delivery would be on 4/20/2020, however she does not want this date and would like to do it before or after 4/20.  Discussed that we can consider that, however she may need to be delivered earlier. C/S scheduled 4/22 8:30 am      RTC in 1 week, continue twice weekly surveillance, plan to repeat Bile acids and AST/ALT next week.      Patient seen and evaluated with Dr. Abhishek Villegas MD  [facility] Fellow   3/30/2020 1:04 PM    Physician Attestation   I, Forrest Weiss, saw and evaluated Sobeida Fountain with the resident/fellow.      I have reviewed and discussed with Dr. Lambert the patient history, physical exam and plan of care. I personally reviewed the vital signs, medications, lab results and imaging.    Key history or physical exam findings: triplet pregnancy and IHCP    Key management decisions made: plan for delivery. Repeat bile acids in 2 weeks.    Forrest Weiss  Date of Service (when I saw the patient): 03/30/20    Time Spent on this Encounter   I, Forrest Weiss, spent a total of 10 minutes in face to face consultation today managing the care of Sobeida Fountain.  Over 50% of my time on the unit was spent counseling the patient and /or coordinating care regarding planning for delivery. See note for details.

## 2020-04-02 ENCOUNTER — OFFICE VISIT (OUTPATIENT)
Dept: MATERNAL FETAL MEDICINE | Facility: CLINIC | Age: 30
End: 2020-04-02
Attending: OBSTETRICS & GYNECOLOGY
Payer: COMMERCIAL

## 2020-04-02 ENCOUNTER — HOSPITAL ENCOUNTER (OUTPATIENT)
Dept: ULTRASOUND IMAGING | Facility: CLINIC | Age: 30
End: 2020-04-02
Attending: OBSTETRICS & GYNECOLOGY
Payer: COMMERCIAL

## 2020-04-02 DIAGNOSIS — O30.113 TRIPLET GESTATION WITH TWO OR MORE MONOCHORIONIC FETUSES IN THIRD TRIMESTER: Primary | ICD-10-CM

## 2020-04-02 DIAGNOSIS — O30.112 TRIPLET GESTATION WITH TWO OR MORE MONOCHORIONIC FETUSES IN SECOND TRIMESTER: ICD-10-CM

## 2020-04-02 PROCEDURE — 76819 FETAL BIOPHYS PROFIL W/O NST: CPT

## 2020-04-02 PROCEDURE — 76820 UMBILICAL ARTERY ECHO: CPT | Performed by: OBSTETRICS & GYNECOLOGY

## 2020-04-06 ENCOUNTER — HOSPITAL ENCOUNTER (OUTPATIENT)
Dept: ULTRASOUND IMAGING | Facility: CLINIC | Age: 30
End: 2020-04-06
Attending: OBSTETRICS & GYNECOLOGY
Payer: COMMERCIAL

## 2020-04-06 ENCOUNTER — OFFICE VISIT (OUTPATIENT)
Dept: MATERNAL FETAL MEDICINE | Facility: CLINIC | Age: 30
End: 2020-04-06
Attending: OBSTETRICS & GYNECOLOGY
Payer: COMMERCIAL

## 2020-04-06 VITALS
RESPIRATION RATE: 18 BRPM | DIASTOLIC BLOOD PRESSURE: 77 MMHG | HEART RATE: 103 BPM | WEIGHT: 244.4 LBS | SYSTOLIC BLOOD PRESSURE: 110 MMHG | OXYGEN SATURATION: 97 % | BODY MASS INDEX: 38.28 KG/M2

## 2020-04-06 DIAGNOSIS — O30.112 TRIPLET GESTATION WITH TWO OR MORE MONOCHORIONIC FETUSES IN SECOND TRIMESTER: ICD-10-CM

## 2020-04-06 DIAGNOSIS — O26.643 CHOLESTASIS DURING PREGNANCY IN THIRD TRIMESTER: Primary | ICD-10-CM

## 2020-04-06 LAB
ALBUMIN SERPL-MCNC: 2.1 G/DL (ref 3.4–5)
ALP SERPL-CCNC: 186 U/L (ref 40–150)
ALT SERPL W P-5'-P-CCNC: 30 U/L (ref 0–50)
AST SERPL W P-5'-P-CCNC: 18 U/L (ref 0–45)
BILIRUB DIRECT SERPL-MCNC: 0.2 MG/DL (ref 0–0.2)
BILIRUB SERPL-MCNC: 0.4 MG/DL (ref 0.2–1.3)
PROT SERPL-MCNC: 6.1 G/DL (ref 6.8–8.8)

## 2020-04-06 PROCEDURE — 87653 STREP B DNA AMP PROBE: CPT | Performed by: OBSTETRICS & GYNECOLOGY

## 2020-04-06 PROCEDURE — 76816 OB US FOLLOW-UP PER FETUS: CPT

## 2020-04-06 PROCEDURE — 87186 SC STD MICRODIL/AGAR DIL: CPT | Performed by: OBSTETRICS & GYNECOLOGY

## 2020-04-06 PROCEDURE — G0463 HOSPITAL OUTPT CLINIC VISIT: HCPCS

## 2020-04-06 PROCEDURE — 76815 OB US LIMITED FETUS(S): CPT

## 2020-04-06 PROCEDURE — 82239 BILE ACIDS TOTAL: CPT | Performed by: OBSTETRICS & GYNECOLOGY

## 2020-04-06 PROCEDURE — 76819 FETAL BIOPHYS PROFIL W/O NST: CPT | Performed by: OBSTETRICS & GYNECOLOGY

## 2020-04-06 PROCEDURE — 36415 COLL VENOUS BLD VENIPUNCTURE: CPT | Performed by: OBSTETRICS & GYNECOLOGY

## 2020-04-06 PROCEDURE — 80076 HEPATIC FUNCTION PANEL: CPT | Performed by: OBSTETRICS & GYNECOLOGY

## 2020-04-06 ASSESSMENT — PATIENT HEALTH QUESTIONNAIRE - PHQ9: SUM OF ALL RESPONSES TO PHQ QUESTIONS 1-9: 3

## 2020-04-06 ASSESSMENT — PAIN SCALES - GENERAL: PAINLEVEL: NO PAIN (0)

## 2020-04-06 NOTE — PROGRESS NOTES
Maternal fetal Medicine OB Follow up visit.      Sobeida Fountain  : 1990  MRN: [id number]     CC: OB Follow-up     Subjective:  Feeling well today. Itching has resolved     She denies any contractions, leakage of fluid, vaginal bleeding, or other systemic symptoms. Confirms active fetal movement.     She denies any headache, changes in vision, chest pain, shortness of breath, or other systemic symptoms.      Objective:  See ob flow sheet for vitals.   Gen: NAD  Resp: Normal respiratory effort  Abd: gravid, non tender     OB Ultrasound:  See imaging tab for results from today's ultrasound     Assessment/Plan:  Sobeida Fountain is a 29 year old  female at 32w0d  by 14w0d US, here for follow OB visit.     Pregnancy has been complicated by:     # Dichorionic Triamniotic Triplets (Fetus 1 and 2 are monochorionic/diamniotic pair)   # Fetus 1 with severe fetal growth restriction, intermittently elevated UAR Dopplers, velamentous cord insertion.  - Patient previously counseled about maternal and pregnancy risks of triplet pregnancies.  - Fetal anatomy completed, EIF on fetus 3, declined testing.   - Fetal echo on 2020, without evidence of CHD on 3 fetuses.  ECHO to be performed within 24-48 hr.  - TTTS/TAPS check for mono-di pair -no sonographic signs today.  - Monitoring twice weekly UAR Doppler and TTTS/TAPS check.      #Intrahepatic Cholestasis of pregnancy   -Bile acids 10, Mildly elevated ALT 80.   -On  ursodiol 300mg TID   -Labs repeated AST/ALT and bile acids today.      # Pica  - Symptoms have improved.  - Hgb 12.1, Ferritin 6, Vitamin B12/Folate WNL.     # Anxiety/Depression  - Coping well.   - PHQ-9 on 2/10 score was 20. Declined interventions.  - History of  depression.   - Declined  psychology.     # Routine PNC  - Prenatal labs: Rh O positive, Ab screening negative, rubella Immune, Hep B SAg NR, HIV NR, RPR NR, GC/Chlam Neg/neg, Pap smear: NILM 10/2019  Failed  early GCT (130) 12/18/19. Passed 3 hr GTT on 12/23/19, and on 2/26/2020  - Immunizations: TDap 3/9/2020- Genetics: NT measurement wnl for all 3 fetuses (11/20/19). Declined further screening after EIF noted on Fetus 3.  - Prophylaxis: Aspirin 81 mg daily.  -s/p BMZ @27 weeks, Plan to repeat 2nd course next week after growth US.   -GBS negative, but expires 4/10, Collected again today.     # Delivery planning  - CD at 34 weeks.  C/S scheduled 4/22 8:30 am     Plan:   RTC in 1 week  Growth US at next visit  Continue twice weekly surveillance  BMZ at next visit depending on ultrasound results     Patient seen and evaluated with Dr. Ebony Villegas MD  [facility] Fellow   4/6/2020  11:20 AM    FACULTY NOTE:  I agree with Dr. Lambert's assessment and plan.  I was present for physical exam and discussion with Ms. Fountain.   The patient was seen for an established outpatient visit.  I spent a total of 10 minutes face to face with Sobeida Fountain during today's visit. Over 50% of this time was spent counseling the patient and/or coordinating care triplet pregnancy.

## 2020-04-06 NOTE — NURSING NOTE
Sobeida seen in clinic today for RL2 and OB visit at 32w0d gestation for pregnancy complicated by di/tri triplets, F1- FGR with int elevated UAR, Cholestasis, PICA (see report/notes). VSS. Pt reports postive fetal movement x3. Pt denies bldg/lof/change in discharge/contractions/headache/vision changes/chest pain/SOB. Sobeida reports today that she is have swelling in her hands every morning but goes away after being awake for 1-2 hours. Marlon Alvarado and Ebony met with pt and discussed POC. Plan for repeat Bile Acids and hepatic panel today, GBS collected and sent to lab. Pt will return on Thursday as scheduled for BPP/UAR, has all follow up scheduled, c/s scheduled for 4/22. Pt discharged stable and ambulatory.    Juana Kelly RN

## 2020-04-07 LAB
BILE AC SERPL-SCNC: 12 UMOL/L (ref 0–10)
GP B STREP DNA SPEC QL NAA+PROBE: POSITIVE
SPECIMEN SOURCE: ABNORMAL

## 2020-04-09 ENCOUNTER — OFFICE VISIT (OUTPATIENT)
Dept: MATERNAL FETAL MEDICINE | Facility: CLINIC | Age: 30
End: 2020-04-09
Attending: OBSTETRICS & GYNECOLOGY
Payer: COMMERCIAL

## 2020-04-09 ENCOUNTER — HOSPITAL ENCOUNTER (OUTPATIENT)
Dept: ULTRASOUND IMAGING | Facility: CLINIC | Age: 30
End: 2020-04-09
Attending: OBSTETRICS & GYNECOLOGY
Payer: COMMERCIAL

## 2020-04-09 DIAGNOSIS — O30.112 TRIPLET GESTATION WITH TWO OR MORE MONOCHORIONIC FETUSES IN SECOND TRIMESTER: ICD-10-CM

## 2020-04-09 DIAGNOSIS — O26.643 CHOLESTASIS DURING PREGNANCY IN THIRD TRIMESTER: ICD-10-CM

## 2020-04-09 DIAGNOSIS — O30.113 TRIPLET GESTATION WITH TWO OR MORE MONOCHORIONIC FETUSES IN THIRD TRIMESTER: Primary | ICD-10-CM

## 2020-04-09 DIAGNOSIS — O36.5921 INTRAUTERINE GROWTH RESTRICTION (IUGR) AFFECTING CARE OF MOTHER, SECOND TRIMESTER, FETUS 1: ICD-10-CM

## 2020-04-09 PROCEDURE — 76819 FETAL BIOPHYS PROFIL W/O NST: CPT | Mod: 59

## 2020-04-09 PROCEDURE — 76820 UMBILICAL ARTERY ECHO: CPT | Performed by: OBSTETRICS & GYNECOLOGY

## 2020-04-09 NOTE — PROGRESS NOTES
"Please see \"Imaging\" tab under \"Chart Review\" for details of today's US at the [facility].    Chris Candelaria MD  Maternal-Fetal Medicine      "

## 2020-04-11 LAB
BACTERIA SPEC CULT: ABNORMAL
SPECIMEN SOURCE: ABNORMAL

## 2020-04-13 ENCOUNTER — HOSPITAL ENCOUNTER (OUTPATIENT)
Dept: ULTRASOUND IMAGING | Facility: CLINIC | Age: 30
End: 2020-04-13
Attending: OBSTETRICS & GYNECOLOGY
Payer: COMMERCIAL

## 2020-04-13 ENCOUNTER — OFFICE VISIT (OUTPATIENT)
Dept: MATERNAL FETAL MEDICINE | Facility: CLINIC | Age: 30
End: 2020-04-13
Attending: OBSTETRICS & GYNECOLOGY
Payer: COMMERCIAL

## 2020-04-13 VITALS
SYSTOLIC BLOOD PRESSURE: 112 MMHG | DIASTOLIC BLOOD PRESSURE: 54 MMHG | WEIGHT: 249 LBS | BODY MASS INDEX: 39 KG/M2 | HEART RATE: 98 BPM | OXYGEN SATURATION: 98 % | RESPIRATION RATE: 18 BRPM

## 2020-04-13 DIAGNOSIS — O30.112 TRIPLET GESTATION WITH TWO OR MORE MONOCHORIONIC FETUSES IN SECOND TRIMESTER: Primary | ICD-10-CM

## 2020-04-13 DIAGNOSIS — O30.112 TRIPLET GESTATION WITH TWO OR MORE MONOCHORIONIC FETUSES IN SECOND TRIMESTER: ICD-10-CM

## 2020-04-13 PROCEDURE — 76819 FETAL BIOPHYS PROFIL W/O NST: CPT | Performed by: OBSTETRICS & GYNECOLOGY

## 2020-04-13 PROCEDURE — G0463 HOSPITAL OUTPT CLINIC VISIT: HCPCS

## 2020-04-13 PROCEDURE — 76816 OB US FOLLOW-UP PER FETUS: CPT | Mod: 59

## 2020-04-13 RX ORDER — ACYCLOVIR 400 MG/1
400 TABLET ORAL 2 TIMES DAILY
Qty: 30 TABLET | Refills: 1 | Status: SHIPPED | OUTPATIENT
Start: 2020-04-13 | End: 2023-09-16

## 2020-04-13 ASSESSMENT — PAIN SCALES - GENERAL: PAINLEVEL: NO PAIN (0)

## 2020-04-13 NOTE — NURSING NOTE
Sobeida seen in clinic today for RL2 andOB visit at 33w0d gestation for pregnancy complicated by di/tri triplets, F1- FGR with int elevated UAR, Cholestasis, PICA (see report/notes). VSS. Pt reports positive fetal movement x3. Pt denies bldg/lof/change in discharge/contractions/headache/vision changes/chest pain/SOB. Sobeida states she started judy every 2-3 min when she was having her US, now seems to have subsided. Pt endorses she has swelling in her hands and left foot, that seems to get better as she moves around throughout the day. C/S ERAS information and soap given to pt. Reviewed where to check in and date and time for c/s. Dr. Lambert and Dr. Beck met with pt and discussed POC. Plan to have Beta on 4/16 and 4/17, BPP/UAR on 4/16 and final RL2/BPP/OB visit on 4/20 . Future visits previously scheduled. Pt discharged stable and ambulatory.

## 2020-04-13 NOTE — PROGRESS NOTES
Maternal fetal Medicine OB Follow up visit.      Sobedia Fountain  : 1990  MRN: [id number]     CC: OB Follow-up     Subjective:  Feeling well today. Itching has resolved     She denies any contractions, leakage of fluid, vaginal bleeding, or other systemic symptoms. Confirms active fetal movement. Needs refill for HSV prophylaxis.      She denies any headache, changes in vision, chest pain, shortness of breath, or other systemic symptoms.      Objective:  /54 (BP Location: Left arm, Patient Position: Sitting, Cuff Size: Adult Large)   Pulse 98   Resp 18   Wt 112.9 kg (249 lb)   LMP 2019   SpO2 98%   BMI 39.00 kg/m      Gen: NAD  Resp: Normal respiratory effort  Abd: gravid     OB Ultrasound:  See imaging tab for results from today's ultrasound, fetal growth today.      Assessment/Plan:  Sobeida Fountain is a 29 year old  female at 33w0d  by 14w0d US, here for follow OB visit.     Pregnancy has been complicated by:      # Dichorionic Triamniotic Triplets (Fetus 1 and 2 are monochorionic/diamniotic pair)   # Fetus 1 with fetal growth restriction, intermittently elevated UAR Dopplers, velamentous cord insertion.  - Fetal growth today is reassuring with growth in all babies. AC for F1 still at 3%ile. Intermittently elevated S/D ratio UAR Dopplers.   - Patient previously counseled about maternal and pregnancy risks of triplet pregnancies.  - Fetal anatomy completed, EIF on fetus 3, declined testing.   - Fetal echo on 2020, without evidence of CHD on 3 fetuses.  ECHO to be performed within 24-48 hr.  - TTTS/TAPS check for mono-di pair -no sonographic signs today.  - Monitoring twice weekly UAR Doppler and TTTS/TAPS check.      #Intrahepatic Cholestasis of pregnancy   -Bile acids 10> 12, Mildly elevated ALT, normalized.    -On  ursodiol 300mg TID   -Continue until delivery      # Pica  - Symptoms have improved.  - Hgb 12.1, Ferritin 6, Vitamin B12/Folate WNL.     #  Anxiety/Depression  - Coping well.   - PHQ-9 on 2/10 score was 20. Declined interventions.  - History of  depression.   - Declined  psychology.    #History of Genital HSV:   No symptoms   -On suppression with acyclovir.      # Routine PNC  - Prenatal labs: Rh O positive, Ab screening negative, rubella Immune, Hep B SAg NR, HIV NR, RPR NR, GC/Chlam Neg/neg, Pap smear: NILM 10/2019  Failed early GCT (130) 19. Passed 3 hr GTT on 19, and on 2020  - Immunizations: TDap 3/9/2020- Genetics: NT measurement wnl for all 3 fetuses (19). Declined further screening after EIF noted on Fetus 3.  - Prophylaxis: Aspirin 81 mg daily.  -s/p BMZ @27 weeks, Plan to repeat 2nd course -  -GBS Positive      # Delivery planning  - CD at 34  2/7weeks.  C/S scheduled  8:30 am      Plan:   BMZ rescue course -  RTC for OB Visit in 1 week  Continue twice weekly surveillance    Patient seen and evaluated with Dr. Ebony Villegas MD  M Fellow   2020  1:16 PM    FACULTY NOTE:  Sobeida is doing well.  Having contractions, intermittently.  Denies leaking or bleeding.  +FM x 3.  Continue twice weekly fetal surveillance until delivery.  The patient was seen for an established outpatient visit.  I spent a total of 10 minutes face to face with Sobeida Fountain during today's visit. Over 50% of this time was spent counseling the patient and/or coordinating care Di/Tri triplet pregnancies.    Leni Beck DO [facility]  Maternal Fetal Medicine Specialist  Pager: [phone]  Mobile: [phone]

## 2020-04-16 ENCOUNTER — OFFICE VISIT (OUTPATIENT)
Dept: MATERNAL FETAL MEDICINE | Facility: CLINIC | Age: 30
End: 2020-04-16
Attending: OBSTETRICS & GYNECOLOGY
Payer: COMMERCIAL

## 2020-04-16 ENCOUNTER — HOSPITAL ENCOUNTER (OUTPATIENT)
Dept: ULTRASOUND IMAGING | Facility: CLINIC | Age: 30
End: 2020-04-16
Attending: OBSTETRICS & GYNECOLOGY
Payer: COMMERCIAL

## 2020-04-16 DIAGNOSIS — O30.112 TRIPLET GESTATION WITH TWO OR MORE MONOCHORIONIC FETUSES IN SECOND TRIMESTER: ICD-10-CM

## 2020-04-16 PROCEDURE — 25000128 H RX IP 250 OP 636: Mod: ZF | Performed by: OBSTETRICS & GYNECOLOGY

## 2020-04-16 PROCEDURE — 76820 UMBILICAL ARTERY ECHO: CPT | Performed by: OBSTETRICS & GYNECOLOGY

## 2020-04-16 PROCEDURE — 76819 FETAL BIOPHYS PROFIL W/O NST: CPT | Mod: 59

## 2020-04-16 PROCEDURE — 96372 THER/PROPH/DIAG INJ SC/IM: CPT

## 2020-04-16 PROCEDURE — 90471 IMMUNIZATION ADMIN: CPT | Mod: ZF

## 2020-04-16 RX ORDER — BETAMETHASONE SODIUM PHOSPHATE AND BETAMETHASONE ACETATE 3; 3 MG/ML; MG/ML
12 INJECTION, SUSPENSION INTRA-ARTICULAR; INTRALESIONAL; INTRAMUSCULAR; SOFT TISSUE ONCE
Status: DISCONTINUED | OUTPATIENT
Start: 2020-04-16 | End: 2020-04-16

## 2020-04-16 RX ADMIN — BETAMETHASONE ACETATE AND BETAMETHASONE SODIUM PHOSPHATE 12 MG: 3; 3 INJECTION, SUSPENSION INTRA-ARTICULAR; INTRALESIONAL; INTRAMUSCULAR; SOFT TISSUE at 14:10

## 2020-04-16 NOTE — NURSING NOTE
Pt here for BPP/UAR and Betamethasone injection at 33w3d, pregnancy c/b di/tri triplets, PICA, Cholestasis (see reports/notes). Pt denies LOF/Bleeding/change in discharge/cramping or ctx/HA/vision changes/SOB/chest pains/edema. Pt endorsed positive fetal movement x3.   Clinic Administered Medication Documentation      Injectable Medication Documentation    Patient was given Betamethasone. Prior to medication administration, verified patients identity using patient s name and date of birth. Please see MAR and medication order for additional information. Patient instructed to remain in clinic for 15 minutes, report any adverse reaction to staff immediately  and stay in clinic after the injection but patient declined.      Was entire vial of medication used? No, The remainder 3ML of 5ML was discarded as unavoidable waste.  Vial/Syringe: Multi dose vial  Expiration Date:  12/24/2020  Was this medication supplied by the patient? No   See MAR for administration documentation.  Plan is for pt to return on 4/17 for 2nd dose of Betamethasone. Deliver is planned on 4/22/20.     Juana Kelly RN

## 2020-04-17 ENCOUNTER — ANESTHESIA (OUTPATIENT)
Dept: OBGYN | Facility: CLINIC | Age: 30
End: 2020-04-17
Payer: COMMERCIAL

## 2020-04-17 ENCOUNTER — ANESTHESIA EVENT (OUTPATIENT)
Dept: OBGYN | Facility: CLINIC | Age: 30
End: 2020-04-17
Payer: COMMERCIAL

## 2020-04-17 ENCOUNTER — HOSPITAL ENCOUNTER (INPATIENT)
Facility: CLINIC | Age: 30
LOS: 2 days | Discharge: HOME HEALTH | End: 2020-04-19
Attending: OBSTETRICS & GYNECOLOGY | Admitting: OBSTETRICS & GYNECOLOGY
Payer: COMMERCIAL

## 2020-04-17 DIAGNOSIS — Z98.891 S/P PRIMARY LOW TRANSVERSE C-SECTION: Primary | ICD-10-CM

## 2020-04-17 PROBLEM — Z36.89 ENCOUNTER FOR TRIAGE IN PREGNANT PATIENT: Status: ACTIVE | Noted: 2020-04-17

## 2020-04-17 PROBLEM — O42.919 PRETERM PREMATURE RUPTURE OF MEMBRANES (PPROM) WITH UNKNOWN ONSET OF LABOR: Status: ACTIVE | Noted: 2020-04-17

## 2020-04-17 LAB
ABO + RH BLD: NORMAL
ABO + RH BLD: NORMAL
ALBUMIN UR-MCNC: NEGATIVE MG/DL
AMPHETAMINES UR QL SCN: NEGATIVE
APPEARANCE UR: CLEAR
BACTERIA #/AREA URNS HPF: ABNORMAL /HPF
BASOPHILS # BLD AUTO: 0 10E9/L (ref 0–0.2)
BASOPHILS NFR BLD AUTO: 0.1 %
BILIRUB UR QL STRIP: NEGATIVE
BLD GP AB SCN SERPL QL: NORMAL
BLD PROD TYP BPU: NORMAL
BLOOD BANK CMNT PATIENT-IMP: NORMAL
CANNABINOIDS UR QL: NEGATIVE
COCAINE UR QL: NEGATIVE
COLOR UR AUTO: YELLOW
DIFFERENTIAL METHOD BLD: NORMAL
EOSINOPHIL # BLD AUTO: 0 10E9/L (ref 0–0.7)
EOSINOPHIL NFR BLD AUTO: 0 %
ERYTHROCYTE [DISTWIDTH] IN BLOOD BY AUTOMATED COUNT: 15 % (ref 10–15)
GLUCOSE UR STRIP-MCNC: NEGATIVE MG/DL
HCT VFR BLD AUTO: 37 % (ref 35–47)
HGB BLD-MCNC: 12.3 G/DL (ref 11.7–15.7)
HGB UR QL STRIP: ABNORMAL
IMM GRANULOCYTES # BLD: 0.1 10E9/L (ref 0–0.4)
IMM GRANULOCYTES NFR BLD: 1 %
KETONES UR STRIP-MCNC: 10 MG/DL
LEUKOCYTE ESTERASE UR QL STRIP: NEGATIVE
LYMPHOCYTES # BLD AUTO: 2.3 10E9/L (ref 0.8–5.3)
LYMPHOCYTES NFR BLD AUTO: 23.5 %
MCH RBC QN AUTO: 29.4 PG (ref 26.5–33)
MCHC RBC AUTO-ENTMCNC: 33.2 G/DL (ref 31.5–36.5)
MCV RBC AUTO: 89 FL (ref 78–100)
MONOCYTES # BLD AUTO: 0.5 10E9/L (ref 0–1.3)
MONOCYTES NFR BLD AUTO: 4.6 %
MUCOUS THREADS #/AREA URNS LPF: PRESENT /LPF
NEUTROPHILS # BLD AUTO: 7 10E9/L (ref 1.6–8.3)
NEUTROPHILS NFR BLD AUTO: 70.8 %
NITRATE UR QL: NEGATIVE
NRBC # BLD AUTO: 0 10*3/UL
NRBC BLD AUTO-RTO: 0 /100
NUM BPU REQUESTED: 2
OPIATES UR QL SCN: NEGATIVE
PCP UR QL SCN: NEGATIVE
PH UR STRIP: 6 PH (ref 5–7)
PLATELET # BLD AUTO: 207 10E9/L (ref 150–450)
RBC # BLD AUTO: 4.18 10E12/L (ref 3.8–5.2)
RBC #/AREA URNS AUTO: 22 /HPF (ref 0–2)
RUPTURE OF FETAL MEMBRANES BY ROM PLUS: POSITIVE
SOURCE: ABNORMAL
SP GR UR STRIP: 1.01 (ref 1–1.03)
SPECIMEN EXP DATE BLD: NORMAL
SQUAMOUS #/AREA URNS AUTO: 1 /HPF (ref 0–1)
T PALLIDUM AB SER QL: NONREACTIVE
UROBILINOGEN UR STRIP-MCNC: NORMAL MG/DL (ref 0–2)
WBC # BLD AUTO: 9.9 10E9/L (ref 4–11)
WBC #/AREA URNS AUTO: 4 /HPF (ref 0–5)

## 2020-04-17 PROCEDURE — 40000170 ZZH STATISTIC PRE-PROCEDURE ASSESSMENT II: Performed by: OBSTETRICS & GYNECOLOGY

## 2020-04-17 PROCEDURE — 40000010 ZZH STATISTIC ANES STAT CODE-CRNA PER MINUTE: Performed by: OBSTETRICS & GYNECOLOGY

## 2020-04-17 PROCEDURE — C9290 INJ, BUPIVACAINE LIPOSOME: HCPCS | Performed by: STUDENT IN AN ORGANIZED HEALTH CARE EDUCATION/TRAINING PROGRAM

## 2020-04-17 PROCEDURE — 36000057 ZZH SURGERY LEVEL 3 1ST 30 MIN - UMMC: Performed by: OBSTETRICS & GYNECOLOGY

## 2020-04-17 PROCEDURE — 84112 EVAL AMNIOTIC FLUID PROTEIN: CPT | Performed by: OBSTETRICS & GYNECOLOGY

## 2020-04-17 PROCEDURE — 36415 COLL VENOUS BLD VENIPUNCTURE: CPT | Performed by: STUDENT IN AN ORGANIZED HEALTH CARE EDUCATION/TRAINING PROGRAM

## 2020-04-17 PROCEDURE — 12000001 ZZH R&B MED SURG/OB UMMC

## 2020-04-17 PROCEDURE — 25000128 H RX IP 250 OP 636: Performed by: STUDENT IN AN ORGANIZED HEALTH CARE EDUCATION/TRAINING PROGRAM

## 2020-04-17 PROCEDURE — 25800030 ZZH RX IP 258 OP 636: Performed by: NURSE ANESTHETIST, CERTIFIED REGISTERED

## 2020-04-17 PROCEDURE — 36000059 ZZH SURGERY LEVEL 3 EA 15 ADDTL MIN UMMC: Performed by: OBSTETRICS & GYNECOLOGY

## 2020-04-17 PROCEDURE — 25000125 ZZHC RX 250: Performed by: NURSE ANESTHETIST, CERTIFIED REGISTERED

## 2020-04-17 PROCEDURE — 25000128 H RX IP 250 OP 636

## 2020-04-17 PROCEDURE — 88307 TISSUE EXAM BY PATHOLOGIST: CPT | Mod: 26 | Performed by: STUDENT IN AN ORGANIZED HEALTH CARE EDUCATION/TRAINING PROGRAM

## 2020-04-17 PROCEDURE — 86850 RBC ANTIBODY SCREEN: CPT | Performed by: STUDENT IN AN ORGANIZED HEALTH CARE EDUCATION/TRAINING PROGRAM

## 2020-04-17 PROCEDURE — 25000128 H RX IP 250 OP 636: Performed by: NURSE ANESTHETIST, CERTIFIED REGISTERED

## 2020-04-17 PROCEDURE — 71000014 ZZH RECOVERY PHASE 1 LEVEL 2 FIRST HR: Performed by: OBSTETRICS & GYNECOLOGY

## 2020-04-17 PROCEDURE — 25000128 H RX IP 250 OP 636: Performed by: OBSTETRICS & GYNECOLOGY

## 2020-04-17 PROCEDURE — 86923 COMPATIBILITY TEST ELECTRIC: CPT | Performed by: STUDENT IN AN ORGANIZED HEALTH CARE EDUCATION/TRAINING PROGRAM

## 2020-04-17 PROCEDURE — 86901 BLOOD TYPING SEROLOGIC RH(D): CPT | Performed by: STUDENT IN AN ORGANIZED HEALTH CARE EDUCATION/TRAINING PROGRAM

## 2020-04-17 PROCEDURE — 85025 COMPLETE CBC W/AUTO DIFF WBC: CPT | Performed by: STUDENT IN AN ORGANIZED HEALTH CARE EDUCATION/TRAINING PROGRAM

## 2020-04-17 PROCEDURE — 25000132 ZZH RX MED GY IP 250 OP 250 PS 637: Performed by: NURSE ANESTHETIST, CERTIFIED REGISTERED

## 2020-04-17 PROCEDURE — 25000132 ZZH RX MED GY IP 250 OP 250 PS 637: Performed by: STUDENT IN AN ORGANIZED HEALTH CARE EDUCATION/TRAINING PROGRAM

## 2020-04-17 PROCEDURE — 80307 DRUG TEST PRSMV CHEM ANLYZR: CPT | Performed by: OBSTETRICS & GYNECOLOGY

## 2020-04-17 PROCEDURE — 25800030 ZZH RX IP 258 OP 636

## 2020-04-17 PROCEDURE — 88307 TISSUE EXAM BY PATHOLOGIST: CPT | Performed by: STUDENT IN AN ORGANIZED HEALTH CARE EDUCATION/TRAINING PROGRAM

## 2020-04-17 PROCEDURE — 81001 URINALYSIS AUTO W/SCOPE: CPT | Performed by: OBSTETRICS & GYNECOLOGY

## 2020-04-17 PROCEDURE — 37000009 ZZH ANESTHESIA TECHNICAL FEE, EACH ADDTL 15 MIN: Performed by: OBSTETRICS & GYNECOLOGY

## 2020-04-17 PROCEDURE — 71000015 ZZH RECOVERY PHASE 1 LEVEL 2 EA ADDTL HR: Performed by: OBSTETRICS & GYNECOLOGY

## 2020-04-17 PROCEDURE — 86900 BLOOD TYPING SEROLOGIC ABO: CPT | Performed by: STUDENT IN AN ORGANIZED HEALTH CARE EDUCATION/TRAINING PROGRAM

## 2020-04-17 PROCEDURE — 86780 TREPONEMA PALLIDUM: CPT | Performed by: STUDENT IN AN ORGANIZED HEALTH CARE EDUCATION/TRAINING PROGRAM

## 2020-04-17 PROCEDURE — G0463 HOSPITAL OUTPT CLINIC VISIT: HCPCS

## 2020-04-17 PROCEDURE — 27210794 ZZH OR GENERAL SUPPLY STERILE: Performed by: OBSTETRICS & GYNECOLOGY

## 2020-04-17 PROCEDURE — 25000125 ZZHC RX 250: Performed by: STUDENT IN AN ORGANIZED HEALTH CARE EDUCATION/TRAINING PROGRAM

## 2020-04-17 PROCEDURE — 37000008 ZZH ANESTHESIA TECHNICAL FEE, 1ST 30 MIN: Performed by: OBSTETRICS & GYNECOLOGY

## 2020-04-17 PROCEDURE — 27110028 ZZH OR GENERAL SUPPLY NON-STERILE: Performed by: OBSTETRICS & GYNECOLOGY

## 2020-04-17 RX ORDER — HYDROCORTISONE 2.5 %
CREAM (GRAM) TOPICAL 3 TIMES DAILY PRN
Status: DISCONTINUED | OUTPATIENT
Start: 2020-04-17 | End: 2020-04-19 | Stop reason: HOSPADM

## 2020-04-17 RX ORDER — AMOXICILLIN 250 MG
2 CAPSULE ORAL 2 TIMES DAILY
Status: DISCONTINUED | OUTPATIENT
Start: 2020-04-17 | End: 2020-04-17

## 2020-04-17 RX ORDER — METHYLERGONOVINE MALEATE 0.2 MG/ML
200 INJECTION INTRAVENOUS
Status: DISCONTINUED | OUTPATIENT
Start: 2020-04-17 | End: 2020-04-19 | Stop reason: HOSPADM

## 2020-04-17 RX ORDER — SODIUM CHLORIDE, SODIUM LACTATE, POTASSIUM CHLORIDE, CALCIUM CHLORIDE 600; 310; 30; 20 MG/100ML; MG/100ML; MG/100ML; MG/100ML
INJECTION, SOLUTION INTRAVENOUS CONTINUOUS
Status: DISCONTINUED | OUTPATIENT
Start: 2020-04-17 | End: 2020-04-17 | Stop reason: HOSPADM

## 2020-04-17 RX ORDER — FENTANYL CITRATE 50 UG/ML
25-50 INJECTION, SOLUTION INTRAMUSCULAR; INTRAVENOUS
Status: DISCONTINUED | OUTPATIENT
Start: 2020-04-17 | End: 2020-04-17 | Stop reason: CLARIF

## 2020-04-17 RX ORDER — PRENATAL VIT/IRON FUM/FOLIC AC 27MG-0.8MG
1 TABLET ORAL DAILY
Status: DISCONTINUED | OUTPATIENT
Start: 2020-04-17 | End: 2020-04-17

## 2020-04-17 RX ORDER — ACYCLOVIR 400 MG/1
400 TABLET ORAL 2 TIMES DAILY
Status: DISCONTINUED | OUTPATIENT
Start: 2020-04-17 | End: 2020-04-17

## 2020-04-17 RX ORDER — PRENATAL VIT/IRON FUM/FOLIC AC 27MG-0.8MG
1 TABLET ORAL DAILY
Status: DISCONTINUED | OUTPATIENT
Start: 2020-04-18 | End: 2020-04-17

## 2020-04-17 RX ORDER — CITRIC ACID/SODIUM CITRATE 334-500MG
SOLUTION, ORAL ORAL
Status: DISCONTINUED
Start: 2020-04-17 | End: 2020-04-17 | Stop reason: HOSPADM

## 2020-04-17 RX ORDER — FLUMAZENIL 0.1 MG/ML
0.2 INJECTION, SOLUTION INTRAVENOUS
Status: DISCONTINUED | OUTPATIENT
Start: 2020-04-17 | End: 2020-04-17 | Stop reason: CLARIF

## 2020-04-17 RX ORDER — AMOXICILLIN 250 MG
2 CAPSULE ORAL 2 TIMES DAILY
Status: DISCONTINUED | OUTPATIENT
Start: 2020-04-17 | End: 2020-04-19 | Stop reason: HOSPADM

## 2020-04-17 RX ORDER — BUPIVACAINE HYDROCHLORIDE 2.5 MG/ML
INJECTION, SOLUTION EPIDURAL; INFILTRATION; INTRACAUDAL PRN
Status: DISCONTINUED | OUTPATIENT
Start: 2020-04-17 | End: 2020-04-17

## 2020-04-17 RX ORDER — LIDOCAINE 40 MG/G
CREAM TOPICAL
Status: DISCONTINUED | OUTPATIENT
Start: 2020-04-17 | End: 2020-04-17

## 2020-04-17 RX ORDER — ONDANSETRON 2 MG/ML
4 INJECTION INTRAMUSCULAR; INTRAVENOUS EVERY 6 HOURS PRN
Status: DISCONTINUED | OUTPATIENT
Start: 2020-04-17 | End: 2020-04-19 | Stop reason: HOSPADM

## 2020-04-17 RX ORDER — OXYTOCIN/0.9 % SODIUM CHLORIDE 30/500 ML
100 PLASTIC BAG, INJECTION (ML) INTRAVENOUS CONTINUOUS
Status: DISCONTINUED | OUTPATIENT
Start: 2020-04-17 | End: 2020-04-19 | Stop reason: HOSPADM

## 2020-04-17 RX ORDER — SIMETHICONE 80 MG
80 TABLET,CHEWABLE ORAL 4 TIMES DAILY PRN
Status: DISCONTINUED | OUTPATIENT
Start: 2020-04-17 | End: 2020-04-19 | Stop reason: HOSPADM

## 2020-04-17 RX ORDER — KETOROLAC TROMETHAMINE 30 MG/ML
INJECTION, SOLUTION INTRAMUSCULAR; INTRAVENOUS PRN
Status: DISCONTINUED | OUTPATIENT
Start: 2020-04-17 | End: 2020-04-17

## 2020-04-17 RX ORDER — LIDOCAINE 40 MG/G
CREAM TOPICAL
Status: DISCONTINUED | OUTPATIENT
Start: 2020-04-17 | End: 2020-04-19 | Stop reason: HOSPADM

## 2020-04-17 RX ORDER — KETOROLAC TROMETHAMINE 30 MG/ML
30 INJECTION, SOLUTION INTRAMUSCULAR; INTRAVENOUS EVERY 6 HOURS
Status: COMPLETED | OUTPATIENT
Start: 2020-04-17 | End: 2020-04-18

## 2020-04-17 RX ORDER — FENTANYL CITRATE 50 UG/ML
INJECTION, SOLUTION INTRAMUSCULAR; INTRAVENOUS
Status: DISCONTINUED
Start: 2020-04-17 | End: 2020-04-17 | Stop reason: HOSPADM

## 2020-04-17 RX ORDER — SODIUM CHLORIDE, SODIUM LACTATE, POTASSIUM CHLORIDE, CALCIUM CHLORIDE 600; 310; 30; 20 MG/100ML; MG/100ML; MG/100ML; MG/100ML
INJECTION, SOLUTION INTRAVENOUS CONTINUOUS
Status: DISCONTINUED | OUTPATIENT
Start: 2020-04-17 | End: 2020-04-17

## 2020-04-17 RX ORDER — DIPHENHYDRAMINE HYDROCHLORIDE 50 MG/ML
25 INJECTION INTRAMUSCULAR; INTRAVENOUS EVERY 6 HOURS PRN
Status: DISCONTINUED | OUTPATIENT
Start: 2020-04-17 | End: 2020-04-17

## 2020-04-17 RX ORDER — NALOXONE HYDROCHLORIDE 0.4 MG/ML
.1-.4 INJECTION, SOLUTION INTRAMUSCULAR; INTRAVENOUS; SUBCUTANEOUS
Status: DISCONTINUED | OUTPATIENT
Start: 2020-04-17 | End: 2020-04-17

## 2020-04-17 RX ORDER — PENICILLIN G POTASSIUM 5000000 [IU]/1
5 INJECTION, POWDER, FOR SOLUTION INTRAMUSCULAR; INTRAVENOUS ONCE
Status: COMPLETED | OUTPATIENT
Start: 2020-04-17 | End: 2020-04-17

## 2020-04-17 RX ORDER — METOCLOPRAMIDE HYDROCHLORIDE 5 MG/ML
10 INJECTION INTRAMUSCULAR; INTRAVENOUS EVERY 6 HOURS PRN
Status: DISCONTINUED | OUTPATIENT
Start: 2020-04-17 | End: 2020-04-17

## 2020-04-17 RX ORDER — DIPHENHYDRAMINE HCL 25 MG
25 CAPSULE ORAL EVERY 6 HOURS PRN
Status: DISCONTINUED | OUTPATIENT
Start: 2020-04-17 | End: 2020-04-17

## 2020-04-17 RX ORDER — AMOXICILLIN 250 MG
1 CAPSULE ORAL 2 TIMES DAILY
Status: DISCONTINUED | OUTPATIENT
Start: 2020-04-17 | End: 2020-04-17

## 2020-04-17 RX ORDER — OXYCODONE HYDROCHLORIDE 5 MG/1
5 TABLET ORAL EVERY 4 HOURS PRN
Status: DISCONTINUED | OUTPATIENT
Start: 2020-04-17 | End: 2020-04-18

## 2020-04-17 RX ORDER — CARBOPROST TROMETHAMINE 250 UG/ML
INJECTION, SOLUTION INTRAMUSCULAR
Status: DISCONTINUED
Start: 2020-04-17 | End: 2020-04-17 | Stop reason: HOSPADM

## 2020-04-17 RX ORDER — NALOXONE HYDROCHLORIDE 0.4 MG/ML
.1-.4 INJECTION, SOLUTION INTRAMUSCULAR; INTRAVENOUS; SUBCUTANEOUS
Status: DISCONTINUED | OUTPATIENT
Start: 2020-04-17 | End: 2020-04-19 | Stop reason: HOSPADM

## 2020-04-17 RX ORDER — PHENYLEPHRINE HCL IN 0.9% NACL 1 MG/10 ML
SYRINGE (ML) INTRAVENOUS CONTINUOUS PRN
Status: DISCONTINUED | OUTPATIENT
Start: 2020-04-17 | End: 2020-04-17

## 2020-04-17 RX ORDER — NALBUPHINE HYDROCHLORIDE 20 MG/ML
2.5-5 INJECTION, SOLUTION INTRAMUSCULAR; INTRAVENOUS; SUBCUTANEOUS EVERY 6 HOURS PRN
Status: DISCONTINUED | OUTPATIENT
Start: 2020-04-17 | End: 2020-04-17

## 2020-04-17 RX ORDER — DIPHENHYDRAMINE HCL 25 MG
25 CAPSULE ORAL EVERY 6 HOURS PRN
Status: DISCONTINUED | OUTPATIENT
Start: 2020-04-17 | End: 2020-04-19 | Stop reason: HOSPADM

## 2020-04-17 RX ORDER — BETAMETHASONE SODIUM PHOSPHATE AND BETAMETHASONE ACETATE 3; 3 MG/ML; MG/ML
12 INJECTION, SUSPENSION INTRA-ARTICULAR; INTRALESIONAL; INTRAMUSCULAR; SOFT TISSUE ONCE
Status: DISCONTINUED | OUTPATIENT
Start: 2020-04-17 | End: 2020-04-17

## 2020-04-17 RX ORDER — ONDANSETRON 2 MG/ML
4 INJECTION INTRAMUSCULAR; INTRAVENOUS EVERY 6 HOURS PRN
Status: DISCONTINUED | OUTPATIENT
Start: 2020-04-17 | End: 2020-04-17

## 2020-04-17 RX ORDER — FENTANYL CITRATE 50 UG/ML
INJECTION, SOLUTION INTRAMUSCULAR; INTRAVENOUS PRN
Status: DISCONTINUED | OUTPATIENT
Start: 2020-04-17 | End: 2020-04-17

## 2020-04-17 RX ORDER — MODIFIED LANOLIN
OINTMENT (GRAM) TOPICAL
Status: DISCONTINUED | OUTPATIENT
Start: 2020-04-17 | End: 2020-04-19 | Stop reason: HOSPADM

## 2020-04-17 RX ORDER — CEFAZOLIN SODIUM 1 G/3ML
INJECTION, POWDER, FOR SOLUTION INTRAMUSCULAR; INTRAVENOUS PRN
Status: DISCONTINUED | OUTPATIENT
Start: 2020-04-17 | End: 2020-04-17

## 2020-04-17 RX ORDER — CARBOPROST TROMETHAMINE 250 UG/ML
250 INJECTION, SOLUTION INTRAMUSCULAR
Status: DISCONTINUED | OUTPATIENT
Start: 2020-04-17 | End: 2020-04-19 | Stop reason: HOSPADM

## 2020-04-17 RX ORDER — SODIUM CHLORIDE, SODIUM LACTATE, POTASSIUM CHLORIDE, CALCIUM CHLORIDE 600; 310; 30; 20 MG/100ML; MG/100ML; MG/100ML; MG/100ML
INJECTION, SOLUTION INTRAVENOUS
Status: COMPLETED
Start: 2020-04-17 | End: 2020-04-17

## 2020-04-17 RX ORDER — OXYTOCIN/0.9 % SODIUM CHLORIDE 30/500 ML
PLASTIC BAG, INJECTION (ML) INTRAVENOUS
Status: DISCONTINUED
Start: 2020-04-17 | End: 2020-04-17 | Stop reason: HOSPADM

## 2020-04-17 RX ORDER — NALOXONE HYDROCHLORIDE 0.4 MG/ML
.1-.4 INJECTION, SOLUTION INTRAMUSCULAR; INTRAVENOUS; SUBCUTANEOUS
Status: ACTIVE | OUTPATIENT
Start: 2020-04-17 | End: 2020-04-18

## 2020-04-17 RX ORDER — EPHEDRINE SULFATE 50 MG/ML
5 INJECTION, SOLUTION INTRAMUSCULAR; INTRAVENOUS; SUBCUTANEOUS
Status: DISCONTINUED | OUTPATIENT
Start: 2020-04-17 | End: 2020-04-17

## 2020-04-17 RX ORDER — AMPICILLIN 2 G/1
2 INJECTION, POWDER, FOR SOLUTION INTRAVENOUS EVERY 6 HOURS
Status: DISCONTINUED | OUTPATIENT
Start: 2020-04-17 | End: 2020-04-17

## 2020-04-17 RX ORDER — FENTANYL CITRATE 50 UG/ML
50 INJECTION, SOLUTION INTRAMUSCULAR; INTRAVENOUS ONCE
Status: DISCONTINUED | OUTPATIENT
Start: 2020-04-17 | End: 2020-04-17

## 2020-04-17 RX ORDER — OXYTOCIN 10 [USP'U]/ML
10 INJECTION, SOLUTION INTRAMUSCULAR; INTRAVENOUS
Status: DISCONTINUED | OUTPATIENT
Start: 2020-04-17 | End: 2020-04-19 | Stop reason: HOSPADM

## 2020-04-17 RX ORDER — NALOXONE HYDROCHLORIDE 0.4 MG/ML
.1-.4 INJECTION, SOLUTION INTRAMUSCULAR; INTRAVENOUS; SUBCUTANEOUS
Status: DISCONTINUED | OUTPATIENT
Start: 2020-04-17 | End: 2020-04-17 | Stop reason: CLARIF

## 2020-04-17 RX ORDER — MORPHINE SULFATE 1 MG/ML
INJECTION, SOLUTION EPIDURAL; INTRATHECAL; INTRAVENOUS PRN
Status: DISCONTINUED | OUTPATIENT
Start: 2020-04-17 | End: 2020-04-17

## 2020-04-17 RX ORDER — IBUPROFEN 800 MG/1
800 TABLET, FILM COATED ORAL EVERY 6 HOURS
Status: DISCONTINUED | OUTPATIENT
Start: 2020-04-18 | End: 2020-04-19 | Stop reason: HOSPADM

## 2020-04-17 RX ORDER — TRANEXAMIC ACID 100 MG/ML
INJECTION, SOLUTION INTRAVENOUS
Status: DISCONTINUED
Start: 2020-04-17 | End: 2020-04-17 | Stop reason: HOSPADM

## 2020-04-17 RX ORDER — DEXTROSE, SODIUM CHLORIDE, SODIUM LACTATE, POTASSIUM CHLORIDE, AND CALCIUM CHLORIDE 5; .6; .31; .03; .02 G/100ML; G/100ML; G/100ML; G/100ML; G/100ML
INJECTION, SOLUTION INTRAVENOUS CONTINUOUS
Status: DISCONTINUED | OUTPATIENT
Start: 2020-04-17 | End: 2020-04-19 | Stop reason: HOSPADM

## 2020-04-17 RX ORDER — AMOXICILLIN 250 MG
1 CAPSULE ORAL 2 TIMES DAILY
Status: DISCONTINUED | OUTPATIENT
Start: 2020-04-17 | End: 2020-04-19 | Stop reason: HOSPADM

## 2020-04-17 RX ORDER — AMOXICILLIN 250 MG/1
250 CAPSULE ORAL 3 TIMES DAILY
Status: DISCONTINUED | OUTPATIENT
Start: 2020-04-19 | End: 2020-04-17

## 2020-04-17 RX ORDER — AZITHROMYCIN 250 MG/1
1000 TABLET, FILM COATED ORAL ONCE
Status: DISCONTINUED | OUTPATIENT
Start: 2020-04-17 | End: 2020-04-17

## 2020-04-17 RX ORDER — PROCHLORPERAZINE 25 MG
25 SUPPOSITORY, RECTAL RECTAL EVERY 12 HOURS PRN
Status: DISCONTINUED | OUTPATIENT
Start: 2020-04-17 | End: 2020-04-19 | Stop reason: HOSPADM

## 2020-04-17 RX ORDER — METOCLOPRAMIDE HYDROCHLORIDE 5 MG/ML
10 INJECTION INTRAMUSCULAR; INTRAVENOUS EVERY 6 HOURS PRN
Status: DISCONTINUED | OUTPATIENT
Start: 2020-04-17 | End: 2020-04-19 | Stop reason: HOSPADM

## 2020-04-17 RX ORDER — OXYTOCIN/0.9 % SODIUM CHLORIDE 30/500 ML
340 PLASTIC BAG, INJECTION (ML) INTRAVENOUS CONTINUOUS PRN
Status: DISCONTINUED | OUTPATIENT
Start: 2020-04-17 | End: 2020-04-19 | Stop reason: HOSPADM

## 2020-04-17 RX ORDER — OXYTOCIN/0.9 % SODIUM CHLORIDE 30/500 ML
PLASTIC BAG, INJECTION (ML) INTRAVENOUS CONTINUOUS PRN
Status: DISCONTINUED | OUTPATIENT
Start: 2020-04-17 | End: 2020-04-17

## 2020-04-17 RX ORDER — ACETAMINOPHEN 325 MG/1
975 TABLET ORAL EVERY 6 HOURS
Status: DISCONTINUED | OUTPATIENT
Start: 2020-04-17 | End: 2020-04-19 | Stop reason: HOSPADM

## 2020-04-17 RX ORDER — ONDANSETRON 4 MG/1
4 TABLET, ORALLY DISINTEGRATING ORAL EVERY 30 MIN PRN
Status: DISCONTINUED | OUTPATIENT
Start: 2020-04-17 | End: 2020-04-17 | Stop reason: HOSPADM

## 2020-04-17 RX ORDER — MISOPROSTOL 200 UG/1
TABLET ORAL PRN
Status: DISCONTINUED | OUTPATIENT
Start: 2020-04-17 | End: 2020-04-17

## 2020-04-17 RX ORDER — DIPHENHYDRAMINE HYDROCHLORIDE 50 MG/ML
25 INJECTION INTRAMUSCULAR; INTRAVENOUS EVERY 6 HOURS PRN
Status: DISCONTINUED | OUTPATIENT
Start: 2020-04-17 | End: 2020-04-19 | Stop reason: HOSPADM

## 2020-04-17 RX ORDER — MISOPROSTOL 200 UG/1
400 TABLET ORAL
Status: DISCONTINUED | OUTPATIENT
Start: 2020-04-17 | End: 2020-04-19 | Stop reason: HOSPADM

## 2020-04-17 RX ORDER — MISOPROSTOL 200 UG/1
TABLET ORAL
Status: DISCONTINUED
Start: 2020-04-17 | End: 2020-04-17 | Stop reason: HOSPADM

## 2020-04-17 RX ORDER — TERBUTALINE SULFATE 1 MG/ML
0.25 INJECTION, SOLUTION SUBCUTANEOUS ONCE
Status: DISCONTINUED | OUTPATIENT
Start: 2020-04-17 | End: 2020-04-17

## 2020-04-17 RX ORDER — BISACODYL 10 MG
10 SUPPOSITORY, RECTAL RECTAL DAILY PRN
Status: DISCONTINUED | OUTPATIENT
Start: 2020-04-19 | End: 2020-04-19 | Stop reason: HOSPADM

## 2020-04-17 RX ORDER — ACETAMINOPHEN 325 MG/1
975 TABLET ORAL EVERY 6 HOURS PRN
Status: DISCONTINUED | OUTPATIENT
Start: 2020-04-17 | End: 2020-04-17

## 2020-04-17 RX ORDER — PROCHLORPERAZINE 25 MG
25 SUPPOSITORY, RECTAL RECTAL EVERY 12 HOURS PRN
Status: DISCONTINUED | OUTPATIENT
Start: 2020-04-17 | End: 2020-04-17

## 2020-04-17 RX ORDER — METHYLERGONOVINE MALEATE 0.2 MG/ML
INJECTION INTRAVENOUS
Status: DISCONTINUED
Start: 2020-04-17 | End: 2020-04-17 | Stop reason: HOSPADM

## 2020-04-17 RX ORDER — ONDANSETRON 2 MG/ML
4 INJECTION INTRAMUSCULAR; INTRAVENOUS EVERY 30 MIN PRN
Status: DISCONTINUED | OUTPATIENT
Start: 2020-04-17 | End: 2020-04-17 | Stop reason: HOSPADM

## 2020-04-17 RX ORDER — TERBUTALINE SULFATE 1 MG/ML
INJECTION, SOLUTION SUBCUTANEOUS
Status: COMPLETED
Start: 2020-04-17 | End: 2020-04-17

## 2020-04-17 RX ORDER — SODIUM CHLORIDE, SODIUM LACTATE, POTASSIUM CHLORIDE, CALCIUM CHLORIDE 600; 310; 30; 20 MG/100ML; MG/100ML; MG/100ML; MG/100ML
INJECTION, SOLUTION INTRAVENOUS CONTINUOUS PRN
Status: DISCONTINUED | OUTPATIENT
Start: 2020-04-17 | End: 2020-04-17

## 2020-04-17 RX ORDER — FENTANYL CITRATE 50 UG/ML
25-50 INJECTION, SOLUTION INTRAMUSCULAR; INTRAVENOUS
Status: DISCONTINUED | OUTPATIENT
Start: 2020-04-17 | End: 2020-04-17 | Stop reason: HOSPADM

## 2020-04-17 RX ADMIN — BUPIVACAINE 20 ML: 13.3 INJECTION, SUSPENSION, LIPOSOMAL INFILTRATION at 11:10

## 2020-04-17 RX ADMIN — TERBUTALINE SULFATE 0.25 MG: 1 INJECTION, SOLUTION SUBCUTANEOUS at 08:32

## 2020-04-17 RX ADMIN — ACETAMINOPHEN 975 MG: 325 TABLET ORAL at 11:25

## 2020-04-17 RX ADMIN — MISOPROSTOL 400 MCG: 200 TABLET ORAL at 10:07

## 2020-04-17 RX ADMIN — SODIUM CHLORIDE, SODIUM LACTATE, POTASSIUM CHLORIDE, CALCIUM CHLORIDE AND DEXTROSE MONOHYDRATE: 5; 600; 310; 30; 20 INJECTION, SOLUTION INTRAVENOUS at 19:00

## 2020-04-17 RX ADMIN — KETOROLAC TROMETHAMINE 30 MG: 30 INJECTION, SOLUTION INTRAMUSCULAR at 16:41

## 2020-04-17 RX ADMIN — Medication 50 MCG/MIN: at 09:34

## 2020-04-17 RX ADMIN — CEFAZOLIN 2 G: 1 INJECTION, POWDER, FOR SOLUTION INTRAMUSCULAR; INTRAVENOUS at 09:45

## 2020-04-17 RX ADMIN — MORPHINE SULFATE 150 MCG: 1 INJECTION, SOLUTION EPIDURAL; INTRATHECAL; INTRAVENOUS at 09:32

## 2020-04-17 RX ADMIN — PENICILLIN G POTASSIUM 5 MILLION UNITS: 5000000 POWDER, FOR SOLUTION INTRAMUSCULAR; INTRAPLEURAL; INTRATHECAL; INTRAVENOUS at 09:06

## 2020-04-17 RX ADMIN — FENTANYL CITRATE 10 MCG: 50 INJECTION, SOLUTION INTRAMUSCULAR; INTRAVENOUS at 09:32

## 2020-04-17 RX ADMIN — PROCHLORPERAZINE EDISYLATE 10 MG: 5 INJECTION INTRAMUSCULAR; INTRAVENOUS at 11:52

## 2020-04-17 RX ADMIN — ONDANSETRON 4 MG: 2 INJECTION INTRAMUSCULAR; INTRAVENOUS at 09:57

## 2020-04-17 RX ADMIN — Medication 100 ML/HR: at 14:01

## 2020-04-17 RX ADMIN — SODIUM CHLORIDE, POTASSIUM CHLORIDE, SODIUM LACTATE AND CALCIUM CHLORIDE: 600; 310; 30; 20 INJECTION, SOLUTION INTRAVENOUS at 08:46

## 2020-04-17 RX ADMIN — ACETAMINOPHEN 975 MG: 325 TABLET ORAL at 23:27

## 2020-04-17 RX ADMIN — BUPIVACAINE HYDROCHLORIDE 20 ML: 2.5 INJECTION, SOLUTION EPIDURAL; INFILTRATION; INTRACAUDAL at 11:10

## 2020-04-17 RX ADMIN — KETOROLAC TROMETHAMINE 30 MG: 30 INJECTION, SOLUTION INTRAMUSCULAR at 10:29

## 2020-04-17 RX ADMIN — SODIUM CHLORIDE, POTASSIUM CHLORIDE, SODIUM LACTATE AND CALCIUM CHLORIDE: 600; 310; 30; 20 INJECTION, SOLUTION INTRAVENOUS at 09:54

## 2020-04-17 RX ADMIN — SODIUM CHLORIDE, SODIUM LACTATE, POTASSIUM CHLORIDE, CALCIUM CHLORIDE: 600; 310; 30; 20 INJECTION, SOLUTION INTRAVENOUS at 08:46

## 2020-04-17 RX ADMIN — OXYTOCIN-SODIUM CHLORIDE 0.9% IV SOLN 30 UNIT/500ML 300 ML/HR: 30-0.9/5 SOLUTION at 09:52

## 2020-04-17 RX ADMIN — KETOROLAC TROMETHAMINE 30 MG: 30 INJECTION, SOLUTION INTRAMUSCULAR at 23:27

## 2020-04-17 RX ADMIN — SODIUM CHLORIDE, POTASSIUM CHLORIDE, SODIUM LACTATE AND CALCIUM CHLORIDE: 600; 310; 30; 20 INJECTION, SOLUTION INTRAVENOUS at 09:26

## 2020-04-17 RX ADMIN — ACETAMINOPHEN 975 MG: 325 TABLET ORAL at 17:51

## 2020-04-17 ASSESSMENT — ACTIVITIES OF DAILY LIVING (ADL)
RETIRED_COMMUNICATION: 0-->UNDERSTANDS/COMMUNICATES WITHOUT DIFFICULTY
TOILETING: 0-->INDEPENDENT
FALL_HISTORY_WITHIN_LAST_SIX_MONTHS: NO
DRESS: 0-->INDEPENDENT
TRANSFERRING: 0-->INDEPENDENT
COGNITION: 0 - NO COGNITION ISSUES REPORTED
AMBULATION: 0-->INDEPENDENT
RETIRED_EATING: 0-->INDEPENDENT
SWALLOWING: 0-->SWALLOWS FOODS/LIQUIDS WITHOUT DIFFICULTY
BATHING: 0-->INDEPENDENT

## 2020-04-17 ASSESSMENT — MIFFLIN-ST. JEOR: SCORE: 1901.62

## 2020-04-17 NOTE — OP NOTE
[facility]  Operative Note    Sobeida Fountain    1990   MRN [id number]  Date of Service: 2020   Surgeon: Olinda Packer MD  Assistants: Apoorva Valdez MD PGY4     Pre-operative diagnosis:   IUP at 33w4d   Triplet Gestation, di/tri   labor with ruptured membranes    Post-operative diagnosis:   Postpartum s/p primary  section     Procedures: primary low transverse  section with double layer uterine closure via Pfannenstiel incision     Anesthesia: Spinal with duramorph  IVF: 1000ml  UOP: 100ml  QBL: 990ml  Intraoperative medications: 2g ancef, 500 mg azithromycin, IV pitocin, misoprostol    Indications: Sobeida Fountain is an 29 year old  at 33w4d with triplet gestation who presented with grossly ruptured membranes.  Evaluation was consistent with rapidly progressive  labor. The risks, benefits and alternatives were reviewed and the patient signed informed consent for the procedure. All questions were answered.     Findings:  Fetus A: cephalic, vigorous,  male, Apgars 8/9.  Fetus 2: cephalic, vigorous,  male, Apgars 8/8.  Fetus 3: viable transverse  female, Apgars 4/9/9.   Normal appearing uterus, fallopian tubes, ovaries. No nuchal cords.  Fetus 1 and 2 with clear amniotic fluid, fetus 3 with meconium stained amniotic fluid.  Intact placenta, 3V cord. No fascial adhesions.  No intraabdominal adhesions.    Specimen: placentas, cord gases, cord blood    Complications: None apparent    Procedure Details: The patient was taken back to the operating room where she underwent spinal anesthesia. SCDs and skelton catheter placed. She was prepped and draped in the usual sterile fashion in the dorsal supine position with a leftward tilt. A safety time out was performed. 2 g Ancef and 500 mg azithromycin were administered. After testing to ensure adequate anesthesia, a Pfannenstiel skin incision was made with the  scalpel and carried through the underlying layers to the fascia. The fascia was incised in the midline and extended laterally with Cui scissors. The superior aspect of the fascial incision was grasped with kocher clamps, elevated and the underlying rectus muscles dissected off with a combination of blunt and sharp dissection. Attention was then turned to the inferior aspect of the incision, which in a similar fashion was grasped, tented up and the rectus muscle dissected off the fascia. The rectus muscles were  in the midline. The peritoneum was identified and entered bluntly. This was extended with cautery dissection. The bladder blade was inserted.  The lower uterine segment was incised in a transverse fashion with the scalpel. The incision was extended with digital pressure in cranial and caudal directions. The bladder blade was removed. Fetus 1 was cephalic with head low in the pelvis and was delivered via reverse breech maneuvers.  Fetus 2 was cephalic and was delivered atraumatically. Fetus 3 was transverse. With internal version, the head was brought down to hysterotomy and she was delivered from a cephalic position. The cords was allowed to pulse for up to 1 minute then clamped and cut and was handed off to the waiting NICU staff for triplets 1 and 2.  Immediate cord clamping was done for triplet 3 due to appearing depressed at birth.  A segment of cord was taken for cord blood. The placentas were removed with gentle traction on the cord and fundal massage.     Oxytocin was given through running IV.  The uterus was exteriorized and cleared of all clots and debris.  Initial atony was treated with sublingual misoprostol. The uterine incision was repaired with 0- Vicryl in a running locked fashion. A second layer of 0-Monocryl was used to vertically imbricate the incision. A figure-of-eight suture was used to control bleeding from a denuded area just below the hysterotomy on the left.  The uterus was  noted to be firm.   The posterior cul-de-sac was cleared of clots and debris.  The uterus was then returned to the abdomen and noted to be hemostatic. The pericolic gutters were suctioned and cleared of clots. . A kocher clamp was used to elevate the fascia superiorly and inferiorly and areas of oozing were controlled with cautery until good hemostasis was noted. The fascia was closed with 0-vicryl in a running fashion. The subcutaneous tissue was irrigated and areas of bleeding were controlled with cautery. The subcutaneous tissue was closed with 3-0 Vicryl. The skin was closed with 4-0 Monocryl. Steristrips, ABD and sterile bandage placed.The patient tolerated the procedure well and was taken to the recovery room in stable condition. All lap, instrument, and sharps counts were correct times two. Dr. Packer was scrubbed and present for the procedure.     Apoorva Valdez MD   PGY-4 OB/GYN  4/17/2020 11:08 AM    Staff:  I was scrubbed and present for entire case and agree w/ above note.    Olinda Packer MD

## 2020-04-17 NOTE — ANESTHESIA CARE TRANSFER NOTE
Patient: Sobeida Fountain    Procedure(s):   SECTION    Diagnosis: * No pre-op diagnosis entered *  Diagnosis Additional Information: No value filed.    Anesthesia Type:   No value filed.     Note:  Airway :Room Air  Patient transferred to:PACU  Handoff Report: Identifed the Patient, Identified the Reponsible Provider, Reviewed the pertinent medical history, Discussed the surgical course, Reviewed Intra-OP anesthesia mangement and issues during anesthesia, Set expectations for post-procedure period and Allowed opportunity for questions and acknowledgement of understanding      Vitals: (Last set prior to Anesthesia Care Transfer)    CRNA VITALS  2020 1011 - 2020 1042      2020             Resp Rate (observed):  (!) 7                Electronically Signed By: ERIC Carmona CRNA  2020  10:42 AM

## 2020-04-17 NOTE — PLAN OF CARE
Data: Patient presented to [facility] at 0800.   Reason for maternal/fetal assessment per patient is Rule Out Labor  .  Patient is a . Prenatal record reviewed.      OB History    Para Term  AB Living   3 1 1 0 1 1   SAB TAB Ectopic Multiple Live Births   1 0 0 0 1      # Outcome Date GA Lbr Terrence/2nd Weight Sex Delivery Anes PTL Lv   3 Current            2 Term  39w0d       KALPANA   1 SAB            . Medical history:   Past Medical History:   Diagnosis Date    Depressive disorder     both during and after   . Gestational Age 33w4d with triplets. VSS. Fetal movement present. Patient denies  backache, vaginal discharge, pelvic pressure, UTI symptoms, GI problems, bloody show, vaginal bleeding, edema, headache, visual disturbances, epigastric or URQ pain, abdominal pain. Support persons are  present, her partner Mathew.  Action: Verbal consent for EFM x3. Triage assessment completed. EFM applied for NST x 3. Uterine assessment reveal contractions q 2-3 minutes. Fetal assessment: Presumed adequate fetal oxygenation documented (see flow record) x3.   Response: Dr. Madrid informed of arrival and patients discomfort. Plan per provider is admit for c/s. Patient verbalized agreement with plan.

## 2020-04-17 NOTE — ANESTHESIA POSTPROCEDURE EVALUATION
Anesthesia POST Procedure Evaluation    Patient: Sobeida Fountain   MRN:     [id number] Gender:   female   Age:    29 year old :      1990        Preoperative Diagnosis: * No pre-op diagnosis entered *   Procedure(s):   SECTION   Postop Comments: No value filed.     Anesthesia Type: No value filed.          Postop Pain Control: Uneventful            Sign Out: Well controlled pain   PONV: No   Neuro/Psych: Uneventful            Sign Out: Acceptable/Baseline neuro status   Airway/Respiratory: Uneventful            Sign Out: Acceptable/Baseline resp. status   CV/Hemodynamics: Uneventful            Sign Out: Acceptable CV status   Other NRE: NONE   DID A NON-ROUTINE EVENT OCCUR? No         Last Anesthesia Record Vitals:  CRNA VITALS  2020 1011 - 2020 1111      2020             Resp Rate (observed):  (!) 7          Last PACU Vitals:  Vitals Value Taken Time   /87 2020 10:45 AM   Temp 36.6  C (97.8  F) 2020 10:45 AM   Pulse 100 2020 10:45 AM   Resp     SpO2 100 % 2020 10:45 AM   Temp src     NIBP     Pulse     SpO2     Resp     Temp     Ht Rate     Temp 2           Electronically Signed By: Fernando Oreilly MD, 2020, 11:17 AM

## 2020-04-17 NOTE — ANESTHESIA PREPROCEDURE EVALUATION
"Anesthesia Pre-Procedure Evaluation    Patient: Sobeida Fountain   MRN:     [id number] Gender:   female   Age:    29 year old :      1990        Preoperative Diagnosis: * No pre-op diagnosis entered *   Procedure(s):   SECTION     LABS:  CBC:   Lab Results   Component Value Date    WBC 9.9 2020    WBC 8.5 2020    HGB 12.3 2020    HGB 11.3 (L) 2020    HCT 37.0 2020    HCT 33.8 (L) 2020     2020     2020     BMP: No results found for: NA, POTASSIUM, CHLORIDE, CO2, BUN, CR, GLC  COAGS: No results found for: PTT, INR, FIBR  POC:   Lab Results   Component Value Date    BGM 78 2020     OTHER:   Lab Results   Component Value Date    ALBUMIN 2.1 (L) 2020    PROTTOTAL 6.1 (L) 2020    ALT 30 2020    AST 18 2020    ALKPHOS 186 (H) 2020    BILITOTAL 0.4 2020        Preop Vitals    BP Readings from Last 3 Encounters:   20 106/87   20 112/54   20 110/77    Pulse Readings from Last 3 Encounters:   20 100   20 98   20 103      Resp Readings from Last 3 Encounters:   20 20   20 18   20 18    SpO2 Readings from Last 3 Encounters:   20 100%   20 98%   20 97%      Temp Readings from Last 1 Encounters:   20 36.6  C (97.8  F) (Oral)    Ht Readings from Last 1 Encounters:   20 1.702 m (5' 7\")      Wt Readings from Last 1 Encounters:   20 113.4 kg (250 lb)    Estimated body mass index is 39.16 kg/m  as calculated from the following:    Height as of this encounter: 1.702 m (5' 7\").    Weight as of this encounter: 113.4 kg (250 lb).     LDA:  Peripheral IV Anterior;Left Hand (Active)   Site Assessment WDL 04/17/20 1045   Line Status Infusing 20 1045   Phlebitis Scale 0-->no symptoms 20 1045   Number of days:        Urethral Catheter Non-latex 14 fr (Active)   Number of days: 0        Past Medical History:   Diagnosis " Date     Depressive disorder     both during and after      No past surgical history on file.   Not on File     Anesthesia Evaluation     . Pt has had prior anesthetic. Type: General           ROS/MED HX    ENT/Pulmonary:  - neg pulmonary ROS     Neurologic:  - neg neurologic ROS     Cardiovascular:  - neg cardiovascular ROS       METS/Exercise Tolerance:  >4 METS   Hematologic:  - neg hematologic  ROS       Musculoskeletal:  - neg musculoskeletal ROS      (-) muscular dystrophy   GI/Hepatic:  - neg GI/hepatic ROS       Renal/Genitourinary:  - ROS Renal section negative       Endo:  - neg endo ROS       Psychiatric:  - neg psychiatric ROS       Infectious Disease:  - neg infectious disease ROS       Malignancy:      - no malignancy   Other:    - neg other ROS                     PHYSICAL EXAM:   Mental Status/Neuro: A/A/O   Airway: Facies: Feasible  Mallampati: I  Mouth/Opening: Full  TM distance: > 6 cm  Neck ROM: Full   Respiratory: Auscultation: CTAB     Resp. Rate: Normal     Resp. Effort: Normal      CV: Rhythm: Regular  Rate: Age appropriate  Heart: Normal Sounds  Edema: None   Comments:      Dental: Normal Dentition                Assessment:   ASA SCORE: 2 emergent   H&P: History and physical reviewed and following examination; no interval change.    NPO Status: ELEVATED Aspiration Risk/Unknown     Plan:   Anes. Type:  Spinal; MAC   Pre-Medication: None   Induction:  N/a   Airway: Native Airway   Access/Monitoring: PIV   Maintenance: N/a     Postop Plan:   Postop Pain: Regional  Postop Sedation/Airway: Not planned     PONV Management: Adult Risk Factors: Female                   Fernando Oreilly MD

## 2020-04-17 NOTE — BRIEF OP NOTE
[facility], [place]    Brief Operative Note    Pre-operative diagnosis: Di/tri triplet gestation,  labor with ruptured membranes  Post-operative diagnosis Same as pre-operative diagnosis    Procedure: Procedure(s):   SECTION  Surgeon: Surgeon(s) and Role:     * Olinda Packer MD - Primary     * Apoorva Valdez MD - Resident - Assisting  Anesthesia: Spinal   Quantitative blood loss: 990mL  Urine 100ml  IVF 1000ml  Drains: Nino  Specimens:   ID Type Source Tests Collected by Time Destination   1 :  Placenta Placenta SEE PROVIDERS ORDERS Olinda Packer MD 2020 10:16 AM    2 : baby A Blood, arterial Blood SEE PROVIDERS ORDERS Olinda Packer MD 2020 10:16 AM    3 : Baby A Blood, venous Blood SEE PROVIDERS ORDERS Olinda Packer MD 2020 10:17 AM    4 : baby B Blood, arterial Blood SEE PROVIDERS ORDERS Olinda Packer MD 2020 10:17 AM    5 : Baby B Blood, venous Blood SEE PROVIDERS ORDERS Olinda Packer MD 2020 10:18 AM    6 : Baby C Blood, arterial Blood SEE PROVIDERS ORDERS Olinda Packer MD 2020 10:18 AM    7 : Baby C Blood, venous Blood SEE PROVIDERS ORDERS Olinda Packer MD 2020 10:19 AM    8 : Baby A Cord blood Blood OR DOCUMENTATION ONLY Olinda Packer MD 2020 10:19 AM    9 : Baby B Cord blood Blood OR DOCUMENTATION ONLY Olinda Packer MD 2020 10:27 AM    10 : Baby C Cord blood Blood OR DOCUMENTATION ONLY Olinda Packer MD 2020 10:28 AM      Findings: Fetus A: cephalic, vigorous,  male.  Fetus 2: cephalic, vigorous,  male.  Fetus 3: viable transverse  female.   Normal appearing uterus, fallopian tubes, ovaries. No nuchal cords.  Fetus 1 and 2 with clear amniotic fluid, fetus 3 with meconium stained amniotic fluid.  Intact placenta, 3V cord. No fascial adhesions.  No intraabdominal  adhesions.  Complications: none  Implants: * No implants in log *     Apoorva Valdez MD  PGY-4 OB/GYN  [phone] (OB G3 Pager)

## 2020-04-17 NOTE — ANESTHESIA PROCEDURE NOTES
Peripheral Nerve Block Procedure Note  Staff -     Resident/Fellow: Claude Morfin MD    Performed By: resident        Location: Pre-op  Procedure Start/Stop TImes:      4/17/2020 11:00 AM     4/17/2020 11:10 AM    patient identified, IV checked, site marked, risks and benefits discussed, informed consent, monitors and equipment checked, pre-op evaluation, at physician/surgeon's request and post-op pain management      Correct Patient: Yes      Correct Position: Yes      Correct Site: Yes      Correct Procedure: Yes      Correct Laterality:  Yes    Site Marked:  Yes  Procedure details:     Procedure:  TAP    Diagnosis:  Post operative pain    Laterality:  Bilateral    Position:  Supine    Sterile Prep: chloraprep, mask and sterile gloves      Local skin infiltration:  None    Needle:  Short bevel    Needle gauge:  21    Needle length (mm):  110    Ultrasound: Yes      Ultrasound used to identify targeted nerve, plexus, or vascular structure and placed a needle adjacent to it      Permanent Image entered into patiient's record      Abnormal pain on injection: No      Blood Aspirated: No      Paresthesias:  No    Bleeding at site: No      Bolus via:  Needle    Infusion Method:  Single Shot    Complications:  None  Assessment/Narrative:     Injection made incrementally with aspirations every (mL):  5

## 2020-04-17 NOTE — PLAN OF CARE
Patient came in laboring and SROM with triplets today. As labor was progressing a c/s was performed for the twin boys and one girl. Consents were signed and witnessed before surgery. Patient got a TAPS block for pain in recovery room. She had shaking and nausea initially but no c/o of pain. Her lochia has been scant, ff 1/unit(s). Continue to monitor closely.

## 2020-04-17 NOTE — PLAN OF CARE
Data: Sobeida Fountain transferred to 71 via cart at 1300 after a brief visit to her triplets in the NICU.   Action: Receiving unit notified of transfer: Yes. Patient and family notified of room change. Report given to Courtney BETTS RN at 1230. Belongings sent to receiving unit. Accompanied by Registered Nurse. Oriented patient to surroundings. Call light within reach.   Response: Patient tolerated transfer and is stable.

## 2020-04-17 NOTE — PLAN OF CARE
Pt admitted to rm 7129 @ 1300. Was able to visit triplets in NICU prior to transfer. Pt very tired, able to sleep between cares/vitals this afternoon. Pt denies pain. Pt's nausea has resolved, tolerating PO fluids now. Significant other here & supportive. Unit/room orientation started, will complete when pt more awake.

## 2020-04-17 NOTE — H&P
Antepartum History and Physical   2020  Sobeida Fountain  [id number]    HPI: Sobeida Fountain is a 29 year old  with dichorionic triamniotic triplet gestation at 33w4d [id number] US who presents with contractions and leakage of fluid. Sobeida reports that she has been having mild cramping overnight with pink spotting. This am at around 7 am she leakage of a large amount of clear fluid. Since arrival Sobeida reports that she is having increasingly painful contractions occurring every 2-3 minutes.     ROS: No headaches, vision changes, nausea, vomiting, fevers, chills, chest pain, SOB, constipation, diarrhea, dysuria, changes in vaginal discharge or edema in extremities noted.     Her pregnancy has been complicated by:  -Dichorionic triamniotic triplet gestation (fetus 1 and 2 monochorionic pair)  -Fetus 1 with FGR with intermittent increased resistance in the UA Doppler  -Velamentous cord for fetus 1  -Obesity  -Cholestasis of pregnancy  -Genital HSV    Obstetric History:   OB History    Para Term  AB Living   3 1 1 0 1 1   SAB TAB Ectopic Multiple Live Births   1 0 0 0 1      # Outcome Date GA Lbr Terrence/2nd Weight Sex Delivery Anes PTL Lv   3 Current            2 Term  39w0d       KALPANA   1 SAB              Past Medical History:   Past Medical History:   Diagnosis Date     Depressive disorder     both during and after     Past Surgical History:   No past surgical history on file.    Medications:   No current facility-administered medications on file prior to encounter.   acyclovir (ZOVIRAX) 400 MG tablet, Take 1 tablet (400 mg) by mouth 2 times daily  aspirin 81 MG EC tablet, Take 81 mg by mouth daily  calcium carbonate (TUMS) 500 MG chewable tablet, Take 1 chew tab by mouth 2 times daily  ferrous sulfate (FEROSUL) 325 (65 Fe) MG tablet, Take 325 mg by mouth daily (with breakfast)  omeprazole 20 MG tablet, Take 20 mg by mouth daily  Prenatal Vit-Fe Fumarate-FA (PRENATAL  VITAMIN PO), Take 2 tablets by mouth daily  ursodiol (ACTIGALL) 300 MG capsule, Take 1 capsule (300 mg) by mouth 3 times daily      Allergies:  No Known Allergies    FamilyHX:  Family History   Family history unknown: Yes       SocialHX:   Social History     Socioeconomic History     Marital status: Single     Spouse name: Not on file     Number of children: Not on file     Years of education: Not on file     Highest education level: Not on file   Occupational History     Not on file   Social Needs     Financial resource strain: Not on file     Food insecurity     Worry: Not on file     Inability: Not on file     Transportation needs     Medical: Not on file     Non-medical: Not on file   Tobacco Use     Smoking status: Never Smoker     Smokeless tobacco: Never Used   Substance and Sexual Activity     Alcohol use: Not Currently     Drug use: Not Currently     Sexual activity: Yes     Partners: Male     Birth control/protection: None   Lifestyle     Physical activity     Days per week: Not on file     Minutes per session: Not on file     Stress: Not on file   Relationships     Social connections     Talks on phone: Not on file     Gets together: Not on file     Attends Voodoo service: Not on file     Active member of club or organization: Not on file     Attends meetings of clubs or organizations: Not on file     Relationship status: Not on file     Intimate partner violence     Fear of current or ex partner: Not on file     Emotionally abused: Not on file     Physically abused: Not on file     Forced sexual activity: Not on file   Other Topics Concern     Not on file   Social History Narrative     Not on file     ROS: 10-point ROS negative except as indicated in HPI.    Physical Exam:  LMP 08/26/2019   General: alert, oriented female, resting in bed in NAD  CV: regular rate and rhythm, normal s1 and s2, no murmurs  Lungs: clear bilaterally, no crackles or wheezes  Extremities: bilateral lower extremities  non-tender without edema  Pelvic: speculum exam deferred due to evidence of gross rupture and patient discomfort  SVE: 2cm/80/-2, posterior    FHT A: baseline 145, moderate variability, +accelerations, no decelerations  FHT B: baseline 150, moderate variability, +accelerations, no decelerations  FHT C: baseline 155, moderate variability, +accelerations, no decelerations  [place]: contractions q2min    Prenatal ultrasounds:  METHOD  ---------------------------------------------------------------------------------------------------------  Transabdominal ultrasound examination. View: Sufficient        PREGNANCY  ---------------------------------------------------------------------------------------------------------  Triplet pregnancy. Dichorionic Triamniotic, fetuses 1 & 2 are a monochorionic pair. Number of fetuses: 3        DATING  ---------------------------------------------------------------------------------------------------------                                           Date                                Details                                                                                      Gest. age                      DANIELE  LMP                                  8/26/2019                                                                                                                         33 w + 0 d                     6/1/2020  Prior assessment               10/28/2019                       GA: 8 w + 4 d                                                                            32 w + 4 d                     6/4/2020  U/S Fetus 1                       4/13/2020                         based upon AC, BPD, Femur, HC                                                31 w + 2 d                     6/13/2020  U/S Fetus 2                                                              based upon AC, BPD, Femur, HC                                                31 w + 6 d                     6/9/2020  U/S Fetus 3                                                               based upon AC, BPD, Femur, HC                                                31 w + 1 d                     6/14/2020  Assigned dating                  Dating performed on 03/23/2020, based on the LMP                                                            33 w + 0 d                     6/1/2020        Fetus 1: GENERAL EVALUATION  ---------------------------------------------------------------------------------------------------------  Cardiac activity present.  bpm.  Fetal movements present.  Presentation cephalic, presenting, midline.  Placenta anterior, thin dividing membrane.  Umbilical cord 3 vessel cord.  Amniotic fluid Amount of AF: normal. MVP 3.6 cm.        Fetus 2: GENERAL EVALUATION  ---------------------------------------------------------------------------------------------------------  Cardiac activity present.  bpm.  Fetal movements present.  Presentation cephalic, maternal right .  Placenta anterior, thin dividing membrane.  Umbilical cord 3 vessel cord.  Amniotic fluid Amount of AF: normal. MVP 2.8 cm.        Fetus 3: GENERAL EVALUATION  ---------------------------------------------------------------------------------------------------------  Cardiac activity present.  bpm.  Fetal movements present.  Presentation cephalic, midline/left, superior .  Placenta anterior, thick dividing membrane.  Umbilical cord 3 vessel cord.  Amniotic fluid Amount of AF: normal. MVP 4.6 cm.        Fetus 1: FETAL BIOMETRY  ---------------------------------------------------------------------------------------------------------  Main Fetal Biometry:  BPD                                        80.5                    mm                         32w 2d                Hadlock  OFD                                        101.4                  mm                         29w 6d                 Bre  HC                                           287.4                  mm                          31w 4d                Hadlock  AC                                          264.9                  mm                          30w 4d                Hadlock  Femur                                      59.3                   mm                          30w 6d                Hadlock  Fetal Weight Calculation:  EFW                                       1,668                  g                                     17%         Lobito  EFW (lb,oz)                             3 lb 11                 oz  EFW by                                        Hadlock (BPD-HC-AC-FL)        Fetus 2: FETAL BIOMETRY  ---------------------------------------------------------------------------------------------------------  Main Fetal Biometry:  BPD                                        75.3                    mm                         30w 1d                Hadlock  OFD                                        105.9                  mm                         31w 3d                 Nicolaides  HC                                          289.3                  mm                          31w 6d                Hadlock  Cerebellum tr                            41.9                   mm                          35w 6d                Nicolaides  AC                                          285.7                  mm                          32w 4d                Hadlock  Femur                                      63.0                   mm                          32w 4d                Hadlock  Fetal Weight Calculation:  EFW                                       1,944                  g                                     34%         Lobito  EFW (lb,oz)                             4 lb 5                  oz  EFW by                                        Hadlock (BPD-HC-AC-FL)  Head / Face / Neck Biometry:                                             3.9                     mm  CM                                           5.8                     mm        Fetus 3: FETAL BIOMETRY  ---------------------------------------------------------------------------------------------------------  Main Fetal Biometry:  BPD                                        73.2                    mm                         29w 3d                Hadlock  OFD                                        106.6                  mm                         31w 6d                 Nicolaides  HC                                          289.5                  mm                          31w 6d                Hadlock  AC                                          270.5                  mm                          31w 1d                Hadlock  Femur                                      62.4                   mm                          32w 2d                Hadlock  Fetal Weight Calculation:  EFW                                       1,765                  g                                     22%         Lobito  EFW (lb,oz)                             3 lb 14                 oz  EFW by                                        Hadlock (BPD-HC-AC-FL)  Head / Face / Neck Biometry:                                             2.6                     mm        Fetus 1: FETAL ANATOMY  ---------------------------------------------------------------------------------------------------------  The following structures appear normal:  Head / Neck                         Cranium. Head size. Head shape. Thalami.  Face                                   Profile. Nose.  Heart / Thorax                      4-chamber view. RVOT view. LVOT view. 3-vessel-trachea view.                                             Diaphragm.  Abdomen                             Stomach. Kidneys. Bladder.     The following structures were documented previously:  Head / Neck                         Lateral ventricles. Midline falx. Cavum septi pellucidi. Cisterna magna.  Face                                    Lips.  Spine                                  Cervical spine. Thoracic spine. Lumbar spine. Sacral spine.     Gender: male.        Fetus 2: FETAL ANATOMY  ---------------------------------------------------------------------------------------------------------  The following structures appear normal:  Head / Neck                         Cranium. Head size. Head shape. Lateral ventricles. Midline falx. Cavum septi pellucidi. Cisterna magna.  Heart / Thorax                      4-chamber view. RVOT view. LVOT view. 3-vessel-trachea view.                                             Diaphragm.  Abdomen                             Stomach. Kidneys. Bladder.  Spine                                  Cervical spine. Thoracic spine. Lumbar spine. Sacral spine.     The following structures were documented previously:  Face                                   Lips. Profile. Nose.     Gender: male.        Fetus 3: FETAL ANATOMY  ---------------------------------------------------------------------------------------------------------  The following structures appear normal:  Head / Neck                         Cranium. Head size. Head shape. Lateral ventricles. Midline falx. Thalami.  Heart / Thorax                      4-chamber view. RVOT view. LVOT view. 3-vessel-trachea view.                                             Diaphragm.  Abdomen                             Stomach. Kidneys. Bladder.  Spine                                  Cervical spine. Thoracic spine. Lumbar spine. Sacral spine.     The following structures were documented previously:  Head / Neck                         Cavum septi pellucidi. Cisterna magna.  Face                                   Lips. Profile. Nose.     Gender: female.        Fetus 1: BIOPHYSICAL PROFILE  ---------------------------------------------------------------------------------------------------------  2: Fetal breathing movements  2: Gross body movements  2: Fetal  tone  2: Amniotic fluid volume  8/8 Biophysical profile score        Fetus 2: BIOPHYSICAL PROFILE  ---------------------------------------------------------------------------------------------------------  2: Fetal breathing movements  2: Gross body movements  2: Fetal tone  2: Amniotic fluid volume  8/8 Biophysical profile score        Fetus 3: BIOPHYSICAL PROFILE  ---------------------------------------------------------------------------------------------------------  2: Fetal breathing movements  2: Gross body movements  2: Fetal tone  2: Amniotic fluid volume  8/8 Biophysical profile score        Fetus 1: FETAL DOPPLER  ---------------------------------------------------------------------------------------------------------  Umbilical Artery: abnormal, Intermittent Elevated SD ratio  S / D                                       4.30                                                           >99%        Mili  HR                                          142                     bpm        Fetus 2: FETAL DOPPLER  ---------------------------------------------------------------------------------------------------------  Umbilical Artery:  S / D                                       2.90                                                           66%        Mili  HR                                          141                     bpm        MATERNAL STRUCTURES  ---------------------------------------------------------------------------------------------------------  Cervix                                  Suboptimal  Right Ovary                          Not examined  Left Ovary                            Not examined        RECOMMENDATION  ---------------------------------------------------------------------------------------------------------  We discussed the findings on today's ultrasound with the patient.     Continue  surveillance as previously recommended.     See Epic for details of today's ob visit.  No sonographic signs of TTTS. Unable to screen for TAPS today due to fetal head positions and inability to obtain MCA Dopplers.     Thank-you for the opportunity to participate in the care of this patient. If you have questions regarding today's evaluation or if we can be of further service, please contact the  [facility].     **Fetal anomalies may be present but not detected**                                                                         IMPRESSION  ---------------------------------------------------------------------------------------------------------  1) Dichorionic Triamniotic triplet pregnancy at 33 weeks 0/7 days gestational age. (Fetus 1 and 2 are monochorionic pair)  2) Visualized fetal anatomy appears normal for the fetuses, but very limited due to advanced gestational age.  3) Growth parameters are consistent with appropriate interval growth for all fetuses. Fetus 1 with growth parameters consistent with growth restriction with AC <5%ile.  4) The umbilical artery S/D ratio Dopplers are intermittently increased for Fetus 1. The MCA Dopplers cannot be obtained for Fetus 1 due to position.  5) The umbilical artery Dopplers are normal for Fetus 2. The MCA Doppler waveform cannot be obtained due to position.  6) The amniotic fluid volume appeared normal around all fetuses  7) BPPs were 8/8 in all fetuses    Prenatal Labs:   Lab Results   Component Value Date    ABO O 03/06/2020    RH Pos 03/06/2020    AS Neg 03/06/2020    HEPBANG Nonreactive 10/28/2019    CHPCRT Negative 03/06/2020    GCPCRT Negative 03/06/2020    HGB 11.3 (L) 03/06/2020     GBS Status:   Lab Results   Component Value Date    GBS Positive (A) 04/06/2020     Labs:   Results for orders placed or performed during the hospital encounter of 04/17/20 (from the past 24 hour(s))   Rupture of Fetal Membranes by ROM Plus   Result Value Ref Range    Rupture of Fetal Membranes by ROM Plus Positive (A) NEG^Negative      Assessment: 29 year old  at 33w4d, here for PPROM    Plan:  PPROM, r/o  Labor: patient presented with leakage of fluids, postivie ROMPlus, 2cm dilated.  - Admit to labor and delivery  - betamethasone to promote fetal lung maturity, second dose today 1400  - infectious work-up deferred as multifetal gestation is likely etiology of, UA collected  - GBS positive, will start PCN    # Dichorionic Triamniotic Triplets (Fetus 1 and 2 are monochorionic/diamniotic pair)   # Fetus 1 with fetal growth restriction, intermittently elevated UAR Dopplers, velamentous cord insertion.  - AC for F1 3%ile. Intermittently elevated S/D ratio UAR Dopplers.   - Patient previously counseled about maternal and pregnancy risks of triplet pregnancies.  - Fetal anatomy completed, EIF on fetus 3, declined testing.   - Fetal echo on 2020, without evidence of CHD on 3 fetuses.  ECHO to be performed within 24-48 hr.  - TTTS/TAPS check for mono-di pair has been negative     #Intrahepatic Cholestasis of pregnancy   -Bile acids 10> 12, Mildly elevated ALT, normalized.    -On ursodiol 300mg TID discontinue after delivery     # Pica  - Symptoms have improved.  - Hgb 12.1, Ferritin 6, Vitamin B12/Folate WNL.     # Anxiety/Depression  - PHQ-9 on 2/10 score was 20. Declined interventions.  - History of  depression.   - Declined  psychology.     History of Genital HSV:   No symptoms   On suppression with acyclovir  Plan for c/s delivery as above    Patient seen and care plan discussed under supervision of Dr. Julius Valdez MD  OBGYN PGY4  2020 8:28 AM     Physician Attestation   I, Alena Madrid MD, saw this patient with the resident and agree with the resident/fellow's findings and plan of care as documented in the note.      I personally reviewed vital signs, medications, labs and imaging.    Key findings: Di-tri triplets with FGR, abnormal UA Dopplers and velamentous cord of fetus 1  presented to  labor and PPROM. Upon admission she was 2 cm dilated. Plan was to observe, administer latency antibiotics and attempt to administer second BMZ injection. Sobeida began judy more uncomfortably and progressed from 2 cm to 5 cm so decision to deliver urgently was made. Plan for  section given triplet gestation with Dr. Packer.    Alena Madrid MD  Date of Service (when I saw the patient): 20

## 2020-04-17 NOTE — PLAN OF CARE
Patient arrived to [facility] unit via zoom cart at 1300,with belongings, accompanied by spouse/ significant other, with infant in NICU. Received report from Deepti Simpson RN and checked bands. Unit and room orientation started. Call light given and within arms reach; no concerns present at this time. Continue with plan of care.

## 2020-04-18 LAB — HGB BLD-MCNC: 10.8 G/DL (ref 11.7–15.7)

## 2020-04-18 PROCEDURE — 85018 HEMOGLOBIN: CPT | Performed by: STUDENT IN AN ORGANIZED HEALTH CARE EDUCATION/TRAINING PROGRAM

## 2020-04-18 PROCEDURE — 12000001 ZZH R&B MED SURG/OB UMMC

## 2020-04-18 PROCEDURE — 36415 COLL VENOUS BLD VENIPUNCTURE: CPT | Performed by: STUDENT IN AN ORGANIZED HEALTH CARE EDUCATION/TRAINING PROGRAM

## 2020-04-18 PROCEDURE — 25000128 H RX IP 250 OP 636: Performed by: STUDENT IN AN ORGANIZED HEALTH CARE EDUCATION/TRAINING PROGRAM

## 2020-04-18 PROCEDURE — 25000132 ZZH RX MED GY IP 250 OP 250 PS 637: Performed by: STUDENT IN AN ORGANIZED HEALTH CARE EDUCATION/TRAINING PROGRAM

## 2020-04-18 RX ORDER — ACETAMINOPHEN 325 MG/1
650 TABLET ORAL EVERY 6 HOURS PRN
Qty: 100 TABLET | Refills: 0 | Status: SHIPPED | OUTPATIENT
Start: 2020-04-18 | End: 2023-09-16

## 2020-04-18 RX ORDER — AMOXICILLIN 250 MG
1 CAPSULE ORAL DAILY
Qty: 100 TABLET | Refills: 0 | Status: SHIPPED | OUTPATIENT
Start: 2020-04-18 | End: 2023-09-16

## 2020-04-18 RX ORDER — IBUPROFEN 600 MG/1
600 TABLET, FILM COATED ORAL EVERY 6 HOURS PRN
Qty: 60 TABLET | Refills: 0 | Status: SHIPPED | OUTPATIENT
Start: 2020-04-18 | End: 2023-09-16

## 2020-04-18 RX ORDER — OXYCODONE HYDROCHLORIDE 5 MG/1
5-10 TABLET ORAL EVERY 4 HOURS PRN
Status: DISCONTINUED | OUTPATIENT
Start: 2020-04-18 | End: 2020-04-19 | Stop reason: HOSPADM

## 2020-04-18 RX ADMIN — SENNOSIDES AND DOCUSATE SODIUM 1 TABLET: 8.6; 5 TABLET ORAL at 08:01

## 2020-04-18 RX ADMIN — IBUPROFEN 800 MG: 800 TABLET, FILM COATED ORAL at 14:00

## 2020-04-18 RX ADMIN — ACETAMINOPHEN 975 MG: 325 TABLET ORAL at 14:00

## 2020-04-18 RX ADMIN — ACETAMINOPHEN 975 MG: 325 TABLET ORAL at 05:48

## 2020-04-18 RX ADMIN — OXYCODONE HYDROCHLORIDE 5 MG: 5 TABLET ORAL at 18:10

## 2020-04-18 RX ADMIN — KETOROLAC TROMETHAMINE 30 MG: 30 INJECTION, SOLUTION INTRAMUSCULAR at 05:49

## 2020-04-18 RX ADMIN — IBUPROFEN 800 MG: 800 TABLET, FILM COATED ORAL at 19:44

## 2020-04-18 RX ADMIN — SENNOSIDES AND DOCUSATE SODIUM 1 TABLET: 8.6; 5 TABLET ORAL at 19:44

## 2020-04-18 RX ADMIN — OXYCODONE HYDROCHLORIDE 5 MG: 5 TABLET ORAL at 14:07

## 2020-04-18 RX ADMIN — OXYCODONE HYDROCHLORIDE 5 MG: 5 TABLET ORAL at 22:48

## 2020-04-18 RX ADMIN — ACETAMINOPHEN 975 MG: 325 TABLET ORAL at 19:44

## 2020-04-18 RX ADMIN — OXYCODONE HYDROCHLORIDE 5 MG: 5 TABLET ORAL at 18:50

## 2020-04-18 NOTE — PROGRESS NOTES
"OB Progress Note  4/18/20    S: Doing well.  Pain well controlled.  Minimal lochia.  Tolerating regular diet.  Urinating since skelton removal.  No flatus yet.  Plans to go to NICU today, took yesterday to recover.  Overall feeling well today.    O:  /78   Pulse 84   Temp 98.2  F (36.8  C) (Oral)   Resp 20   Ht 1.702 m (5' 7\")   Wt 113.4 kg (250 lb)   LMP 08/26/2019   SpO2 98%   Breastfeeding Unknown   BMI 39.16 kg/m       General: NAD, A&Ox3  Resp: Non-labored breathing  Abdomen: Soft, Appropriately TTP, FF @ U  Incision: Covered with dressng  Extremities: Trace edema bilaterally    Hemoglobin   Date Value Ref Range Status   04/18/2020 10.8 (L) 11.7 - 15.7 g/dL Final   04/17/2020 12.3 11.7 - 15.7 g/dL Final     A/P: 28 yo  POD#1 s/p PLTCS for PTL in triplet pregnancy  1) Continue routine post-operative cares  2) Heme: 12.3>>10.8  3) Plans donor milk for babies  4) BC: Not discussed  5) Rh positive, RI  6) Dispo: Likely discharge POD#2-3    Angelika Rojo MD  "

## 2020-04-18 NOTE — DISCHARGE SUMMARY
[facility] Discharge Summary    Sobeida Fountain MRN# [id number]   Age: 29 year old YOB: 1990     Date of Admission:  2020  Date of Discharge::  2020  Admitting Physician:  Olinda Packer MD  Discharge Physician:  Maya Lozano MD MPH             Admission Diagnoses:   Intrauterine pregnancy at 33w4d  Triple gestation - dichorionic triamniotic   labor with rupture membranes          Discharge Diagnosis:     Same, delivered           Procedures:     Procedure(s): Primaru low transverse  section with double layer closure via Pfannenstiel skin incision  TAP block  Spinal                Medications Prior to Admission:     Facility-Administered Medications Prior to Admission   Medication Dose Route Frequency Provider Last Rate Last Dose     [COMPLETED] betamethasone acet & sod phos (CELESTONE) injection 12 mg  12 mg Intramuscular Once Chris Candelaria MD   12 mg at 20 1410     Medications Prior to Admission   Medication Sig Dispense Refill Last Dose     acyclovir (ZOVIRAX) 400 MG tablet Take 1 tablet (400 mg) by mouth 2 times daily 30 tablet 1 2020 at Unknown time     aspirin 81 MG EC tablet Take 81 mg by mouth daily   2020 at Unknown time     calcium carbonate (TUMS) 500 MG chewable tablet Take 1 chew tab by mouth 2 times daily   2020 at Unknown time     ferrous sulfate (FEROSUL) 325 (65 Fe) MG tablet Take 325 mg by mouth daily (with breakfast)   2020 at Unknown time     omeprazole 20 MG tablet Take 20 mg by mouth daily   2020 at 1000     Prenatal Vit-Fe Fumarate-FA (PRENATAL VITAMIN PO) Take 2 tablets by mouth daily   2020 at Unknown time     ursodiol (ACTIGALL) 300 MG capsule Take 1 capsule (300 mg) by mouth 3 times daily 90 capsule 0              Discharge Medications:      Sobeida Fountain   Home Medication Instructions ROMERO:[id number]    Printed on:20 1100   Medication Information                       acetaminophen (TYLENOL) 325 MG tablet  Take 2 tablets (650 mg) by mouth every 6 hours as needed for mild pain Start after Delivery.             acyclovir (ZOVIRAX) 400 MG tablet  Take 1 tablet (400 mg) by mouth 2 times daily             calcium carbonate (TUMS) 500 MG chewable tablet  Take 1 chew tab by mouth 2 times daily             ferrous sulfate (FEROSUL) 325 (65 Fe) MG tablet  Take 325 mg by mouth daily (with breakfast)             ibuprofen (ADVIL/MOTRIN) 600 MG tablet  Take 1 tablet (600 mg) by mouth every 6 hours as needed for moderate pain Start after delivery             omeprazole 20 MG tablet  Take 20 mg by mouth daily             oxyCODONE (ROXICODONE) 5 MG tablet  Take 1-2 tablets (5-10 mg) by mouth every 4 hours as needed for moderate to severe pain             Prenatal Vit-Fe Fumarate-FA (PRENATAL VITAMIN PO)  Take 2 tablets by mouth daily             senna-docusate (SENOKOT-S/PERICOLACE) 8.6-50 MG tablet  Take 1 tablet by mouth daily Start after delivery.                       Consultations:   Social Work  Anesthesia          Brief Admission History:   Ms. Sobeida Fountain is a 29 year old now  who initially presented at 33w4d for PPROM and  labor .       Intraoperative course   The procedure was uncomplicated.  EBL 1000 mL.  See operative report for details.     Fetus A: cephalic, vigorous,  male, Apgars 8/9.  Fetus 2: cephalic, vigorous,  male, Apgars 8/8.  Fetus 3: viable transverse  female, Apgars 4/9/9.   Normal appearing uterus, fallopian tubes, ovaries. No nuchal cords.  Fetus 1 and 2 with clear amniotic fluid, fetus 3 with meconium stained amniotic fluid.  Intact placenta, 3V cord. No fascial adhesions.  No intraabdominal adhesions.       Postpartum Course   The patient's hospital course was unremarkable.  She recovered as anticipated and experienced no post-operative complications.  On discharge, her pain was well controlled. Vaginal bleeding  is similar to peak menstrual flow.  Voiding without difficulty.  Ambulating well and tolerating a normal diet.  No fever or significant wound drainage.  Breastfeeding/Pumping well.  Infants are stable.  No bowel movement yet.  She was discharged on post-partum day #2.    Post-partum hemoglobin:   Hemoglobin   Date Value Ref Range Status   04/18/2020 10.8 (L) 11.7 - 15.7 g/dL Final             Discharge Instructions and Follow-Up:     Discharge diet: Regular   Discharge activity: No lifting greater than 20 lbs, pushing, pulling, or other strenuous activity for 6 weeks. Pelvic rest for 6 weeks including no sexual intercourse, tampons, or douching. No driving until you can slam on the breaks without pain or while on narcotic pain medications.    Discharge follow-up: Follow up with primary OB for routine postpartum visit in 6 weeks   Wound care: Keep incision clean and dry           Discharge Disposition:     Discharged to home      Maya Lozano MD MPH

## 2020-04-18 NOTE — PLAN OF CARE
Vitals are stable. Infants in NICU. Pain well managed with Tylenol and Toradol. Pt denied nausea. Tolerating regular diet. Fundus firm. Pt up to bathroom, tolerated it well. IV saline locked. Pt is more awake and will continue orientation to surroundings. Able to make her needs known, will continue with plan of care.  05:55 - Nino pulled. Pt ambulated to bathroom, cleanup and back to bed with no complaints.

## 2020-04-18 NOTE — PROGRESS NOTES
Post Partum Progress Note  PPD#2    Subjective:  She is resting comfortably in bed this morning. She complains of some incisional pain especially along the left side.  It's making it more difficult to sit up but otherwise okay.  She is tolerating PO intake. Lochia present and similar to menses.  She is voiding without difficulty. She has passed flatus and has not had a BM. She is ambulating without dizziness or difficulty.  She denies headache, changes in vision, nausea/vomiting, chest pain, shortness of breath, RUQ pain, or worsening edema.  Plans to breast and formula feed. Visiting babies in NICU.    Objective:  Vitals:    20 1634 20 1741 20 0000 20 0800   BP: 113/72 114/69 113/74 117/78   BP Location:       Cuff Size:       Pulse:   91 84   Resp:    Temp: 98.3  F (36.8  C) 98.6  F (37  C) 98.2  F (36.8  C) 98.2  F (36.8  C)   TempSrc: Oral Oral Oral Oral   SpO2: 98% 99% 97% 98%   Weight:       Height:           General: NAD, resting comfortably  CV: Regular rate, well perfused.   Pulm: Normal respiratory effort.  Abd: Soft, non-tender, non-distended. Fundus is firm  Incision:  incision is clean, dry, intact, steris  Ext: trace lower extremity edema bilaterally. No calf tenderness.    Assessment/Plan:  Sobeida Fountain is a 29 year old  female who is POD#2 s/p PLTCS for triplet gestation in  labor.    - Encourage routine post-operative goals including ambulation and incentive spirometry  - PNC: Rh pos. Rubella immune  - Pain: on oral medications, adding lidocaine patch for suboptimal control  - Heme: Hgb 12.3>EBL 1000>10.8  - GI: continue anti-emetics and stool softeners as needed.  - : Voiding spontaneously.  - Infant: Stable in NICU  - Feeding: Plans on breast and bottlefeeding.    Discharge to home on POD#2-3    Sobeida Daiz MD  Obstetrics & Gynecology, PGY-2  2020 6:25 AM    I personally examined and evaluated Sobeida Fountain on 2020.  I  discussed the patient with Dr. Diaz and agree with the presentation, exam and plan of care documented in this note with edits by me.  Neonates being transferred to [facility] today; patient desires discharge (offered to call provider at [facility] about transfer for Sobeida too but patient feels she will do fine as long as she has medication to take at home).  Meeting goals, discharge today.  aMya Lozano MD MPH

## 2020-04-18 NOTE — PLAN OF CARE
Pt doing very well on POD1. Voiding without difficulty. Showered independently, incisional dressing removed. Ambulated to NICU for extended visit with baby. Taking ibuprofen, tylenol, and riri for incisional pain. Also given abdominal binder for PRN use. Passing gas, mild distension, no nausea. Planning to nap now and then visit triplets again. EDS score=3.

## 2020-04-19 VITALS
DIASTOLIC BLOOD PRESSURE: 87 MMHG | SYSTOLIC BLOOD PRESSURE: 124 MMHG | OXYGEN SATURATION: 98 % | HEIGHT: 67 IN | BODY MASS INDEX: 39.24 KG/M2 | TEMPERATURE: 97.9 F | WEIGHT: 250 LBS | RESPIRATION RATE: 18 BRPM | HEART RATE: 85 BPM

## 2020-04-19 PROCEDURE — 25000132 ZZH RX MED GY IP 250 OP 250 PS 637: Performed by: STUDENT IN AN ORGANIZED HEALTH CARE EDUCATION/TRAINING PROGRAM

## 2020-04-19 RX ORDER — OXYCODONE HYDROCHLORIDE 5 MG/1
5-10 TABLET ORAL EVERY 4 HOURS PRN
Qty: 12 TABLET | Refills: 0 | Status: SHIPPED | OUTPATIENT
Start: 2020-04-19 | End: 2023-09-16

## 2020-04-19 RX ORDER — LIDOCAINE 4 G/G
1 PATCH TOPICAL
Status: DISCONTINUED | OUTPATIENT
Start: 2020-04-19 | End: 2020-04-19 | Stop reason: HOSPADM

## 2020-04-19 RX ORDER — LIDOCAINE 4 G/G
1 PATCH TOPICAL EVERY 24 HOURS
Qty: 5 PATCH | Refills: 0 | Status: SHIPPED | OUTPATIENT
Start: 2020-04-19 | End: 2020-04-24

## 2020-04-19 RX ADMIN — ACETAMINOPHEN 975 MG: 325 TABLET ORAL at 01:50

## 2020-04-19 RX ADMIN — OXYCODONE HYDROCHLORIDE 5 MG: 5 TABLET ORAL at 07:03

## 2020-04-19 RX ADMIN — IBUPROFEN 800 MG: 800 TABLET, FILM COATED ORAL at 13:41

## 2020-04-19 RX ADMIN — SENNOSIDES AND DOCUSATE SODIUM 1 TABLET: 8.6; 5 TABLET ORAL at 07:58

## 2020-04-19 RX ADMIN — OXYCODONE HYDROCHLORIDE 10 MG: 5 TABLET ORAL at 10:09

## 2020-04-19 RX ADMIN — ACETAMINOPHEN 975 MG: 325 TABLET ORAL at 07:58

## 2020-04-19 RX ADMIN — IBUPROFEN 800 MG: 800 TABLET, FILM COATED ORAL at 01:50

## 2020-04-19 RX ADMIN — ACETAMINOPHEN 975 MG: 325 TABLET ORAL at 13:41

## 2020-04-19 RX ADMIN — LIDOCAINE 1 PATCH: 560 PATCH PERCUTANEOUS; TOPICAL; TRANSDERMAL at 07:58

## 2020-04-19 RX ADMIN — IBUPROFEN 800 MG: 800 TABLET, FILM COATED ORAL at 07:58

## 2020-04-19 NOTE — PROVIDER NOTIFICATION
04/19/20 1416   Provider Notification   Provider Name/Title Marta PANCHAL   Method of Notification Electronic Page   Notification Reason Medication Request  (Pt requesting lidocaine patches for DC. Thanks!)

## 2020-04-19 NOTE — PLAN OF CARE
VSS. LS CTA. Pt denies headache, dizziness or changes to vision. Breasts are soft, pt is not breastfeeding and is taking measures to inhibit milk production. BS+, flatus+, BM-. Patient is taking stool softeners. Pt is voiding independently and with out difficulty. FF@ U/1, midline. Perineum is intact.  incision is open to air, edges are approximated, steri strips intact, no drainage or signs of infection at site. Small amount of rubra lochia. Pt denies pain in legs, no clonus, +2 to +3 edema in bilateral LEs. Pt c/o incisional pain. Patient is taking ibuprofen and Tylenol and PRN roxicodone to manage her pain. Patient speaks lovingly of her triplet newborns in the NICU. FOB is supportive and at bedside.

## 2020-04-19 NOTE — PLAN OF CARE
Pt caring for self independently. Ambulating to NICU to visit babies prior to their transfer to [facility]. Discharge instructions & medications reviewed. (Instructed to  lidocaine patch rx @ [facility] [address]). Pt states she has no further questions/concerns @ this time. Discharged to home.

## 2020-04-21 LAB
BLD PROD TYP BPU: NORMAL
BLD PROD TYP BPU: NORMAL
BLD UNIT ID BPU: 0
BLD UNIT ID BPU: NORMAL
BLOOD PRODUCT CODE: NORMAL
BLOOD PRODUCT CODE: NORMAL
BPU ID: NORMAL
BPU ID: NORMAL
TRANSFUSION STATUS PATIENT QL: NORMAL

## 2020-04-29 LAB — COPATH REPORT: NORMAL

## 2020-11-15 NOTE — PROGRESS NOTES
Late entry: patient seen at 1740    Patient c/o contractions returning.  States that they are not as frequent, but are still painful.     Repeat cervical exam:  Closed/soft, high    Tracings appropriate x 3    Plan:  Cont magnesium x 12 hrs  Patient can eat something light  Continue indocin  2nd BMZ tomorrow  If fetal tracings are reassuring patient can rest tonight off magnesium and plan for TID monitoring.  Patient to alert RN if more contractions in which case I would like fetal monitoring and toco.      Addendum:  Patient seen again at 8 pm.  Feeling much better, basically states the contractions have stopped.      Leni Beck DO FACOG  Maternal Fetal Medicine Specialist  Pager: [phone]  Mobile: [phone]     Patient given something to drink and instructed to notify nurse when bladder feels full as directed by ultrasound     Adebayo Paulino RN  11/15/20 [id number]

## 2021-01-11 NOTE — CONSULTS
NICU Consult Note    NOTE: DURING NICU CONSULT, MOTHER STRONGLY INSISTED THAT NO STUDENTS BE PRESENT AT THE DELIVERY AND NO STUDENTS PERFORM ANY PROCEDURES. SHE DID NOT EXPLICITLY EXCLUDE RESIDENTS OR ORIENTEES.     I was asked to see Sobeida Fountain, who is pregnant with dichorionic triamniotic triplets at 27w4d estimated gestation, to discuss the complications, risks, and potential outcomes of  birth of multiples.  Ms. Fountain presented with contractions concerning for  labor. Cervix is closed. She is receiving a magnesium load, betamethasone and indomethacin.      I met with Ms. Fountain in her room. We discussed the delivery room management of a  infant, including NICU presence at the delivery, importance of warming, and potential need for respiratory support including oxygen, CPAP and intubation, depending on the gestation and clinical needs of her babies. Central line placement and fluid administration was briefly described.    We reviewed the risks of  birth including infection, respiratory distress/chronic lung disease of prematurity, feeding intolerance/necrotizing enterocolitis, IVH, ROP, and hearing loss. We talked about common feeding practices of  neonates, as well as the benefits of breastmilk, ideally maternal but also donor breastmilk. General criteria of discharge were discussed, as well as projected range of neurodevelopmental outcomes for 27 week  infants. I emphasized the importance of parental involvement and welcomed her participation in the care of her infant. All questions were answered, and she was encouraged to notify the NICU if she has any further questions or concerns.      Plan:  NICU presence at delivery with 3 resuscitation teams, full resuscitation, and admission to the NICU     Thank you for asking us to to see this patient.  If further questions arise, please do not hesitate to contact us.    Total time spent in consult = 30 minutes, with  greater than 50% of time spent in face-to-face counseling.    Pinky Catalan MD  - Fellow  [facility]       56

## 2021-06-06 NOTE — PROGRESS NOTES
"Please see the \"Imaging\" tab for details of today's ultrasound.    Alena Madrid MD  Specialist in Maternal-Fetal Medicine    "

## 2022-07-29 ENCOUNTER — APPOINTMENT (OUTPATIENT)
Dept: CT IMAGING | Facility: HOSPITAL | Age: 32
End: 2022-07-29
Attending: STUDENT IN AN ORGANIZED HEALTH CARE EDUCATION/TRAINING PROGRAM
Payer: COMMERCIAL

## 2022-07-29 ENCOUNTER — HOSPITAL ENCOUNTER (EMERGENCY)
Facility: HOSPITAL | Age: 32
Discharge: HOME | End: 2022-07-29
Attending: STUDENT IN AN ORGANIZED HEALTH CARE EDUCATION/TRAINING PROGRAM | Admitting: STUDENT IN AN ORGANIZED HEALTH CARE EDUCATION/TRAINING PROGRAM
Payer: COMMERCIAL

## 2022-07-29 VITALS
DIASTOLIC BLOOD PRESSURE: 63 MMHG | HEIGHT: 67 IN | TEMPERATURE: 97.7 F | SYSTOLIC BLOOD PRESSURE: 109 MMHG | RESPIRATION RATE: 14 BRPM | WEIGHT: 235 LBS | HEART RATE: 82 BPM | BODY MASS INDEX: 36.88 KG/M2 | OXYGEN SATURATION: 100 %

## 2022-07-29 DIAGNOSIS — R07.9 CHEST PAIN, UNSPECIFIED TYPE: ICD-10-CM

## 2022-07-29 LAB
ANION GAP SERPL CALCULATED.3IONS-SCNC: 8 MMOL/L (ref 5–18)
BASOPHILS # BLD AUTO: 0.1 10E3/UL (ref 0–0.2)
BASOPHILS NFR BLD AUTO: 1 %
BUN SERPL-MCNC: 11 MG/DL (ref 8–22)
CALCIUM SERPL-MCNC: 8.7 MG/DL (ref 8.5–10.5)
CHLORIDE BLD-SCNC: 107 MMOL/L (ref 98–107)
CO2 SERPL-SCNC: 24 MMOL/L (ref 22–31)
CREAT SERPL-MCNC: 0.82 MG/DL (ref 0.6–1.1)
D DIMER PPP FEU-MCNC: 0.75 UG/ML FEU (ref 0–0.5)
EOSINOPHIL # BLD AUTO: 0.2 10E3/UL (ref 0–0.7)
EOSINOPHIL NFR BLD AUTO: 2 %
ERYTHROCYTE [DISTWIDTH] IN BLOOD BY AUTOMATED COUNT: 11.8 % (ref 10–15)
GFR SERPL CREATININE-BSD FRML MDRD: >90 ML/MIN/1.73M2
GLUCOSE BLD-MCNC: 85 MG/DL (ref 70–125)
HCG SERPL-ACNC: <2 MLU/ML (ref 0–4)
HCT VFR BLD AUTO: 45.7 % (ref 35–47)
HGB BLD-MCNC: 14.8 G/DL (ref 11.7–15.7)
IMM GRANULOCYTES # BLD: 0 10E3/UL
IMM GRANULOCYTES NFR BLD: 0 %
LYMPHOCYTES # BLD AUTO: 3.7 10E3/UL (ref 0.8–5.3)
LYMPHOCYTES NFR BLD AUTO: 41 %
MCH RBC QN AUTO: 29.6 PG (ref 26.5–33)
MCHC RBC AUTO-ENTMCNC: 32.4 G/DL (ref 31.5–36.5)
MCV RBC AUTO: 91 FL (ref 78–100)
MONOCYTES # BLD AUTO: 0.5 10E3/UL (ref 0–1.3)
MONOCYTES NFR BLD AUTO: 5 %
NEUTROPHILS # BLD AUTO: 4.8 10E3/UL (ref 1.6–8.3)
NEUTROPHILS NFR BLD AUTO: 51 %
NRBC # BLD AUTO: 0 10E3/UL
NRBC BLD AUTO-RTO: 0 /100
PLATELET # BLD AUTO: 180 10E3/UL (ref 150–450)
POTASSIUM BLD-SCNC: 4.4 MMOL/L (ref 3.5–5)
RBC # BLD AUTO: 5 10E6/UL (ref 3.8–5.2)
SODIUM SERPL-SCNC: 139 MMOL/L (ref 136–145)
TROPONIN I SERPL-MCNC: <0.01 NG/ML (ref 0–0.29)
WBC # BLD AUTO: 9.2 10E3/UL (ref 4–11)

## 2022-07-29 PROCEDURE — 85025 COMPLETE CBC W/AUTO DIFF WBC: CPT | Performed by: STUDENT IN AN ORGANIZED HEALTH CARE EDUCATION/TRAINING PROGRAM

## 2022-07-29 PROCEDURE — 82310 ASSAY OF CALCIUM: CPT | Performed by: STUDENT IN AN ORGANIZED HEALTH CARE EDUCATION/TRAINING PROGRAM

## 2022-07-29 PROCEDURE — 250N000011 HC RX IP 250 OP 636: Performed by: STUDENT IN AN ORGANIZED HEALTH CARE EDUCATION/TRAINING PROGRAM

## 2022-07-29 PROCEDURE — 71275 CT ANGIOGRAPHY CHEST: CPT

## 2022-07-29 PROCEDURE — 96374 THER/PROPH/DIAG INJ IV PUSH: CPT | Mod: 59

## 2022-07-29 PROCEDURE — 96375 TX/PRO/DX INJ NEW DRUG ADDON: CPT

## 2022-07-29 PROCEDURE — 84702 CHORIONIC GONADOTROPIN TEST: CPT | Performed by: STUDENT IN AN ORGANIZED HEALTH CARE EDUCATION/TRAINING PROGRAM

## 2022-07-29 PROCEDURE — 93005 ELECTROCARDIOGRAM TRACING: CPT | Performed by: STUDENT IN AN ORGANIZED HEALTH CARE EDUCATION/TRAINING PROGRAM

## 2022-07-29 PROCEDURE — 99285 EMERGENCY DEPT VISIT HI MDM: CPT | Mod: 25

## 2022-07-29 PROCEDURE — 36415 COLL VENOUS BLD VENIPUNCTURE: CPT | Performed by: STUDENT IN AN ORGANIZED HEALTH CARE EDUCATION/TRAINING PROGRAM

## 2022-07-29 PROCEDURE — 85379 FIBRIN DEGRADATION QUANT: CPT | Performed by: STUDENT IN AN ORGANIZED HEALTH CARE EDUCATION/TRAINING PROGRAM

## 2022-07-29 PROCEDURE — 84484 ASSAY OF TROPONIN QUANT: CPT | Performed by: STUDENT IN AN ORGANIZED HEALTH CARE EDUCATION/TRAINING PROGRAM

## 2022-07-29 RX ORDER — ONDANSETRON 2 MG/ML
4 INJECTION INTRAMUSCULAR; INTRAVENOUS ONCE
Status: COMPLETED | OUTPATIENT
Start: 2022-07-29 | End: 2022-07-29

## 2022-07-29 RX ORDER — KETOROLAC TROMETHAMINE 15 MG/ML
15 INJECTION, SOLUTION INTRAMUSCULAR; INTRAVENOUS ONCE
Status: COMPLETED | OUTPATIENT
Start: 2022-07-29 | End: 2022-07-29

## 2022-07-29 RX ORDER — IOPAMIDOL 755 MG/ML
75 INJECTION, SOLUTION INTRAVASCULAR ONCE
Status: DISCONTINUED | OUTPATIENT
Start: 2022-07-29 | End: 2022-07-29 | Stop reason: CLARIF

## 2022-07-29 RX ORDER — IOPAMIDOL 755 MG/ML
100 INJECTION, SOLUTION INTRAVASCULAR ONCE
Status: COMPLETED | OUTPATIENT
Start: 2022-07-29 | End: 2022-07-29

## 2022-07-29 RX ADMIN — IOPAMIDOL 100 ML: 755 INJECTION, SOLUTION INTRAVENOUS at 08:34

## 2022-07-29 RX ADMIN — ONDANSETRON 4 MG: 2 INJECTION INTRAMUSCULAR; INTRAVENOUS at 07:01

## 2022-07-29 RX ADMIN — KETOROLAC TROMETHAMINE 15 MG: 15 INJECTION, SOLUTION INTRAMUSCULAR; INTRAVENOUS at 07:00

## 2022-07-29 NOTE — ED TRIAGE NOTES
Chest pain today that woke pt from sleep. Pt states that pain radiates to mid back.      Triage Assessment     Row Name 07/29/22 0614       Triage Assessment (Adult)    Airway WDL WDL       Respiratory WDL    Respiratory WDL WDL       Skin Circulation/Temperature WDL    Skin Circulation/Temperature WDL WDL       Cardiac WDL    Cardiac WDL chest pain       Chest Pain Assessment    Chest Pain Location midsternal;upper back, left;upper back, right    Chest Pain Radiation back    Character aching    Precipitating Factors unknown    Alleviating Factors nothing    Associated Signs/Symptoms anxiety       Peripheral/Neurovascular WDL    Peripheral Neurovascular WDL WDL       Cognitive/Neuro/Behavioral WDL    Cognitive/Neuro/Behavioral WDL WDL

## 2022-07-29 NOTE — ED PROVIDER NOTES
Emergency Department Encounter         FINAL IMPRESSION:  Chest pain, back pain        ED COURSE AND MEDICAL DECISION MAKING   6:39 AM Met with and introduced myself to the patient. Discussed history and plan of care.  9:03 AM Rechecked and updated patient on imaging results.    ED Course as of 07/29/22 0903   Fri Jul 29, 2022   0633 EKG is sinus rhythm 84, no signs acute ischemia, no inversions no depressions QTC is 451, no old EKGs for comparison.   0639 31-year-old obese female with no other chronic medical problems here with chest pain with radiation into back that woke her from sleep. States that this doesn't feel like reflux.  Has been doing well this week otherwise.  States this could be her anxiety.  No bowel movement changes, fevers abdominal pain or significant new leg swelling.  Patient states her left leg has been swollen for years.  No birth control.  Recently had another child 5 months ago.  On arrival she looks well and comfortable.  Vitals are stable.  EKG as above.  She has no inappropriate tachycardia or hypoxia suggesting submassive PE.  Because of her recent child, we cannot PERC her out.  We will obtain D-dimer.  This should also do a reasonable job screening for dissection as well though unlikely considering patient's clinical status and how she looks today.  She has no hypertension.  She is not diaphoretic.  She looks comfortable.       -CT normal.  Labs normal.  Patient states her chest pain or back pain is now gone away after Toradol and Zofran.  When I reevaluated her to discuss the normal laboratory and imaging evaluation, patient stated that both her hands and her feet are numb and tingly.  Repeat neuro examination is unremarkable.  These are subjective paresthesias.  She has no cranial nerve deficits.  She has no dysmetria.  Her strength is normal.  Recommending close outpatient follow-up.  Patient declined primary care referral.                   At the conclusion of the encounter I  discussed the results of all the tests and the disposition. The questions were answered. The patient or family acknowledged understanding and was agreeable with the care plan.                  MEDICATIONS GIVEN IN THE EMERGENCY DEPARTMENT:  Medications   ondansetron (ZOFRAN) injection 4 mg (4 mg Intravenous Given 22 0701)   ketorolac (TORADOL) injection 15 mg (15 mg Intravenous Given 22 0700)   iopamidol (ISOVUE-370) solution 100 mL (100 mLs Intravenous Given 22 0834)       NEW PRESCRIPTIONS STARTED AT TODAY'S ED VISIT:  New Prescriptions    No medications on file       HPI     Patient information obtained from: Patient    Use of Interpretor: N/A     Sobeida Fountain is a 31 year old female with a pertinent history of depressive disorder, obesity, and recent  delivery who presents to this ED via walk in for evaluation of chest pain.    The patient reports that around 5 AM today she was awoken with sudden onset chest pain located in the middle of her chest. States that she does have anxiety, so she initially attributed this to her symptoms; however, she ruled this out upon feeling lightheaded, nauseous, and when she noticed associated upper back pain. Reports that she has had acid reflux before, but states that her current symptoms feel different. She has not had similar symptoms before today. Reports chronic left leg swelling, but denies any new changes. No history of blood clots. The patient is not on birth control. No other medical problems. Denies vomiting, fever, urinary symptoms, sore throat, excessive burping, black stool, or any other complaints at this time.    REVIEW OF SYSTEMS:  Review of Systems   Constitutional: Negative for fever, malaise  HEENT: Negative runny nose, sore throat, ear pain, neck pain  Respiratory: Negative for shortness of breath, cough, congestion  Cardiovascular: Positive for chest pain and chronic, non-worsening edema.   Gastrointestinal: Negative for abdominal  "distention, abdominal pain, constipation, vomiting, diarrhea, black stool. Positive for nausea.  Genitourinary: Negative for dysuria and hematuria.   Integument: Negative for rash, skin breakdown  Neurological: Negative for paresthesias, weakness, headache. Positive for lightheadedness.   Musculoskeletal: Negative for joint pain, joint swelling. Positive for upper back pain.      All other systems reviewed and are negative.      MEDICAL HISTORY     Past Medical History:   Diagnosis Date     Depressive disorder        Past Surgical History:   Procedure Laterality Date      SECTION N/A 2020    Procedure:  SECTION;  Surgeon: Olinda Packer MD;  Location:  L+D       Social History     Substance Use Topics     Alcohol use: Not Currently     Drug use: Not Currently       acetaminophen (TYLENOL) 325 MG tablet  acyclovir (ZOVIRAX) 400 MG tablet  calcium carbonate (TUMS) 500 MG chewable tablet  ferrous sulfate (FEROSUL) 325 (65 Fe) MG tablet  ibuprofen (ADVIL/MOTRIN) 600 MG tablet  omeprazole 20 MG tablet  oxyCODONE (ROXICODONE) 5 MG tablet  Prenatal Vit-Fe Fumarate-FA (PRENATAL VITAMIN PO)  senna-docusate (SENOKOT-S/PERICOLACE) 8.6-50 MG tablet            PHYSICAL EXAM     /63   Pulse 82   Temp 97.7  F (36.5  C) (Temporal)   Resp 14   Ht 1.689 m (5' 6.5\")   Wt 106.6 kg (235 lb)   LMP 07/15/2022   SpO2 100%   Breastfeeding No   BMI 37.36 kg/m        PHYSICAL EXAM:     General: Patient appears well, nontoxic, comfortable. Obese female.  HEENT: Moist mucous membranes, no tongue swelling.  No head trauma.  No midline neck pain.  Cardiovascular: Normal rate, normal rhythm. No appreciable murmur. Chronic, non-worsening left leg edema.  Respiratory: No signs of respiratory distress, lungs are clear to auscultation bilaterally with no wheezes rhonchi or rales.  Abdominal: Soft, nontender, nondistended, no palpable masses, no guarding, no rebound  Musculoskeletal: Full range of motion " of joints, no deformities appreciated.  Neurological: Alert and oriented, grossly neurologically intact.  Psychological: Normal affect and mood.  Integument: No rashes appreciated      RESULTS       Labs Ordered and Resulted from Time of ED Arrival to Time of ED Departure   D DIMER QUANTITATIVE - Abnormal       Result Value    D-Dimer Quantitative 0.75 (*)    BASIC METABOLIC PANEL - Normal    Sodium 139      Potassium 4.4      Chloride 107      Carbon Dioxide (CO2) 24      Anion Gap 8      Urea Nitrogen 11      Creatinine 0.82      Calcium 8.7      Glucose 85      GFR Estimate >90     TROPONIN I - Normal    Troponin I <0.01     HCG QUANTITATIVE PREGNANCY - Normal    hCG Quantitative <2     CBC WITH PLATELETS AND DIFFERENTIAL    WBC Count 9.2      RBC Count 5.00      Hemoglobin 14.8      Hematocrit 45.7      MCV 91      MCH 29.6      MCHC 32.4      RDW 11.8      Platelet Count 180      % Neutrophils 51      % Lymphocytes 41      % Monocytes 5      % Eosinophils 2      % Basophils 1      % Immature Granulocytes 0      NRBCs per 100 WBC 0      Absolute Neutrophils 4.8      Absolute Lymphocytes 3.7      Absolute Monocytes 0.5      Absolute Eosinophils 0.2      Absolute Basophils 0.1      Absolute Immature Granulocytes 0.0      Absolute NRBCs 0.0         CT Chest Pulmonary Embolism w Contrast   Final Result   IMPRESSION:   1.  No obvious findings to explain symptoms.   2.  No pulmonary embolism.   3.  Nonaneurysmal aorta without dissection.                              PROCEDURES:  Procedures:  Procedures       I, Pinky Frankel am serving as a scribe to document services personally performed by Abram Friedman DO, based on my observations and the provider's statements to me.  I, Abram Friedman DO, attest that Pinky Frankel is acting in a scribe capacity, has observed my performance of the services and has documented them in accordance with my direction.    Abram Friedman DO  Emergency Medicine  [facility]  EMERGENCY DEPARTMENT     Ohl, Abram Monroe,   07/29/22 0976

## 2022-07-30 LAB
ATRIAL RATE - MUSE: 84 BPM
DIASTOLIC BLOOD PRESSURE - MUSE: NORMAL MMHG
INTERPRETATION ECG - MUSE: NORMAL
P AXIS - MUSE: 68 DEGREES
PR INTERVAL - MUSE: 166 MS
QRS DURATION - MUSE: 86 MS
QT - MUSE: 382 MS
QTC - MUSE: 451 MS
R AXIS - MUSE: 72 DEGREES
SYSTOLIC BLOOD PRESSURE - MUSE: NORMAL MMHG
T AXIS - MUSE: 51 DEGREES
VENTRICULAR RATE- MUSE: 84 BPM

## 2022-11-28 ENCOUNTER — LAB REQUISITION (OUTPATIENT)
Dept: LAB | Facility: CLINIC | Age: 32
End: 2022-11-28
Payer: COMMERCIAL

## 2022-11-28 DIAGNOSIS — Z36.89 ENCOUNTER FOR OTHER SPECIFIED ANTENATAL SCREENING: ICD-10-CM

## 2022-11-28 LAB
BASOPHILS # BLD AUTO: 0.1 10E3/UL (ref 0–0.2)
BASOPHILS NFR BLD AUTO: 1 %
EOSINOPHIL # BLD AUTO: 0 10E3/UL (ref 0–0.7)
EOSINOPHIL NFR BLD AUTO: 0 %
ERYTHROCYTE [DISTWIDTH] IN BLOOD BY AUTOMATED COUNT: 12.2 % (ref 10–15)
HCT VFR BLD AUTO: 40.5 % (ref 35–47)
HGB BLD-MCNC: 13.2 G/DL (ref 11.7–15.7)
IMM GRANULOCYTES # BLD: 0 10E3/UL
IMM GRANULOCYTES NFR BLD: 0 %
LYMPHOCYTES # BLD AUTO: 2.1 10E3/UL (ref 0.8–5.3)
LYMPHOCYTES NFR BLD AUTO: 20 %
MCH RBC QN AUTO: 29.5 PG (ref 26.5–33)
MCHC RBC AUTO-ENTMCNC: 32.6 G/DL (ref 31.5–36.5)
MCV RBC AUTO: 90 FL (ref 78–100)
MONOCYTES # BLD AUTO: 0.4 10E3/UL (ref 0–1.3)
MONOCYTES NFR BLD AUTO: 3 %
NEUTROPHILS # BLD AUTO: 8.1 10E3/UL (ref 1.6–8.3)
NEUTROPHILS NFR BLD AUTO: 76 %
NRBC # BLD AUTO: 0 10E3/UL
NRBC BLD AUTO-RTO: 0 /100
PLATELET # BLD AUTO: 269 10E3/UL (ref 150–450)
RBC # BLD AUTO: 4.48 10E6/UL (ref 3.8–5.2)
WBC # BLD AUTO: 10.6 10E3/UL (ref 4–11)

## 2022-11-28 PROCEDURE — 87086 URINE CULTURE/COLONY COUNT: CPT | Mod: ORL | Performed by: NURSE PRACTITIONER

## 2022-11-28 PROCEDURE — 85025 COMPLETE CBC W/AUTO DIFF WBC: CPT | Mod: ORL | Performed by: NURSE PRACTITIONER

## 2022-11-28 PROCEDURE — 87591 N.GONORRHOEAE DNA AMP PROB: CPT | Performed by: NURSE PRACTITIONER

## 2022-11-28 PROCEDURE — 86803 HEPATITIS C AB TEST: CPT | Mod: ORL | Performed by: NURSE PRACTITIONER

## 2022-11-28 PROCEDURE — 86780 TREPONEMA PALLIDUM: CPT | Performed by: NURSE PRACTITIONER

## 2022-11-28 PROCEDURE — 87340 HEPATITIS B SURFACE AG IA: CPT | Mod: ORL | Performed by: NURSE PRACTITIONER

## 2022-11-28 PROCEDURE — 86762 RUBELLA ANTIBODY: CPT | Mod: ORL | Performed by: NURSE PRACTITIONER

## 2022-11-28 PROCEDURE — 87389 HIV-1 AG W/HIV-1&-2 AB AG IA: CPT | Mod: ORL | Performed by: NURSE PRACTITIONER

## 2022-11-28 PROCEDURE — 87491 CHLMYD TRACH DNA AMP PROBE: CPT | Performed by: NURSE PRACTITIONER

## 2022-11-29 LAB
C TRACH DNA SPEC QL PROBE+SIG AMP: NEGATIVE
HBV SURFACE AG SERPL QL IA: NONREACTIVE
HCV AB SERPL QL IA: NONREACTIVE
HIV 1+2 AB+HIV1 P24 AG SERPL QL IA: NONREACTIVE
N GONORRHOEA DNA SPEC QL NAA+PROBE: NEGATIVE
RUBV IGG SERPL QL IA: 2.26 INDEX
RUBV IGG SERPL QL IA: POSITIVE
T PALLIDUM AB SER QL: NONREACTIVE

## 2022-11-30 LAB — BACTERIA UR CULT: NORMAL

## 2023-09-16 ENCOUNTER — APPOINTMENT (OUTPATIENT)
Dept: ULTRASOUND IMAGING | Facility: HOSPITAL | Age: 33
End: 2023-09-16
Attending: EMERGENCY MEDICINE
Payer: COMMERCIAL

## 2023-09-16 ENCOUNTER — ANESTHESIA (OUTPATIENT)
Dept: SURGERY | Facility: HOSPITAL | Age: 33
End: 2023-09-16
Payer: COMMERCIAL

## 2023-09-16 ENCOUNTER — ANESTHESIA EVENT (OUTPATIENT)
Dept: SURGERY | Facility: HOSPITAL | Age: 33
End: 2023-09-16
Payer: COMMERCIAL

## 2023-09-16 ENCOUNTER — HOSPITAL ENCOUNTER (OUTPATIENT)
Facility: HOSPITAL | Age: 33
Setting detail: OBSERVATION
Discharge: HOME | End: 2023-09-16
Attending: EMERGENCY MEDICINE | Admitting: SURGERY
Payer: COMMERCIAL

## 2023-09-16 ENCOUNTER — APPOINTMENT (OUTPATIENT)
Dept: CT IMAGING | Facility: HOSPITAL | Age: 33
End: 2023-09-16
Attending: EMERGENCY MEDICINE
Payer: COMMERCIAL

## 2023-09-16 VITALS
BODY MASS INDEX: 35.84 KG/M2 | RESPIRATION RATE: 19 BRPM | HEART RATE: 78 BPM | DIASTOLIC BLOOD PRESSURE: 78 MMHG | OXYGEN SATURATION: 96 % | HEIGHT: 66 IN | SYSTOLIC BLOOD PRESSURE: 128 MMHG | TEMPERATURE: 98.7 F | WEIGHT: 223 LBS

## 2023-09-16 DIAGNOSIS — R10.30 LOWER ABDOMINAL PAIN: ICD-10-CM

## 2023-09-16 DIAGNOSIS — G89.18 POST-OP PAIN: Primary | ICD-10-CM

## 2023-09-16 DIAGNOSIS — R10.11 RUQ ABDOMINAL PAIN: ICD-10-CM

## 2023-09-16 DIAGNOSIS — K81.0 ACUTE CHOLECYSTITIS: Primary | ICD-10-CM

## 2023-09-16 LAB
ALBUMIN SERPL BCG-MCNC: 4.4 G/DL (ref 3.5–5.2)
ALBUMIN UR-MCNC: 10 MG/DL
ALP SERPL-CCNC: 68 U/L (ref 35–104)
ALT SERPL W P-5'-P-CCNC: 43 U/L (ref 0–50)
ANION GAP SERPL CALCULATED.3IONS-SCNC: 10 MMOL/L (ref 7–15)
APPEARANCE UR: ABNORMAL
AST SERPL W P-5'-P-CCNC: 45 U/L (ref 0–45)
BACTERIA #/AREA URNS HPF: ABNORMAL /HPF
BASOPHILS # BLD AUTO: 0.1 10E3/UL (ref 0–0.2)
BASOPHILS NFR BLD AUTO: 1 %
BILIRUB DIRECT SERPL-MCNC: <0.2 MG/DL (ref 0–0.3)
BILIRUB SERPL-MCNC: 0.2 MG/DL
BILIRUB UR QL STRIP: NEGATIVE
BUN SERPL-MCNC: 13.5 MG/DL (ref 6–20)
CALCIUM SERPL-MCNC: 9 MG/DL (ref 8.6–10)
CHLORIDE SERPL-SCNC: 106 MMOL/L (ref 98–107)
COLOR UR AUTO: YELLOW
CREAT SERPL-MCNC: 0.74 MG/DL (ref 0.51–0.95)
DEPRECATED HCO3 PLAS-SCNC: 25 MMOL/L (ref 22–29)
EGFRCR SERPLBLD CKD-EPI 2021: >90 ML/MIN/1.73M2
EOSINOPHIL # BLD AUTO: 0.1 10E3/UL (ref 0–0.7)
EOSINOPHIL NFR BLD AUTO: 1 %
ERYTHROCYTE [DISTWIDTH] IN BLOOD BY AUTOMATED COUNT: 13.1 % (ref 10–15)
GLUCOSE SERPL-MCNC: 72 MG/DL (ref 70–99)
GLUCOSE UR STRIP-MCNC: NEGATIVE MG/DL
HCG SERPL QL: NEGATIVE
HCT VFR BLD AUTO: 44.9 % (ref 35–47)
HGB BLD-MCNC: 14.3 G/DL (ref 11.7–15.7)
HGB UR QL STRIP: NEGATIVE
IMM GRANULOCYTES # BLD: 0 10E3/UL
IMM GRANULOCYTES NFR BLD: 0 %
KETONES UR STRIP-MCNC: NEGATIVE MG/DL
LEUKOCYTE ESTERASE UR QL STRIP: ABNORMAL
LIPASE SERPL-CCNC: 33 U/L (ref 13–60)
LYMPHOCYTES # BLD AUTO: 2.8 10E3/UL (ref 0.8–5.3)
LYMPHOCYTES NFR BLD AUTO: 27 %
MCH RBC QN AUTO: 30 PG (ref 26.5–33)
MCHC RBC AUTO-ENTMCNC: 31.8 G/DL (ref 31.5–36.5)
MCV RBC AUTO: 94 FL (ref 78–100)
MONOCYTES # BLD AUTO: 0.4 10E3/UL (ref 0–1.3)
MONOCYTES NFR BLD AUTO: 4 %
MUCOUS THREADS #/AREA URNS LPF: PRESENT /LPF
NEUTROPHILS # BLD AUTO: 6.7 10E3/UL (ref 1.6–8.3)
NEUTROPHILS NFR BLD AUTO: 67 %
NITRATE UR QL: NEGATIVE
NRBC # BLD AUTO: 0 10E3/UL
NRBC BLD AUTO-RTO: 0 /100
PH UR STRIP: 6.5 [PH] (ref 5–7)
PLATELET # BLD AUTO: 241 10E3/UL (ref 150–450)
POTASSIUM SERPL-SCNC: 4.2 MMOL/L (ref 3.4–5.3)
PROT SERPL-MCNC: 7.7 G/DL (ref 6.4–8.3)
RBC # BLD AUTO: 4.77 10E6/UL (ref 3.8–5.2)
RBC URINE: 1 /HPF
SODIUM SERPL-SCNC: 141 MMOL/L (ref 136–145)
SP GR UR STRIP: 1.03 (ref 1–1.03)
SQUAMOUS EPITHELIAL: 20 /HPF
UROBILINOGEN UR STRIP-MCNC: <2 MG/DL
WBC # BLD AUTO: 10 10E3/UL (ref 4–11)
WBC CLUMPS #/AREA URNS HPF: PRESENT /HPF
WBC URINE: 65 /HPF

## 2023-09-16 PROCEDURE — 96375 TX/PRO/DX INJ NEW DRUG ADDON: CPT | Mod: XU

## 2023-09-16 PROCEDURE — 250N000011 HC RX IP 250 OP 636: Mod: JZ | Performed by: EMERGENCY MEDICINE

## 2023-09-16 PROCEDURE — 88304 TISSUE EXAM BY PATHOLOGIST: CPT | Mod: TC | Performed by: SURGERY

## 2023-09-16 PROCEDURE — 999N000141 HC STATISTIC PRE-PROCEDURE NURSING ASSESSMENT: Performed by: SURGERY

## 2023-09-16 PROCEDURE — 99222 1ST HOSP IP/OBS MODERATE 55: CPT | Mod: 57 | Performed by: SURGERY

## 2023-09-16 PROCEDURE — 250N000009 HC RX 250: Performed by: STUDENT IN AN ORGANIZED HEALTH CARE EDUCATION/TRAINING PROGRAM

## 2023-09-16 PROCEDURE — 250N000025 HC SEVOFLURANE, PER MIN: Performed by: SURGERY

## 2023-09-16 PROCEDURE — 250N000011 HC RX IP 250 OP 636: Performed by: STUDENT IN AN ORGANIZED HEALTH CARE EDUCATION/TRAINING PROGRAM

## 2023-09-16 PROCEDURE — 76856 US EXAM PELVIC COMPLETE: CPT

## 2023-09-16 PROCEDURE — 258N000003 HC RX IP 258 OP 636: Performed by: EMERGENCY MEDICINE

## 2023-09-16 PROCEDURE — 250N000011 HC RX IP 250 OP 636: Performed by: SURGERY

## 2023-09-16 PROCEDURE — 250N000009 HC RX 250: Performed by: ANESTHESIOLOGY

## 2023-09-16 PROCEDURE — 272N000001 HC OR GENERAL SUPPLY STERILE: Performed by: SURGERY

## 2023-09-16 PROCEDURE — 84703 CHORIONIC GONADOTROPIN ASSAY: CPT | Performed by: EMERGENCY MEDICINE

## 2023-09-16 PROCEDURE — 87086 URINE CULTURE/COLONY COUNT: CPT | Performed by: EMERGENCY MEDICINE

## 2023-09-16 PROCEDURE — 370N000017 HC ANESTHESIA TECHNICAL FEE, PER MIN: Performed by: SURGERY

## 2023-09-16 PROCEDURE — 710N000009 HC RECOVERY PHASE 1, LEVEL 1, PER MIN: Performed by: SURGERY

## 2023-09-16 PROCEDURE — G0378 HOSPITAL OBSERVATION PER HR: HCPCS

## 2023-09-16 PROCEDURE — 360N000076 HC SURGERY LEVEL 3, PER MIN: Performed by: SURGERY

## 2023-09-16 PROCEDURE — 83690 ASSAY OF LIPASE: CPT | Performed by: EMERGENCY MEDICINE

## 2023-09-16 PROCEDURE — 81003 URINALYSIS AUTO W/O SCOPE: CPT | Performed by: EMERGENCY MEDICINE

## 2023-09-16 PROCEDURE — 710N000012 HC RECOVERY PHASE 2, PER MINUTE: Performed by: SURGERY

## 2023-09-16 PROCEDURE — 85025 COMPLETE CBC W/AUTO DIFF WBC: CPT | Performed by: EMERGENCY MEDICINE

## 2023-09-16 PROCEDURE — 76705 ECHO EXAM OF ABDOMEN: CPT

## 2023-09-16 PROCEDURE — 74177 CT ABD & PELVIS W/CONTRAST: CPT

## 2023-09-16 PROCEDURE — 250N000011 HC RX IP 250 OP 636: Mod: JZ | Performed by: ANESTHESIOLOGY

## 2023-09-16 PROCEDURE — 96374 THER/PROPH/DIAG INJ IV PUSH: CPT

## 2023-09-16 PROCEDURE — 47562 LAPAROSCOPIC CHOLECYSTECTOMY: CPT | Performed by: SURGERY

## 2023-09-16 PROCEDURE — 82248 BILIRUBIN DIRECT: CPT | Performed by: EMERGENCY MEDICINE

## 2023-09-16 PROCEDURE — 250N000013 HC RX MED GY IP 250 OP 250 PS 637: Performed by: SURGERY

## 2023-09-16 PROCEDURE — 36415 COLL VENOUS BLD VENIPUNCTURE: CPT | Performed by: EMERGENCY MEDICINE

## 2023-09-16 PROCEDURE — 99285 EMERGENCY DEPT VISIT HI MDM: CPT | Mod: 25

## 2023-09-16 PROCEDURE — 258N000003 HC RX IP 258 OP 636: Performed by: ANESTHESIOLOGY

## 2023-09-16 PROCEDURE — 250N000011 HC RX IP 250 OP 636: Performed by: ANESTHESIOLOGY

## 2023-09-16 RX ORDER — SODIUM CHLORIDE, SODIUM LACTATE, POTASSIUM CHLORIDE, CALCIUM CHLORIDE 600; 310; 30; 20 MG/100ML; MG/100ML; MG/100ML; MG/100ML
INJECTION, SOLUTION INTRAVENOUS CONTINUOUS
Status: DISCONTINUED | OUTPATIENT
Start: 2023-09-16 | End: 2023-09-16 | Stop reason: HOSPADM

## 2023-09-16 RX ORDER — HYDROCODONE BITARTRATE AND ACETAMINOPHEN 5; 325 MG/1; MG/1
1-2 TABLET ORAL EVERY 4 HOURS PRN
Qty: 15 TABLET | Refills: 0 | Status: SHIPPED | OUTPATIENT
Start: 2023-09-16

## 2023-09-16 RX ORDER — LIDOCAINE HYDROCHLORIDE 10 MG/ML
INJECTION, SOLUTION INFILTRATION; PERINEURAL PRN
Status: DISCONTINUED | OUTPATIENT
Start: 2023-09-16 | End: 2023-09-16

## 2023-09-16 RX ORDER — DOCUSATE SODIUM 100 MG/1
100 CAPSULE, LIQUID FILLED ORAL 2 TIMES DAILY
Qty: 30 CAPSULE | Refills: 0 | Status: SHIPPED | OUTPATIENT
Start: 2023-09-16 | End: 2023-09-16

## 2023-09-16 RX ORDER — BUPIVACAINE HYDROCHLORIDE 2.5 MG/ML
INJECTION, SOLUTION INFILTRATION; PERINEURAL PRN
Status: DISCONTINUED | OUTPATIENT
Start: 2023-09-16 | End: 2023-09-16 | Stop reason: HOSPADM

## 2023-09-16 RX ORDER — ONDANSETRON 2 MG/ML
INJECTION INTRAMUSCULAR; INTRAVENOUS PRN
Status: DISCONTINUED | OUTPATIENT
Start: 2023-09-16 | End: 2023-09-16

## 2023-09-16 RX ORDER — CEFAZOLIN SODIUM 2 G/100ML
2 INJECTION, SOLUTION INTRAVENOUS ONCE
Status: DISCONTINUED | OUTPATIENT
Start: 2023-09-16 | End: 2023-09-16

## 2023-09-16 RX ORDER — ONDANSETRON 2 MG/ML
4 INJECTION INTRAMUSCULAR; INTRAVENOUS EVERY 30 MIN PRN
Status: DISCONTINUED | OUTPATIENT
Start: 2023-09-16 | End: 2023-09-16 | Stop reason: HOSPADM

## 2023-09-16 RX ORDER — CEFAZOLIN SODIUM/WATER 2 G/20 ML
2 SYRINGE (ML) INTRAVENOUS SEE ADMIN INSTRUCTIONS
Status: DISCONTINUED | OUTPATIENT
Start: 2023-09-16 | End: 2023-09-16 | Stop reason: HOSPADM

## 2023-09-16 RX ORDER — FENTANYL CITRATE 50 UG/ML
50 INJECTION, SOLUTION INTRAMUSCULAR; INTRAVENOUS EVERY 5 MIN PRN
Status: DISCONTINUED | OUTPATIENT
Start: 2023-09-16 | End: 2023-09-16 | Stop reason: HOSPADM

## 2023-09-16 RX ORDER — FENTANYL CITRATE 50 UG/ML
50 INJECTION, SOLUTION INTRAMUSCULAR; INTRAVENOUS ONCE
Status: COMPLETED | OUTPATIENT
Start: 2023-09-16 | End: 2023-09-16

## 2023-09-16 RX ORDER — IOPAMIDOL 755 MG/ML
90 INJECTION, SOLUTION INTRAVASCULAR ONCE
Status: COMPLETED | OUTPATIENT
Start: 2023-09-16 | End: 2023-09-16

## 2023-09-16 RX ORDER — ONDANSETRON 4 MG/1
4 TABLET, ORALLY DISINTEGRATING ORAL EVERY 30 MIN PRN
Status: DISCONTINUED | OUTPATIENT
Start: 2023-09-16 | End: 2023-09-16 | Stop reason: HOSPADM

## 2023-09-16 RX ORDER — ONDANSETRON 2 MG/ML
4 INJECTION INTRAMUSCULAR; INTRAVENOUS ONCE
Status: COMPLETED | OUTPATIENT
Start: 2023-09-16 | End: 2023-09-16

## 2023-09-16 RX ORDER — IBUPROFEN 200 MG
200-400 TABLET ORAL EVERY 6 HOURS PRN
COMMUNITY

## 2023-09-16 RX ORDER — HYDROCODONE BITARTRATE AND ACETAMINOPHEN 5; 325 MG/1; MG/1
1 TABLET ORAL
Status: COMPLETED | OUTPATIENT
Start: 2023-09-16 | End: 2023-09-16

## 2023-09-16 RX ORDER — FENTANYL CITRATE 50 UG/ML
25 INJECTION, SOLUTION INTRAMUSCULAR; INTRAVENOUS EVERY 5 MIN PRN
Status: DISCONTINUED | OUTPATIENT
Start: 2023-09-16 | End: 2023-09-16 | Stop reason: HOSPADM

## 2023-09-16 RX ORDER — TRAMADOL HYDROCHLORIDE 50 MG/1
50 TABLET ORAL EVERY 6 HOURS PRN
Qty: 10 TABLET | Refills: 0 | Status: SHIPPED | OUTPATIENT
Start: 2023-09-16 | End: 2023-09-16

## 2023-09-16 RX ORDER — LIDOCAINE 40 MG/G
CREAM TOPICAL
Status: DISCONTINUED | OUTPATIENT
Start: 2023-09-16 | End: 2023-09-16 | Stop reason: HOSPADM

## 2023-09-16 RX ORDER — DOCUSATE SODIUM 100 MG/1
100 CAPSULE, LIQUID FILLED ORAL 2 TIMES DAILY
Qty: 30 CAPSULE | Refills: 0 | Status: SHIPPED | OUTPATIENT
Start: 2023-09-16

## 2023-09-16 RX ORDER — PROPOFOL 10 MG/ML
INJECTION, EMULSION INTRAVENOUS CONTINUOUS PRN
Status: DISCONTINUED | OUTPATIENT
Start: 2023-09-16 | End: 2023-09-16

## 2023-09-16 RX ORDER — FENTANYL CITRATE 50 UG/ML
INJECTION, SOLUTION INTRAMUSCULAR; INTRAVENOUS PRN
Status: DISCONTINUED | OUTPATIENT
Start: 2023-09-16 | End: 2023-09-16

## 2023-09-16 RX ORDER — CEFAZOLIN SODIUM/WATER 2 G/20 ML
2 SYRINGE (ML) INTRAVENOUS
Status: DISCONTINUED | OUTPATIENT
Start: 2023-09-16 | End: 2023-09-16 | Stop reason: HOSPADM

## 2023-09-16 RX ORDER — DEXAMETHASONE SODIUM PHOSPHATE 10 MG/ML
INJECTION, SOLUTION INTRAMUSCULAR; INTRAVENOUS PRN
Status: DISCONTINUED | OUTPATIENT
Start: 2023-09-16 | End: 2023-09-16

## 2023-09-16 RX ORDER — PROPOFOL 10 MG/ML
INJECTION, EMULSION INTRAVENOUS PRN
Status: DISCONTINUED | OUTPATIENT
Start: 2023-09-16 | End: 2023-09-16

## 2023-09-16 RX ORDER — PIPERACILLIN SODIUM, TAZOBACTAM SODIUM 3; .375 G/15ML; G/15ML
3.38 INJECTION, POWDER, LYOPHILIZED, FOR SOLUTION INTRAVENOUS ONCE
Status: COMPLETED | OUTPATIENT
Start: 2023-09-16 | End: 2023-09-16

## 2023-09-16 RX ADMIN — Medication 2 G: at 18:09

## 2023-09-16 RX ADMIN — LIDOCAINE HYDROCHLORIDE 50 ML: 10 INJECTION, SOLUTION INFILTRATION; PERINEURAL at 18:00

## 2023-09-16 RX ADMIN — FENTANYL CITRATE 50 MCG: 50 INJECTION, SOLUTION INTRAMUSCULAR; INTRAVENOUS at 19:11

## 2023-09-16 RX ADMIN — HYDROMORPHONE HYDROCHLORIDE 1 MG: 1 INJECTION, SOLUTION INTRAMUSCULAR; INTRAVENOUS; SUBCUTANEOUS at 18:16

## 2023-09-16 RX ADMIN — HYDROCODONE BITARTRATE AND ACETAMINOPHEN 1 TABLET: 5; 325 TABLET ORAL at 20:40

## 2023-09-16 RX ADMIN — FENTANYL CITRATE 50 MCG: 50 INJECTION, SOLUTION INTRAMUSCULAR; INTRAVENOUS at 17:57

## 2023-09-16 RX ADMIN — ONDANSETRON 4 MG: 2 INJECTION INTRAMUSCULAR; INTRAVENOUS at 16:48

## 2023-09-16 RX ADMIN — MIDAZOLAM 2 MG: 1 INJECTION INTRAMUSCULAR; INTRAVENOUS at 17:53

## 2023-09-16 RX ADMIN — FENTANYL CITRATE 50 MCG: 50 INJECTION, SOLUTION INTRAMUSCULAR; INTRAVENOUS at 16:48

## 2023-09-16 RX ADMIN — PIPERACILLIN AND TAZOBACTAM 3.38 G: 3; .375 INJECTION, POWDER, LYOPHILIZED, FOR SOLUTION INTRAVENOUS at 16:48

## 2023-09-16 RX ADMIN — ONDANSETRON 4 MG: 2 INJECTION INTRAMUSCULAR; INTRAVENOUS at 18:45

## 2023-09-16 RX ADMIN — DEXAMETHASONE SODIUM PHOSPHATE 10 MG: 10 INJECTION, SOLUTION INTRAMUSCULAR; INTRAVENOUS at 18:10

## 2023-09-16 RX ADMIN — SUGAMMADEX 200 MG: 100 INJECTION, SOLUTION INTRAVENOUS at 18:45

## 2023-09-16 RX ADMIN — PROPOFOL 25 MCG/KG/MIN: 10 INJECTION, EMULSION INTRAVENOUS at 18:11

## 2023-09-16 RX ADMIN — ROCURONIUM BROMIDE 50 MG: 50 INJECTION, SOLUTION INTRAVENOUS at 18:01

## 2023-09-16 RX ADMIN — FENTANYL CITRATE 50 MCG: 50 INJECTION, SOLUTION INTRAMUSCULAR; INTRAVENOUS at 18:00

## 2023-09-16 RX ADMIN — SODIUM CHLORIDE, POTASSIUM CHLORIDE, SODIUM LACTATE AND CALCIUM CHLORIDE 1000 ML: 600; 310; 30; 20 INJECTION, SOLUTION INTRAVENOUS at 16:48

## 2023-09-16 RX ADMIN — PROPOFOL 200 MG: 10 INJECTION, EMULSION INTRAVENOUS at 18:00

## 2023-09-16 RX ADMIN — SODIUM CHLORIDE, POTASSIUM CHLORIDE, SODIUM LACTATE AND CALCIUM CHLORIDE: 600; 310; 30; 20 INJECTION, SOLUTION INTRAVENOUS at 17:54

## 2023-09-16 RX ADMIN — PROCHLORPERAZINE EDISYLATE 5 MG: 5 INJECTION INTRAMUSCULAR; INTRAVENOUS at 18:57

## 2023-09-16 RX ADMIN — FENTANYL CITRATE 50 MCG: 50 INJECTION, SOLUTION INTRAMUSCULAR; INTRAVENOUS at 19:06

## 2023-09-16 RX ADMIN — IOPAMIDOL 90 ML: 755 INJECTION, SOLUTION INTRAVENOUS at 14:15

## 2023-09-16 ASSESSMENT — ACTIVITIES OF DAILY LIVING (ADL)
ADLS_ACUITY_SCORE: 35
ADLS_ACUITY_SCORE: 35
ADLS_ACUITY_SCORE: 20
ADLS_ACUITY_SCORE: 20
ADLS_ACUITY_SCORE: 35

## 2023-09-16 ASSESSMENT — ENCOUNTER SYMPTOMS
DIARRHEA: 0
COUGH: 0
ABDOMINAL PAIN: 1
DYSURIA: 0
CONSTIPATION: 0
FEVER: 0
VOMITING: 0
SHORTNESS OF BREATH: 0

## 2023-09-16 NOTE — ED TRIAGE NOTES
Patient presents here with sharp pain to her lower abdominal area and right upper quadrant pain with palpation that has occurred last night. No diarrhea or vomiting. Slight nausea. She is 9 weeks post partum.

## 2023-09-16 NOTE — ANESTHESIA PROCEDURE NOTES
Airway       Patient location during procedure: OR       Procedure Start/Stop Times: 9/16/2023 6:04 PM  Staff -        CRNA: Chandana Coker APRN CRNA       Performed By: CRNA  Consent for Airway        Urgency: emergent  Indications and Patient Condition       Indications for airway management: paulino-procedural        Final Airway Details       Final airway type: endotracheal airway       Successful airway: ETT - single  Endotracheal Airway Details        ETT size (mm): 7.5       Successful intubation technique: direct laryngoscopy       DL Blade Type: MAC 3       Grade View of Cords: 1       Adjucts: stylet       Position: Right       Measured from: lips       Secured at (cm): 22       Bite block used: None    Post intubation assessment        Placement verified by: capnometry, equal breath sounds and chest rise        Number of attempts at approach: 1       Number of other approaches attempted: 0       Secured with: silk tape       Ease of procedure: easy       Dentition: Intact    Medication(s) Administered   Medication Administration Time: 9/16/2023 6:04 PM

## 2023-09-16 NOTE — PHARMACY-ADMISSION MEDICATION HISTORY
Pharmacist Admission Medication History    Admission medication history is complete. The information provided in this note is only as accurate as the sources available at the time of the update.    Medication reconciliation/reorder completed by provider prior to medication history? No    Information Source(s): Patient and CareEverywhere/SureScripts via in-person    Pertinent Information: post-partum - has stopped supplements from prior    Changes made to PTA medication list:  Added: ibu  Deleted: apap, acyclovir, tums, iron ,omeprazole, prenatal, sennaS  Changed: None    Medication Affordability:  Not including over the counter (OTC) medications, was there a time in the past 3 months when you did not take your medications as prescribed because of cost?: No    Allergies reviewed with patient and updates made in EHR: yes    Medication History Completed By: Aleja Izaguirre RP 9/16/2023 5:13 PM    Prior to Admission medications    Medication Sig Last Dose Taking? Auth Provider Long Term End Date   ibuprofen (ADVIL/MOTRIN) 200 MG tablet Take 200-400 mg by mouth every 6 hours as needed for pain Past Week at prn Yes Unknown, Entered By History

## 2023-09-16 NOTE — ANESTHESIA PREPROCEDURE EVALUATION
Anesthesia Pre-Procedure Evaluation    Patient: Sobeida Fountain   MRN: [id number] : 1990        Procedure : Procedure(s):  CHOLECYSTECTOMY, LAPAROSCOPIC          Past Medical History:   Diagnosis Date    Depressive disorder     both during and after      Past Surgical History:   Procedure Laterality Date     SECTION N/A 2020    Procedure:  SECTION;  Surgeon: Olinda Packer MD;  Location: [facility] L+[facility]      No Known Allergies   Social History     Tobacco Use    Smoking status: Not on file    Smokeless tobacco: Not on file   Substance Use Topics    Alcohol use: Not Currently      Wt Readings from Last 1 Encounters:   23 101.2 kg (223 lb)        Anesthesia Evaluation   Pt has had prior anesthetic.     No history of anesthetic complications       ROS/MED HX  ENT/Pulmonary:  - neg pulmonary ROS     Neurologic:  - neg neurologic ROS     Cardiovascular:  - neg cardiovascular ROS     METS/Exercise Tolerance:     Hematologic:  - neg hematologic  ROS  (-) anemia   Musculoskeletal:  - neg musculoskeletal ROS     GI/Hepatic:     (+)          cholecystitis/cholelithiasis (s/f lap zena),       (-) GERD   Renal/Genitourinary:       Endo:     (+)               Obesity (BMI 36),       Psychiatric/Substance Use:       Infectious Disease:  - neg infectious disease ROS     Malignancy:       Other:            Physical Exam    Airway        Mallampati: I   TM distance: > 3 FB   Neck ROM: full   Mouth opening: > 3 cm    Respiratory Devices and Support         Dental       (+) Completely normal teeth      Cardiovascular   cardiovascular exam normal          Pulmonary   pulmonary exam normal                OUTSIDE LABS:  CBC:   Lab Results   Component Value Date    WBC 10.0 2023    WBC 10.6 2022    HGB 14.3 2023    HGB 13.2 2022    HCT 44.9 2023    HCT 40.5 2022     2023     2022     BMP:   Lab Results   Component Value Date      09/16/2023     07/29/2022    POTASSIUM 4.2 09/16/2023    POTASSIUM 4.4 07/29/2022    CHLORIDE 106 09/16/2023    CHLORIDE 107 07/29/2022    CO2 25 09/16/2023    CO2 24 07/29/2022    BUN 13.5 09/16/2023    BUN 11 07/29/2022    CR 0.74 09/16/2023    CR 0.82 07/29/2022    GLC 72 09/16/2023    GLC 85 07/29/2022     COAGS: No results found for: PTT, INR, FIBR  POC:   Lab Results   Component Value Date    BGM 78 02/26/2020    HCGS Negative 09/16/2023     HEPATIC:   Lab Results   Component Value Date    ALBUMIN 4.4 09/16/2023    PROTTOTAL 7.7 09/16/2023    ALT 43 09/16/2023    AST 45 09/16/2023    ALKPHOS 68 09/16/2023    BILITOTAL 0.2 09/16/2023     OTHER:   Lab Results   Component Value Date    SHADY 9.0 09/16/2023    LIPASE 33 09/16/2023       Anesthesia Plan    ASA Status:  2, emergent    NPO Status:  ELEVATED Aspiration Risk/Unknown    Anesthesia Type: General.     - Airway: ETT   Induction: Intravenous, RSI, Propofol.   Maintenance: Balanced.        Consents    Anesthesia Plan(s) and associated risks, benefits, and realistic alternatives discussed. Questions answered and patient/representative(s) expressed understanding.     - Discussed:     - Discussed with:  Patient            Postoperative Care    Pain management: Multi-modal analgesia, IV analgesics.   PONV prophylaxis: Ondansetron (or other 5HT-3), Dexamethasone or Solumedrol, Background Propofol Infusion     Comments:    Other Comments: GETA - RSI  Decadron 10, Zofran 4, Propofol gtt for antiemesis  Toradol 15mg at case closure if cleared with surgeon             Radha Carreon MD

## 2023-09-16 NOTE — OP NOTE
Name:  Sobeida Fountain  PCP:  No Ref-Primary, Physician  Procedure Date:  9/16/2023      Procedure(s):  CHOLECYSTECTOMY, LAPAROSCOPIC    Pre-Procedure Diagnosis:  Acute cholecystitis [K81.0]     Post-Procedure Diagnosis:    cholecystitis    Surgeon(s):  Markell Watters DO    Circulator: Jolanta Francois RN  Scrub Person: Rufina Callaway    Anesthesia Type:  GET      Findings:  cholecystitis    Operative Report:    The patient was taken to the operating room and placed in a supine position.  Endotracheal intubation was performed.  SCDs were placed on bilateral lower extremities.  Antibiotics were given prior to skin incision.  The abdomen was prepped and draped in a sterile fashion.    I began the first portion of the procedure by injecting quarter percent Marcaine with epinephrine into the supraumbilical position.  I then made a 5 mm transverse incision above the umbilicus and established a pneumoperitoneum using a Veress needle technique.  Once the pneumoperitoneum was established,I placed a 5 mm trocar with a 5 mm 30  camera into the abdomen.  The surrounding structures were scrutinized for any injuries upon entry.  I did not find any. I placed an 11 mm trocar in the subxiphoid position as well as 2 right upper quadrant 5 mm trochars under direct visualization.  All trochars were placed after local anesthetic was injected to the fascial layers.      I then had my assistant retract the gallbladder cephalad and I persisted to dissect out the cystic duct and cystic artery in the standard fashion using blunt dissection and Bovie electrocautery.  Once I obtained a critical view I then placed several 10 mm titanium clips on the cystic duct 3 distally and one at the gallbladder and infundibulum junction.  I then placed 2 clips across the cystic artery.  Next I transected both the cystic duct and cystic artery with sharp dissection and removed the gallbladder off the gallbladder fossa using Bovie electrocautery.  I  then placed the gallbladder into an Endo Catch bag and brought it out through the subxiphoid port site incision.  Next I achieved hemostasis using Bovie electrocautery, surgicell powder and scrutinized the surgical area for any ongoing bleeding.  None was found.  I then closed the 11 mm subxiphoid port site incision using a Lori needle and an 0 Vicryl suture.  Once this was complete I removed all trochars and desufflated the abdomen.  I then injected local anesthetic and all fascial layers and reapproximated the skin edges of all 4 trocar sites with 4-0 Monocryl subcuticular sutures.  The wounds were then cleaned and covered with dry sterile dressings.  All sponge counts and needle counts were correct at the end of the procedure.    Estimated Blood Loss:   25cc    Specimens:    ID Type Source Tests Collected by Time Destination   1 : GALLBLADDER Tissue Gallbladder SURGICAL PATHOLOGY EXAM Markell Watters DO 9/16/2023  6:18 PM           Drains:        Complications:    None    Markell Watters DO

## 2023-09-16 NOTE — ED PROVIDER NOTES
EMERGENCY DEPARTMENT ENCOUNTER      NAME: Sobeida Fountain  AGE: 32 year old female  YOB: 1990  MRN: [id number]  EVALUATION DATE & TIME: 2023 11:51 AM    PCP: No Ref-Primary, Physician    ED PROVIDER: Maritza Hart M.D.        Chief Complaint   Patient presents with    Abdominal Pain         FINAL IMPRESSION:    1. RUQ abdominal pain    2. Lower abdominal pain            MEDICAL DECISION MAKIN year old female who is about 8 weeks postpartum and presents emergency department lower abdominal pain that is now progressing up towards her right upper quadrant.  Laboratories most notable for abnormal urinalysis, urine is quite contaminated.  Pelvic ultrasound unremarkable.  No signs for retained products.  CT scan shows questionable enlarged gallbladder and right upper quadrant ultrasound has been ordered.  Ultrasound result pending at this time.  Patient signed out at change of shift awaiting ultrasound results and disposition.    If ultrasound is negative then would consider treating for possible UTI/pyelonephritis.  If ultrasound confirms cholecystitis then patient should be treated with Zosyn and admitted to the surgical service for surgical intervention.      ED COURSE:  12:13 PM  I met with the patient to gather history and perform my exam. ED course and treatment discussed.  Pt hopes to drive home today if everything looks good and therefore declines any narcotic pain medication in the ER.  Offered her IV Toradol which she declines at this time as well.  She will let us know if she would desire something to control her pain.    3:02 PM  CT scan and ultrasound overall unremarkable.  They are recommending right upper quadrant ultrasound.  LFTs and lipase within normal limits.  Patient continues to decline any pain medication.  She understands the plan to do right upper quadrant ultrasound and agrees.    4:04 PM  Patient signed out at change of shift awaiting right upper quadrant  "ultrasound results.  LFTs and lipase completely normal but ultrasound ordered due to some changes seen on CT scan which may suggest acute cholecystitis.  Patient does not appear toxic or septic.  Disposition will be dependent upon these results.    CONSULTANTS:  none        MEDICATIONS GIVEN IN THE EMERGENCY:  Medications   iopamidol (ISOVUE-370) solution 90 mL (90 mLs Intravenous $Given 9/16/23 9746)           NEW PRESCRIPTIONS STARTED AT TODAY'S ER VISIT     Medication List      There are no discharge medications for this visit.             CONDITION:  stable        DISPOSITION:  pending         =================================================================  =================================================================  TRIAGE ASSESSMENT:  Patient presents here with sharp pain to her lower abdominal area and right upper quadrant pain with palpation that has occurred last night. No diarrhea or vomiting. Slight nausea. She is 9 weeks post partum.      ED Triage Vitals [09/16/23 1144]   Enc Vitals Group      /70      Pulse 89      Resp 16      Temp 98  F (36.7  C)      Temp src Oral      SpO2 100 %      Weight 101.2 kg (223 lb)      Height 1.676 m (5' 6\")          ================================================================  ================================================================    HPI    Patient information was obtained from: patient    Use of Intrepreter: N/A     Sobeida Fountain is a 32 year old female with history of vaginal delivery about 8-9 weeks ago, who presents to the ER with complaints of abdominal pain.    States the pain started across the lower abdomen feeling like bloating but now is increasing to include the right upper quadrant.  She states the pain is most severe in the right upper quadrant.  She otherwise denies any fevers, cough, chest pain, shortness of breath vomiting or diarrhea.  Denies any unusual vaginal bleeding or discharge.    She is not currently " breast-feeding.      REVIEW OF SYSTEMS  Review of Systems   Constitutional:  Negative for fever.   Respiratory:  Negative for cough and shortness of breath.    Cardiovascular:  Negative for chest pain.   Gastrointestinal:  Positive for abdominal pain. Negative for constipation, diarrhea and vomiting.   Genitourinary:  Negative for dysuria, vaginal bleeding and vaginal discharge.   All other systems reviewed and are negative.        PAST MEDICAL HISTORY:  Past Medical History:   Diagnosis Date    Depressive disorder     both during and after         PAST SURGICAL HISTORY:  Past Surgical History:   Procedure Laterality Date     SECTION N/A 2020    Procedure:  SECTION;  Surgeon: Olinda Packer MD;  Location:  L+D         CURRENT MEDICATIONS:    Prior to Admission medications    Medication Sig Start Date End Date Taking? Authorizing Provider   acetaminophen (TYLENOL) 325 MG tablet Take 2 tablets (650 mg) by mouth every 6 hours as needed for mild pain Start after Delivery. 20   Sobeida Diaz MD   acyclovir (ZOVIRAX) 400 MG tablet Take 1 tablet (400 mg) by mouth 2 times daily 20   Leni Beck MD   calcium carbonate (TUMS) 500 MG chewable tablet Take 1 chew tab by mouth 2 times daily    Reported, Patient   ferrous sulfate (FEROSUL) 325 (65 Fe) MG tablet Take 325 mg by mouth daily (with breakfast)    Reported, Patient   ibuprofen (ADVIL/MOTRIN) 600 MG tablet Take 1 tablet (600 mg) by mouth every 6 hours as needed for moderate pain Start after delivery 20   Sobeida Diaz MD   omeprazole 20 MG tablet Take 20 mg by mouth daily    Reported, Patient   oxyCODONE (ROXICODONE) 5 MG tablet Take 1-2 tablets (5-10 mg) by mouth every 4 hours as needed for moderate to severe pain 20   Maya Lozano MD   Prenatal Vit-Fe Fumarate-FA (PRENATAL VITAMIN PO) Take 2 tablets by mouth daily    Reported, Patient   senna-docusate (SENOKOT-S/PERICOLACE) 8.6-50  "MG tablet Take 1 tablet by mouth daily Start after delivery. 4/18/20   Sobeida Diaz MD         ALLERGIES:  No Known Allergies      FAMILY HISTORY:  Family History   Family history unknown: Yes         SOCIAL HISTORY:  Social History     Socioeconomic History    Marital status: Single   Substance and Sexual Activity    Alcohol use: Not Currently    Drug use: Not Currently    Sexual activity: Yes     Partners: Male     Birth control/protection: None         VITALS:  Patient Vitals for the past 24 hrs:   BP Temp Temp src Pulse Resp SpO2 Height Weight   09/16/23 1515 -- -- -- 74 -- 92 % -- --   09/16/23 1500 111/62 -- -- 71 -- 92 % -- --   09/16/23 1144 129/70 98  F (36.7  C) Oral 89 16 100 % 1.676 m (5' 6\") 101.2 kg (223 lb)       Wt Readings from Last 3 Encounters:   09/16/23 101.2 kg (223 lb)   07/29/22 106.6 kg (235 lb)   04/17/20 113.4 kg (250 lb)       Estimated Creatinine Clearance: 133.5 mL/min (based on SCr of 0.74 mg/dL).    PHYSICAL EXAM    Constitutional:  Well developed, Well nourished, NAD, GCS 15  HENT:  Normocephalic, Atraumatic, Bilateral external ears normal, Nose normal. Neck- Supple, No stridor.   Eyes:  PERRL, EOMI, Conjunctiva normal, No discharge.  Respiratory:  Normal breath sounds, No respiratory distress, No wheezing, Speaks full sentences easily. No cough.   Cardiovascular:  Normal heart rate, Regular rhythm, No rubs, No gallops. Chest wall nontender.   GI:  No excessive obesity.  Bowel sounds normal, Soft, +mild lower abd and RUQ tenderness, No masses, No flank tenderness. No rebound or guarding.   : deferred    Musculoskeletal:  No cyanosis, No clubbing. Good range of motion in all major joints. No major deformities noted.   Integument:  Warm, Dry, No erythema, No rash.  No petechiae.   Neurologic:  Alert & oriented x 3  Psychiatric:  Affect normal, Cooperative         LAB:  All pertinent labs reviewed and interpreted.  Recent Results (from the past 24 hour(s))   UA with " Microscopic reflex to Culture    Collection Time: 09/16/23 12:32 PM    Specimen: Urine, Clean Catch   Result Value Ref Range    Color Urine Yellow Colorless, Straw, Light Yellow, Yellow    Appearance Urine Turbid (A) Clear    Glucose Urine Negative Negative mg/dL    Bilirubin Urine Negative Negative    Ketones Urine Negative Negative mg/dL    Specific Gravity Urine 1.027 1.001 - 1.030    Blood Urine Negative Negative    pH Urine 6.5 5.0 - 7.0    Protein Albumin Urine 10 (A) Negative mg/dL    Urobilinogen Urine <2.0 <2.0 mg/dL    Nitrite Urine Negative Negative    Leukocyte Esterase Urine 500 Sony/uL (A) Negative    Bacteria Urine Few (A) None Seen /HPF    WBC Clumps Urine Present (A) None Seen /HPF    Mucus Urine Present (A) None Seen /LPF    RBC Urine 1 <=2 /HPF    WBC Urine 65 (H) <=5 /HPF    Squamous Epithelials Urine 20 (H) <=1 /HPF   HCG QUALitative pregnancy (blood)    Collection Time: 09/16/23 12:42 PM   Result Value Ref Range    hCG Serum Qualitative Negative Negative   Basic metabolic panel    Collection Time: 09/16/23 12:42 PM   Result Value Ref Range    Sodium 141 136 - 145 mmol/L    Potassium 4.2 3.4 - 5.3 mmol/L    Chloride 106 98 - 107 mmol/L    Carbon Dioxide (CO2) 25 22 - 29 mmol/L    Anion Gap 10 7 - 15 mmol/L    Urea Nitrogen 13.5 6.0 - 20.0 mg/dL    Creatinine 0.74 0.51 - 0.95 mg/dL    Calcium 9.0 8.6 - 10.0 mg/dL    Glucose 72 70 - 99 mg/dL    GFR Estimate >90 >60 mL/min/1.73m2   Hepatic function panel    Collection Time: 09/16/23 12:42 PM   Result Value Ref Range    Protein Total 7.7 6.4 - 8.3 g/dL    Albumin 4.4 3.5 - 5.2 g/dL    Bilirubin Total 0.2 <=1.2 mg/dL    Alkaline Phosphatase 68 35 - 104 U/L    AST 45 0 - 45 U/L    ALT 43 0 - 50 U/L    Bilirubin Direct <0.20 0.00 - 0.30 mg/dL   Lipase    Collection Time: 09/16/23 12:42 PM   Result Value Ref Range    Lipase 33 13 - 60 U/L   CBC with platelets and differential    Collection Time: 09/16/23 12:42 PM   Result Value Ref Range    WBC Count  10.0 4.0 - 11.0 10e3/uL    RBC Count 4.77 3.80 - 5.20 10e6/uL    Hemoglobin 14.3 11.7 - 15.7 g/dL    Hematocrit 44.9 35.0 - 47.0 %    MCV 94 78 - 100 fL    MCH 30.0 26.5 - 33.0 pg    MCHC 31.8 31.5 - 36.5 g/dL    RDW 13.1 10.0 - 15.0 %    Platelet Count 241 150 - 450 10e3/uL    % Neutrophils 67 %    % Lymphocytes 27 %    % Monocytes 4 %    % Eosinophils 1 %    % Basophils 1 %    % Immature Granulocytes 0 %    NRBCs per 100 WBC 0 <1 /100    Absolute Neutrophils 6.7 1.6 - 8.3 10e3/uL    Absolute Lymphocytes 2.8 0.8 - 5.3 10e3/uL    Absolute Monocytes 0.4 0.0 - 1.3 10e3/uL    Absolute Eosinophils 0.1 0.0 - 0.7 10e3/uL    Absolute Basophils 0.1 0.0 - 0.2 10e3/uL    Absolute Immature Granulocytes 0.0 <=0.4 10e3/uL    Absolute NRBCs 0.0 10e3/uL       No results found for: [facility]        RADIOLOGY:  Reviewed all pertinent imaging. Please see official radiology report.    CT Abdomen Pelvis w Contrast   Final Result   IMPRESSION:    1. Cholelithiasis with mild gallbladder wall thickening, nonspecific, can be seen with acute cholecystitis. Recommend further evaluation with right upper quadrant abdominal ultrasound.      US Pelvis Cmplt w Transvag & Doppler LmtPel Duplex Limited   Final Result   IMPRESSION:     Normal pelvic ultrasound.               Abdomen US, limited (RUQ only)    (Results Pending)         EKG:    none      PROCEDURES:  none    Medical Decision Making    History:  Supplemental history from: Documented in chart, if applicable  External Record(s) reviewed: Documented in chart, if applicable.    Work Up:  Chart documentation includes differential considered and any EKGs or imaging independently interpreted by provider, where specified.  In additional to work up documented, I considered the following work up: Documented in chart, if applicable.    External consultation:  Discussion of management with another provider: Documented in chart, if applicable    Complicating factors:  Care impacted by chronic illness:  N/A  Care affected by social determinants of health: N/A    Disposition considerations:  pending US results      Maritza Hart M.D. [facility]  Emergency Medicine and Medical Toxicology  Formerly [facility] EMERGENCY DEPARTMENT  [address] [place] [id number]  [phone]  Dept: [phone]           Maritza Hart MD  09/16/23 8552

## 2023-09-16 NOTE — H&P
HPI  32 year old year old female who I have been consulted by No ref. provider found for evaluation of Abdominal Pain  32-year-old female with a 1 day history of epigastric and right upper quadrant pain which turned into a sharp pain prompting her to visit the emergency room.  She has had nausea but no emesis.  Denies any fevers or chills.  She has had similar complaints in the past but states that this pain was somewhat different with the sharp pain.  She recently gave birth approximately 8 weeks ago to a child but denies any biliary colic symptoms during her pregnancy.  She is healthy otherwise.    Allergies:Patient has no known allergies.    Past Medical History:   Diagnosis Date    Depressive disorder     both during and after       Past Surgical History:   Procedure Laterality Date     SECTION N/A 2020    Procedure:  SECTION;  Surgeon: Olinda Packer MD;  Location:  L+D         CURRENT MEDS:    Current Facility-Administered Medications:     ceFAZolin Sodium (ANCEF) injection 2 g, 2 g, Intravenous, Pre-Op/Pre-procedure x 1 dose, aMrkell Watters, DO    ceFAZolin Sodium (ANCEF) injection 2 g, 2 g, Intravenous, See Admin Instructions, Markell Watters, DO    lactated ringers infusion, , Intravenous, Continuous, Radha Berg MD    lidocaine (LMX4) cream, , Topical, Q1H PRN, Radha Berg MD    lidocaine 1 % 0.1-1 mL, 0.1-1 mL, Other, Q1H PRN, Radha eBrg MD    sodium chloride (PF) 0.9% PF flush 3 mL, 3 mL, Intracatheter, Q8H, Radha Berg MD    sodium chloride (PF) 0.9% PF flush 3 mL, 3 mL, Intracatheter, q1 min prn, Radha Berg MD    FAMHX-reviewed and noncontributory     reports that she does not currently use alcohol. She reports that she does not currently use drugs.    Review of Systems:  The 12 point review of systems  is within normal limits except for as mentioned above in the HPI.  General ROS: No  "complaints or constitutional symptoms  Ophthalmic ROS: No complaints of visual changes  Skin: No complaints or symptoms   Endocrine: No complaints or symptoms  Hematologic/Lymphatic: No symptoms or complaints  Psychiatric: No symptoms or complaints  Respiratory ROS: no cough, shortness of breath, or wheezing  Cardiovascular ROS: no chest pain or dyspnea on exertion  Gastrointestinal ROS: As per HPI  Genito-Urinary ROS: no dysuria, trouble voiding, or hematuria  Musculoskeletal ROS: no joint or muscle pain  Neurological ROS: no TIA or stroke symptoms      EXAM:  /59   Pulse 64   Temp 97.9  F (36.6  C) (Temporal)   Resp 16   Ht 1.676 m (5' 6\")   Wt 101.2 kg (223 lb)   LMP 08/18/2023   SpO2 97%   BMI 35.99 kg/m    GENERAL: Well developed female, No acute distress, pleasant and conversant   EYES: Pupils equal, round and reactive, no scleral icterus  ABDOMEN: Soft, mild tenderness palpation in the right upper quadrant with no [place] sign  Lungs-clear to auscultation bilaterally  Cardiac-regular rate and rhythm  SKIN: Pink, warm and dry, no obvious rashes or lesions   NEURO:No focal deficits, ambulatory  MUSCULOSKELETAL:No obvious deformities, no swelling, normal appearing      LABS:  Lab Results   Component Value Date    WBC 10.0 09/16/2023    WBC 9.9 04/17/2020    HGB 14.3 09/16/2023    HGB 10.8 04/18/2020    HCT 44.9 09/16/2023    HCT 37.0 04/17/2020    MCV 94 09/16/2023    MCV 89 04/17/2020     09/16/2023     04/17/2020     INR/Prothrombin Time  Recent Labs   Lab 09/16/23  1242      CO2 25   BUN 13.5     Lab Results   Component Value Date    ALT 43 09/16/2023    AST 45 09/16/2023    ALKPHOS 68 09/16/2023       IMAGES:   Relevant images were reviewed and discussed with the patient.  Notable findings were: Ultrasound and CT scan images reviewed demonstrate acute cholecystitis    Assessment/Plan:   Sobeida Fountain is a 32 year old female with acute cholecystitis and cholelithiasis.  " The pathophysiology of cholelithiasis and cholecystitis was explained to the patient as were the surgical versus nonoperative management strategies.  The risk of both of these options were discussed in detail.  Patient understands everything that was discussed and is consented to proceed with laparoscopic cholecystectomy.  She will discharge home from the PACU today.      Sanjeev Watters D.O. [facility]  [phone]

## 2023-09-17 NOTE — ANESTHESIA CARE TRANSFER NOTE
Patient: Sobeida Fountain    Procedure: Procedure(s):  CHOLECYSTECTOMY, LAPAROSCOPIC       Diagnosis: Acute cholecystitis [K81.0]  Diagnosis Additional Information: No value filed.    Anesthesia Type:   General     Note:    Oropharynx: oropharynx clear of all foreign objects  Level of Consciousness: drowsy  Oxygen Supplementation: blow-by O2    Independent Airway: airway patency satisfactory and stable        Patient transferred to: PACU    Handoff Report: Identifed the Patient, Identified the Reponsible Provider, Reviewed the pertinent medical history, Discussed the surgical course, Reviewed Intra-OP anesthesia mangement and issues during anesthesia, Set expectations for post-procedure period and Allowed opportunity for questions and acknowledgement of understanding      Vitals:  Vitals Value Taken Time   /79 09/16/23 2000   Temp 37.1  C (98.8  F) 09/16/23 1945   Pulse 83 09/16/23 2004   Resp 19 09/16/23 2000   SpO2 98 % 09/16/23 2004   Vitals shown include unvalidated device data.    Electronically Signed By: ERIC Bhardwaj CRNA  September 16, 2023  8:07 PM

## 2023-09-17 NOTE — ANESTHESIA POSTPROCEDURE EVALUATION
Patient: Sobeida Fountain    Procedure: Procedure(s):  CHOLECYSTECTOMY, LAPAROSCOPIC       Anesthesia Type:  General    Note:  Disposition: Outpatient   Postop Pain Control: Uneventful            Sign Out: Well controlled pain   PONV: No   Neuro/Psych: Uneventful            Sign Out: Acceptable/Baseline neuro status   Airway/Respiratory: Uneventful            Sign Out: Acceptable/Baseline resp. status   CV/Hemodynamics: Uneventful            Sign Out: Acceptable CV status; No obvious hypovolemia; No obvious fluid overload   Other NRE: NONE   DID A NON-ROUTINE EVENT OCCUR? No           Last vitals:  Vitals Value Taken Time   /79 09/16/23 2000   Temp 37.1  C (98.8  F) 09/16/23 1945   Pulse 83 09/16/23 2004   Resp 19 09/16/23 2000   SpO2 98 % 09/16/23 2004   Vitals shown include unvalidated device data.    Electronically Signed By: Mathieu Culp MD  September 16, 2023  8:23 PM

## 2023-09-18 LAB — BACTERIA UR CULT: NORMAL

## 2023-09-19 LAB
PATH REPORT.COMMENTS IMP SPEC: NORMAL
PATH REPORT.COMMENTS IMP SPEC: NORMAL
PATH REPORT.FINAL DX SPEC: NORMAL
PATH REPORT.GROSS SPEC: NORMAL
PATH REPORT.MICROSCOPIC SPEC OTHER STN: NORMAL
PATH REPORT.RELEVANT HX SPEC: NORMAL
PHOTO IMAGE: NORMAL

## 2023-09-19 PROCEDURE — 88304 TISSUE EXAM BY PATHOLOGIST: CPT | Mod: 26 | Performed by: PATHOLOGY

## 2023-10-02 ENCOUNTER — HOSPITAL ENCOUNTER (EMERGENCY)
Facility: HOSPITAL | Age: 33
Discharge: HOME | End: 2023-10-03
Attending: EMERGENCY MEDICINE
Payer: COMMERCIAL

## 2023-10-02 DIAGNOSIS — R11.0 NAUSEA: ICD-10-CM

## 2023-10-02 DIAGNOSIS — R10.13 ABDOMINAL PAIN, EPIGASTRIC: ICD-10-CM

## 2023-10-02 LAB
ALBUMIN SERPL BCG-MCNC: 4.5 G/DL (ref 3.5–5.2)
ALP SERPL-CCNC: 76 U/L (ref 35–104)
ALT SERPL W P-5'-P-CCNC: 11 U/L (ref 0–50)
ANION GAP SERPL CALCULATED.3IONS-SCNC: 13 MMOL/L (ref 7–15)
AST SERPL W P-5'-P-CCNC: 24 U/L (ref 0–45)
BILIRUB DIRECT SERPL-MCNC: <0.2 MG/DL (ref 0–0.3)
BILIRUB SERPL-MCNC: 0.5 MG/DL
BUN SERPL-MCNC: 12.2 MG/DL (ref 6–20)
CALCIUM SERPL-MCNC: 9 MG/DL (ref 8.6–10)
CHLORIDE SERPL-SCNC: 103 MMOL/L (ref 98–107)
CREAT SERPL-MCNC: 0.83 MG/DL (ref 0.51–0.95)
DEPRECATED HCO3 PLAS-SCNC: 24 MMOL/L (ref 22–29)
EGFRCR SERPLBLD CKD-EPI 2021: >90 ML/MIN/1.73M2
GLUCOSE SERPL-MCNC: 84 MG/DL (ref 70–99)
LACTATE SERPL-SCNC: 1.3 MMOL/L (ref 0.7–2)
LIPASE SERPL-CCNC: 28 U/L (ref 13–60)
POTASSIUM SERPL-SCNC: 3.6 MMOL/L (ref 3.4–5.3)
PROT SERPL-MCNC: 7.8 G/DL (ref 6.4–8.3)
SODIUM SERPL-SCNC: 140 MMOL/L (ref 135–145)

## 2023-10-02 PROCEDURE — 82310 ASSAY OF CALCIUM: CPT | Performed by: EMERGENCY MEDICINE

## 2023-10-02 PROCEDURE — 96375 TX/PRO/DX INJ NEW DRUG ADDON: CPT

## 2023-10-02 PROCEDURE — 83605 ASSAY OF LACTIC ACID: CPT | Performed by: EMERGENCY MEDICINE

## 2023-10-02 PROCEDURE — 82248 BILIRUBIN DIRECT: CPT | Performed by: EMERGENCY MEDICINE

## 2023-10-02 PROCEDURE — 96361 HYDRATE IV INFUSION ADD-ON: CPT

## 2023-10-02 PROCEDURE — 83690 ASSAY OF LIPASE: CPT | Performed by: EMERGENCY MEDICINE

## 2023-10-02 PROCEDURE — 96374 THER/PROPH/DIAG INJ IV PUSH: CPT | Mod: 59

## 2023-10-02 PROCEDURE — 87040 BLOOD CULTURE FOR BACTERIA: CPT | Performed by: EMERGENCY MEDICINE

## 2023-10-02 PROCEDURE — 258N000003 HC RX IP 258 OP 636: Performed by: EMERGENCY MEDICINE

## 2023-10-02 PROCEDURE — 99285 EMERGENCY DEPT VISIT HI MDM: CPT | Mod: 25

## 2023-10-02 PROCEDURE — 80053 COMPREHEN METABOLIC PANEL: CPT | Performed by: EMERGENCY MEDICINE

## 2023-10-02 PROCEDURE — 36415 COLL VENOUS BLD VENIPUNCTURE: CPT | Performed by: EMERGENCY MEDICINE

## 2023-10-02 PROCEDURE — 250N000011 HC RX IP 250 OP 636: Mod: JZ | Performed by: EMERGENCY MEDICINE

## 2023-10-02 RX ORDER — ONDANSETRON 2 MG/ML
8 INJECTION INTRAMUSCULAR; INTRAVENOUS ONCE
Status: COMPLETED | OUTPATIENT
Start: 2023-10-02 | End: 2023-10-02

## 2023-10-02 RX ORDER — KETOROLAC TROMETHAMINE 15 MG/ML
15 INJECTION, SOLUTION INTRAMUSCULAR; INTRAVENOUS ONCE
Status: COMPLETED | OUTPATIENT
Start: 2023-10-02 | End: 2023-10-02

## 2023-10-02 RX ADMIN — ONDANSETRON 8 MG: 2 INJECTION INTRAMUSCULAR; INTRAVENOUS at 23:07

## 2023-10-02 RX ADMIN — KETOROLAC TROMETHAMINE 15 MG: 15 INJECTION INTRAMUSCULAR; INTRAVENOUS at 23:08

## 2023-10-02 RX ADMIN — SODIUM CHLORIDE 1000 ML: 9 INJECTION, SOLUTION INTRAVENOUS at 23:06

## 2023-10-02 ASSESSMENT — ACTIVITIES OF DAILY LIVING (ADL): ADLS_ACUITY_SCORE: 35

## 2023-10-03 ENCOUNTER — APPOINTMENT (OUTPATIENT)
Dept: CT IMAGING | Facility: HOSPITAL | Age: 33
End: 2023-10-03
Attending: EMERGENCY MEDICINE
Payer: COMMERCIAL

## 2023-10-03 VITALS
DIASTOLIC BLOOD PRESSURE: 55 MMHG | SYSTOLIC BLOOD PRESSURE: 111 MMHG | WEIGHT: 220 LBS | RESPIRATION RATE: 18 BRPM | BODY MASS INDEX: 34.53 KG/M2 | HEART RATE: 86 BPM | TEMPERATURE: 98.3 F | HEIGHT: 67 IN | OXYGEN SATURATION: 95 %

## 2023-10-03 LAB
ERYTHROCYTE [DISTWIDTH] IN BLOOD BY AUTOMATED COUNT: 12 % (ref 10–15)
HCT VFR BLD AUTO: 37.1 % (ref 35–47)
HGB BLD-MCNC: 12.1 G/DL (ref 11.7–15.7)
MCH RBC QN AUTO: 29.9 PG (ref 26.5–33)
MCHC RBC AUTO-ENTMCNC: 32.6 G/DL (ref 31.5–36.5)
MCV RBC AUTO: 92 FL (ref 78–100)
PLATELET # BLD AUTO: 264 10E3/UL (ref 150–450)
RBC # BLD AUTO: 4.05 10E6/UL (ref 3.8–5.2)
WBC # BLD AUTO: 9.4 10E3/UL (ref 4–11)

## 2023-10-03 PROCEDURE — 250N000011 HC RX IP 250 OP 636: Mod: JZ | Performed by: EMERGENCY MEDICINE

## 2023-10-03 PROCEDURE — 96375 TX/PRO/DX INJ NEW DRUG ADDON: CPT

## 2023-10-03 PROCEDURE — 85027 COMPLETE CBC AUTOMATED: CPT | Performed by: EMERGENCY MEDICINE

## 2023-10-03 PROCEDURE — 74177 CT ABD & PELVIS W/CONTRAST: CPT

## 2023-10-03 PROCEDURE — 36415 COLL VENOUS BLD VENIPUNCTURE: CPT | Performed by: EMERGENCY MEDICINE

## 2023-10-03 RX ORDER — IOPAMIDOL 755 MG/ML
75 INJECTION, SOLUTION INTRAVASCULAR ONCE
Status: COMPLETED | OUTPATIENT
Start: 2023-10-03 | End: 2023-10-03

## 2023-10-03 RX ORDER — METOCLOPRAMIDE 5 MG/1
5 TABLET ORAL 3 TIMES DAILY PRN
Qty: 18 TABLET | Refills: 0 | Status: SHIPPED | OUTPATIENT
Start: 2023-10-03

## 2023-10-03 RX ORDER — METOCLOPRAMIDE HYDROCHLORIDE 5 MG/ML
5 INJECTION INTRAMUSCULAR; INTRAVENOUS ONCE
Status: COMPLETED | OUTPATIENT
Start: 2023-10-03 | End: 2023-10-03

## 2023-10-03 RX ADMIN — METOCLOPRAMIDE 5 MG: 5 INJECTION, SOLUTION INTRAMUSCULAR; INTRAVENOUS at 00:26

## 2023-10-03 RX ADMIN — IOPAMIDOL 75 ML: 755 INJECTION, SOLUTION INTRAVENOUS at 00:27

## 2023-10-03 ASSESSMENT — ACTIVITIES OF DAILY LIVING (ADL): ADLS_ACUITY_SCORE: 35

## 2023-10-03 NOTE — ED TRIAGE NOTES
"Pt here with c/o abd pain, n/v/d. Pt had gallbaldder removed 2 weeks ago here and \"hasn't felt right since\". Pain is worse today. Unable to keep food/fluids down.      Triage Assessment       Row Name 10/02/23 2393       Triage Assessment (Adult)    Airway WDL WDL       Respiratory WDL    Respiratory WDL WDL       Cardiac WDL    Cardiac WDL WDL                    "

## 2023-10-03 NOTE — DISCHARGE INSTRUCTIONS
Please reach out to your surgery clinic, the phone number should be in your postsurgical folder.  Let them know you are having some abdominal pain you are seen here in the emergency department and make sure you have a follow-up appointment scheduled within a couple of weeks for reevaluation.    If you develop a fever, or the pain comes worse, please come back for reevaluation although this is unlikely based on the very reassuring and normal lab work and CT scan here.

## 2023-10-03 NOTE — ED PROVIDER NOTES
EMERGENCY DEPARTMENT ENCOUNTER      NAME: Sobeida Fountain  AGE: 32 year old female  YOB: 1990  MRN: [id number]  EVALUATION DATE & TIME: 10/2/2023  9:54 PM    PCP: No Ref-Primary, Physician    ED PROVIDER: Gabe Mack M.D.      Chief Complaint   Patient presents with    Abdominal Pain    Nausea, Vomiting, & Diarrhea    Post-op Problem         FINAL IMPRESSION:  1. Abdominal pain, epigastric    2. Nausea          ED COURSE & MEDICAL DECISION MAKING:    Pertinent Labs & Imaging studies reviewed below.  All EKGs below represent my independent interpretation.     10:20 PM Met with the patient and performed my initial exam.     ED Course as of 10/03/23 0110   Mon Oct 02, 2023   2228 Patient is a 32-year-old woman, 3 months postpartum, 2 weeks postop laparoscopic cholecystectomy who presents with persistent pain since that procedure, getting worse with time.  Nausea, vomiting and diarrhea have accompanied this.  No fever or chills.  On arrival here she has a blood pressure of 116/66 with normal temperature and heart rate.  Nonseptic appearing.  She has tenderness to deep palpation in the epigastrium and right upper quadrant, but abdomen overall soft and benign.  Differential includes GERD, postop infection/abscess or bile leak.  IV fluids, analgesia, antiemetics ordered.  CT scan ordered.  Blood cultures ordered.   2317 Lactic Acid: 1.3   2343 Hepatic function panel  WNL   2343 Lipase: 28   2343 Basic metabolic panel   Tue Oct 03, 2023   0039 WBC: 9.4   0109 CT Abdomen Pelvis w Contrast  1.  No acute abnormality.  2.  Typical postoperative changes of cholecystectomy. No evidence of bile leak or abscess.   0109 I updated the patient after imaging.  She did not get much relief from Zofran and Toradol but did feel better after the Reglan.  Her stomach did look quite full and I wonder if she is having little bit of issues with motility and gastric emptying after the procedure.  Plan to send home with  Reglan.  I recommended that she follow-up with her surgical team, she will call them tomorrow.  We discussed return precautions as fever or increased pain.         Medical Decision Making    History:  Supplemental history from: Documented in chart, if applicable  External Record(s) reviewed: Documented in chart, if applicable.    Work Up:  Chart documentation includes differential considered and any EKGs or imaging independently interpreted by provider, where specified.  In additional to work up documented, I considered the following work up: Documented in chart, if applicable.    External consultation:  Discussion of management with another provider: Documented in chart, if applicable    Complicating factors:  Care impacted by chronic illness: N/A  Care affected by social determinants of health: N/A    Disposition considerations: Discharge. I prescribed additional prescription strength medication(s) as charted. N/A.      At the conclusion of the encounter I discussed the results of all of the tests and the disposition. The questions were answered. The patient or family acknowledged understanding and was agreeable with the care plan.       MEDICATIONS GIVEN IN THE EMERGENCY:  Medications   sodium chloride 0.9% BOLUS 1,000 mL (0 mLs Intravenous Stopped 10/3/23 0030)   ondansetron (ZOFRAN) injection 8 mg (8 mg Intravenous $Given 10/2/23 2307)   ketorolac (TORADOL) injection 15 mg (15 mg Intravenous $Given 10/2/23 2308)   metoclopramide (REGLAN) injection 5 mg (5 mg Intravenous $Given 10/3/23 0026)   iopamidol (ISOVUE-370) solution 75 mL (75 mLs Intravenous $Given 10/3/23 0027)         NEW PRESCRIPTIONS STARTED AT TODAY'S ER VISIT  New Prescriptions    METOCLOPRAMIDE (REGLAN) 5 MG TABLET    Take 1 tablet (5 mg) by mouth 3 times daily as needed (nausea)          =================================================================    HPI    Patient information was obtained from: Patient    Use of : N/A       "  Sobeida Fountain is a 32 year old female with a pertinent history of laparoscopic cholecystectomy who presents to this ED for evaluation of abdominal pain.     Patient had her gallbladder removed roughly 2 weeks ago, and notes that she has been experiencing constant stomach pain that has progressively worsened overtime. She states she is \"unable to keep fluid and food down\". She has diarrhea and has been vomiting. Endorses stomach pain when she eats or drinks anything. She has been feeling shaky, and has tremors. She hasn't taken any medications for the pain. Endorses right, lower abdominal pain. She has an acid reflux, notes she typically has acid reflux when she Is pregnant, but reports she isn't currently pregnant. She has a 3 month old child, and she reports she doesn't breastfeed currently. No other reported complaints or concerns at this time.       VITALS:  /55 (BP Location: Left arm, Patient Position: Semi-Rodriguez's, Cuff Size: Adult Large)   Pulse 86   Temp 98.3  F (36.8  C) (Oral)   Resp 18   Ht 1.702 m (5' 7\")   Wt 99.8 kg (220 lb)   LMP 09/28/2023   SpO2 95%   BMI 34.46 kg/m      PHYSICAL EXAM    Constitutional: Well developed, well nourished. Uncomfortable appearing.  HENT: Normocephalic, atraumatic, mucous membranes moist, nose normal. Neck- Supple, gross ROM intact.   Eyes: Pupils mid-range, conjunctiva without injection, no discharge.   Respiratory: Clear to auscultation bilaterally, no respiratory distress, no wheezing, speaks full sentences easily. No cough.  Cardiovascular: Normal heart rate, regular rhythm, no murmurs.   GI: Tenderness in epigastric region and right upper quadrant tenderness, soft. No guarding.   Well healed laparoscopic surgical wounds.   Musculoskeletal: Moving all 4 extremities intentionally and without pain. No obvious deformity.  Skin: Warm, dry, no rash.  Neurologic: Alert & oriented x 3, cranial nerves grossly intact.  Psychiatric: Affect normal, " cooperative.        I, Mayuri Moser am serving as a scribe to document services personally performed by Dr. Gabe Mack based on my observation and the provider's statements to me. I, Gabe Mack MD attest that Mayuri Moser is acting in a scribe capacity, has observed my performance of the services and has documented them in accordance with my direction.    Gabe Mack M.D.  Emergency Medicine  [facility] EMERGENCY DEPARTMENT  [address] [place] [id number]  [phone]  Dept: [phone]       Gabe Mack MD  10/03/23 0110

## 2023-10-03 NOTE — ED NOTES
"Pt states some increased dizziness and a \"flushed\" feeling after giving IV Toradol and Zofran as ordered on MAR with continuous fluids infusing. VSS after re-check, continuing to monitor.   "

## 2023-10-08 LAB
BACTERIA BLD CULT: NO GROWTH
BACTERIA BLD CULT: NO GROWTH

## 2024-01-17 ENCOUNTER — HOSPITAL ENCOUNTER (EMERGENCY)
Facility: HOSPITAL | Age: 34
Discharge: HOME | End: 2024-01-17
Attending: EMERGENCY MEDICINE | Admitting: EMERGENCY MEDICINE
Payer: COMMERCIAL

## 2024-01-17 VITALS
OXYGEN SATURATION: 97 % | WEIGHT: 220 LBS | TEMPERATURE: 98.4 F | HEART RATE: 89 BPM | BODY MASS INDEX: 35.36 KG/M2 | RESPIRATION RATE: 16 BRPM | DIASTOLIC BLOOD PRESSURE: 65 MMHG | SYSTOLIC BLOOD PRESSURE: 130 MMHG | HEIGHT: 66 IN

## 2024-01-17 DIAGNOSIS — K12.2 UVULITIS: ICD-10-CM

## 2024-01-17 DIAGNOSIS — J02.9 PHARYNGITIS, UNSPECIFIED ETIOLOGY: ICD-10-CM

## 2024-01-17 LAB — GROUP A STREP BY PCR: NOT DETECTED

## 2024-01-17 PROCEDURE — 250N000011 HC RX IP 250 OP 636: Performed by: EMERGENCY MEDICINE

## 2024-01-17 PROCEDURE — 87651 STREP A DNA AMP PROBE: CPT | Performed by: EMERGENCY MEDICINE

## 2024-01-17 PROCEDURE — 99284 EMERGENCY DEPT VISIT MOD MDM: CPT

## 2024-01-17 PROCEDURE — 96372 THER/PROPH/DIAG INJ SC/IM: CPT | Performed by: EMERGENCY MEDICINE

## 2024-01-17 RX ORDER — CETIRIZINE HYDROCHLORIDE 10 MG/1
10 TABLET ORAL DAILY
Qty: 21 TABLET | Refills: 0 | Status: SHIPPED | OUTPATIENT
Start: 2024-01-17

## 2024-01-17 RX ORDER — DEXAMETHASONE SODIUM PHOSPHATE 4 MG/ML
10 INJECTION, SOLUTION INTRA-ARTICULAR; INTRALESIONAL; INTRAMUSCULAR; INTRAVENOUS; SOFT TISSUE ONCE
Status: COMPLETED | OUTPATIENT
Start: 2024-01-17 | End: 2024-01-17

## 2024-01-17 RX ORDER — FLUTICASONE PROPIONATE 50 MCG
1 SPRAY, SUSPENSION (ML) NASAL DAILY
Qty: 16 G | Refills: 0 | Status: SHIPPED | OUTPATIENT
Start: 2024-01-17

## 2024-01-17 RX ADMIN — DEXAMETHASONE SODIUM PHOSPHATE 10 MG: 4 INJECTION, SOLUTION INTRA-ARTICULAR; INTRALESIONAL; INTRAMUSCULAR; INTRAVENOUS; SOFT TISSUE at 07:36

## 2024-01-17 NOTE — DISCHARGE INSTRUCTIONS
The steroid injection you received should help in the next few hours and last a day or 2 with symptomatic relief.  Take OTC ibuprofen/tylenol for discomfort as well. Take flonase and zyrtec daily to help with inflammation/secretions.  Follow up with primary clinic for re-evaluation.

## 2024-01-17 NOTE — ED PROVIDER NOTES
Emergency Department Encounter     Evaluation Date & Time:   1/17/2024  7:19 AM    CHIEF COMPLAINT:  Pharyngitis      Triage Note:Pt arrives ambulatory to triage. Reports sore throat x2 weeks. Denies n/v or SOB. Has kids at home who may have been sick.                 ED COURSE & MEDICAL DECISION MAKING:     ED Course as of 01/17/24 0742   Wed Jan 17, 2024   0720 I introduced myself to the patient, obtained patient history, performed a physical exam, and discussed plan for ED workup including potential diagnostic laboratory/imaging studies and interventions.    0741 Pt requesting to leave and [facility]  Given my low suspicion for strep throat, do not feel she needs to wait. Rx for flonase/zyrtec, instructions on cares provided. Pt agreeable.     Pt here with approximately 2 weeks of sore throat and some congestion, reports 6 kids at home with some similar symptoms at various times. She states they were all negative for strep.  Pt noticed her uvula was particularly swollen/red today, so she came in.  She was specifically worried about it getting swollen to a degree where it could harm her.  She appears to likely have some degree of uvulitis, but nothing to really suggest strep throat and certainly no signs of abscess or more nefarious pathology.  No indication for serum labs or CT of neck. Will get strep swab, treat with IM decadron for symptom relief, home with flonase/zyrtec, instructions on expected course, follow up and return precautions provided.    Medical Decision Making    History:  Supplemental history from: Documented in chart  External Record(s) reviewed: Documented in chart and Other: 10/2/23 visit [facility] ED    Work Up:  Chart documentation includes differential considered and any EKGs or imaging independently interpreted by provider, where specified.  In additional to work up documented, I considered the following work up: Documented in chart, if  applicable.    External consultation:  Discussion of management with another provider: Documented in chart, if applicable    Complicating factors:  Care impacted by chronic illness: N/A  Care affected by social determinants of health: N/A    Disposition considerations: Discharge. I prescribed additional prescription strength medication(s) as charted. See documentation for any additional details.      At the conclusion of the encounter I discussed the results of all the tests and the disposition. The questions were answered. The patient or family acknowledged understanding and was agreeable with the care plan.      MEDICATIONS GIVEN IN THE EMERGENCY DEPARTMENT:  Medications   dexAMETHasone (DECADRON) injection 10 mg (10 mg Intramuscular $Given 1/17/24 0736)       NEW PRESCRIPTIONS STARTED AT TODAY'S ED VISIT:  New Prescriptions    CETIRIZINE (ZYRTEC) 10 MG TABLET    Take 1 tablet (10 mg) by mouth daily    FLUTICASONE (FLONASE) 50 MCG/ACT NASAL SPRAY    Spray 1 spray into both nostrils daily       HPI   HPI     Sobeida Fountain is a 33 year old female with a pertinent history of s/p laparoscopic cholecystectomy who presents to this ED via walk-in for evaluation of pharyngitis.     Per chart review: Patient visited [facility] ED on 10/2/23 (3 months ago) for evaluation of abdominal pain, nausea, vomiting, and diarrhea s/p laparoscopic cholecystectomy 2 weeks. Labs reassuring. CT abdomen pelvis unremarkable. Patient was discharged with metoclopramide HCl 5 mg 3x daily.    Patient reports sore throat for the past 2 weeks that was worse today when she woke up. She reports that her tonsils and uvula are swollen and erythematous. Patient endorses decreased oral intake today due to her sore throat. She endorses heavy drainage. Patient denies any fever, shortness of breath, or GI symptoms. Patient denies taking any OTC medications for her symptoms. She mentions that she has 6 kids who have all tested negative for strep.  "Patient denies any other medical conditions or daily medication use.     REVIEW OF SYSTEMS:  See HPI      Medical History     Past Medical History:   Diagnosis Date    Depressive disorder        Past Surgical History:   Procedure Laterality Date     SECTION N/A 2020    Procedure:  SECTION;  Surgeon: Olinda Packer MD;  Location:  L+D    LAPAROSCOPIC CHOLECYSTECTOMY N/A 2023    Procedure: CHOLECYSTECTOMY, LAPAROSCOPIC;  Surgeon: Markell Watters DO;  Location: [facility] Main OR       Family History   Family history unknown: Yes       Social History     Substance Use Topics    Alcohol use: Not Currently    Drug use: Not Currently       cetirizine (ZYRTEC) 10 MG tablet  fluticasone (FLONASE) 50 MCG/ACT nasal spray  docusate sodium (COLACE) 100 MG capsule  docusate sodium (COLACE) 100 MG capsule  HYDROcodone-acetaminophen (NORCO) 5-325 MG tablet  ibuprofen (ADVIL/MOTRIN) 200 MG tablet  metoclopramide (REGLAN) 5 MG tablet        Physical Exam     Vitals:  /65   Pulse 89   Temp 98.4  F (36.9  C) (Oral)   Resp 16   Ht 1.676 m (5' 6\")   Wt 99.8 kg (220 lb)   LMP 2024   SpO2 97%   BMI 35.51 kg/m      PHYSICAL EXAM:   Physical Exam  Vitals and nursing note reviewed.   Constitutional:       General: She is not in acute distress.     Appearance: Normal appearance.   HENT:      Head: Normocephalic and atraumatic.      Nose: Nose normal.      Mouth/Throat:      Mouth: Mucous membranes are moist.      Pharynx: No oropharyngeal exudate.      Tonsils: No tonsillar exudate or tonsillar abscesses.      Comments: Uvula moderately swollen and erythematous. No trismus. Handling secretions well.   Eyes:      Extraocular Movements: Extraocular movements intact.   Cardiovascular:      Rate and Rhythm: Normal rate and regular rhythm.      Pulses: Normal pulses.           Radial pulses are 2+ on the right side and 2+ on the left side.        Dorsalis pedis pulses are 2+ on the right " side and 2+ on the left side.   Pulmonary:      Effort: Pulmonary effort is normal.   Skin:     Findings: No rash.   Neurological:      General: No focal deficit present.      Mental Status: She is alert. Mental status is at baseline.      Comments: Fluent speech   Psychiatric:         Mood and Affect: Mood normal.         Behavior: Behavior normal.         Results     LAB:  All pertinent labs reviewed and interpreted  Labs Ordered and Resulted from Time of ED Arrival to Time of ED Departure - No data to display    RADIOLOGY:  No orders to display                ECG:  none    PROCEDURES:  Procedures:  none      FINAL IMPRESSION:    ICD-10-CM    1. Uvulitis  K12.2       2. Pharyngitis, unspecified etiology  J02.9           0 minutes of critical care time      I, Teri Torres, am serving as a scribe to document services personally performed by Dr. Terrell Em, based on my observations and the provider's statements to me. I, Terrell Em, DO attest that Teri Torres is acting in a scribe capacity, has observed my performance of the services and has documented them in accordance with my direction.      Terrell Em DO  Emergency Medicine  [facility] EMERGENCY DEPARTMENT  1/17/2024  7:26 AM      Terrell Em MD  01/17/24 0742

## 2024-01-17 NOTE — ED TRIAGE NOTES
Pt arrives ambulatory to triage. Reports sore throat x2 weeks. Denies n/v or SOB. Has kids at home who may have been sick.     Triage Assessment (Adult)       Row Name 01/17/24 0717          Triage Assessment    Airway WDL WDL        Respiratory WDL    Respiratory WDL WDL        Skin Circulation/Temperature WDL    Skin Circulation/Temperature WDL WDL        Cardiac WDL    Cardiac WDL WDL        Peripheral/Neurovascular WDL    Peripheral Neurovascular WDL WDL        Cognitive/Neuro/Behavioral WDL    Cognitive/Neuro/Behavioral WDL WDL

## 2024-02-25 ENCOUNTER — HEALTH MAINTENANCE LETTER (OUTPATIENT)
Age: 34
End: 2024-02-25

## 2024-07-18 ENCOUNTER — LAB REQUISITION (OUTPATIENT)
Dept: LAB | Facility: CLINIC | Age: 34
End: 2024-07-18
Payer: COMMERCIAL

## 2024-07-18 DIAGNOSIS — M79.89 OTHER SPECIFIED SOFT TISSUE DISORDERS: ICD-10-CM

## 2024-07-18 LAB
ALBUMIN SERPL BCG-MCNC: 4.3 G/DL (ref 3.5–5.2)
ALP SERPL-CCNC: 75 U/L (ref 40–150)
ALT SERPL W P-5'-P-CCNC: 8 U/L (ref 0–50)
ANION GAP SERPL CALCULATED.3IONS-SCNC: 10 MMOL/L (ref 7–15)
AST SERPL W P-5'-P-CCNC: 15 U/L (ref 0–45)
BILIRUB SERPL-MCNC: 0.3 MG/DL
BUN SERPL-MCNC: 9.9 MG/DL (ref 6–20)
CALCIUM SERPL-MCNC: 8.9 MG/DL (ref 8.8–10.4)
CHLORIDE SERPL-SCNC: 106 MMOL/L (ref 98–107)
CREAT SERPL-MCNC: 0.74 MG/DL (ref 0.51–0.95)
EGFRCR SERPLBLD CKD-EPI 2021: >90 ML/MIN/1.73M2
GLUCOSE SERPL-MCNC: 90 MG/DL (ref 70–99)
HCO3 SERPL-SCNC: 24 MMOL/L (ref 22–29)
POTASSIUM SERPL-SCNC: 3.7 MMOL/L (ref 3.4–5.3)
PROT SERPL-MCNC: 7.7 G/DL (ref 6.4–8.3)
SODIUM SERPL-SCNC: 140 MMOL/L (ref 135–145)

## 2024-07-18 PROCEDURE — 80053 COMPREHEN METABOLIC PANEL: CPT | Mod: ORL | Performed by: NURSE PRACTITIONER

## 2025-01-30 ENCOUNTER — HOSPITAL ENCOUNTER (EMERGENCY)
Facility: HOSPITAL | Age: 35
Discharge: HOME | End: 2025-01-30
Attending: EMERGENCY MEDICINE | Admitting: EMERGENCY MEDICINE
Payer: COMMERCIAL

## 2025-01-30 VITALS
DIASTOLIC BLOOD PRESSURE: 75 MMHG | HEIGHT: 66 IN | BODY MASS INDEX: 36.16 KG/M2 | WEIGHT: 225 LBS | HEART RATE: 76 BPM | SYSTOLIC BLOOD PRESSURE: 126 MMHG | RESPIRATION RATE: 16 BRPM | OXYGEN SATURATION: 99 % | TEMPERATURE: 98 F

## 2025-01-30 DIAGNOSIS — T78.40XA ALLERGIC REACTION, INITIAL ENCOUNTER: ICD-10-CM

## 2025-01-30 PROCEDURE — 250N000013 HC RX MED GY IP 250 OP 250 PS 637: Performed by: EMERGENCY MEDICINE

## 2025-01-30 PROCEDURE — 250N000012 HC RX MED GY IP 250 OP 636 PS 637: Performed by: EMERGENCY MEDICINE

## 2025-01-30 PROCEDURE — 99283 EMERGENCY DEPT VISIT LOW MDM: CPT

## 2025-01-30 RX ORDER — FAMOTIDINE 20 MG/1
40 TABLET, FILM COATED ORAL ONCE
Status: COMPLETED | OUTPATIENT
Start: 2025-01-30 | End: 2025-01-30

## 2025-01-30 RX ORDER — EPINEPHRINE 0.3 MG/.3ML
0.3 INJECTION SUBCUTANEOUS
Qty: 1 EACH | Refills: 0 | Status: SHIPPED | OUTPATIENT
Start: 2025-01-30

## 2025-01-30 RX ADMIN — FAMOTIDINE 40 MG: 20 TABLET, FILM COATED ORAL at 18:45

## 2025-01-30 RX ADMIN — DEXAMETHASONE 10 MG: 2 TABLET ORAL at 18:45

## 2025-01-30 ASSESSMENT — COLUMBIA-SUICIDE SEVERITY RATING SCALE - C-SSRS
2. HAVE YOU ACTUALLY HAD ANY THOUGHTS OF KILLING YOURSELF IN THE PAST MONTH?: NO
6. HAVE YOU EVER DONE ANYTHING, STARTED TO DO ANYTHING, OR PREPARED TO DO ANYTHING TO END YOUR LIFE?: NO
1. IN THE PAST MONTH, HAVE YOU WISHED YOU WERE DEAD OR WISHED YOU COULD GO TO SLEEP AND NOT WAKE UP?: NO

## 2025-01-30 ASSESSMENT — ACTIVITIES OF DAILY LIVING (ADL)
ADLS_ACUITY_SCORE: 42
ADLS_ACUITY_SCORE: 42

## 2025-01-31 NOTE — ED TRIAGE NOTES
Patient presents to ED for possible allergic reaction.  Ate new meal about an hour ago and now feels like throat is tight.  Feels like swelling in back of throat.  Took benadryl about 45 minutes ago.    Triage Assessment (Adult)       Row Name 01/30/25 5285          Triage Assessment    Airway WDL WDL        Respiratory WDL    Respiratory WDL WDL        Skin Circulation/Temperature WDL    Skin Circulation/Temperature WDL WDL        Cardiac WDL    Cardiac WDL WDL        Peripheral/Neurovascular WDL    Peripheral Neurovascular WDL WDL        Cognitive/Neuro/Behavioral WDL    Cognitive/Neuro/Behavioral WDL WDL

## 2025-01-31 NOTE — ED PROVIDER NOTES
EMERGENCY DEPARTMENT ENCOUNTER      NAME: Sobeida Fountain  AGE: 34 year old female  YOB: 1990  MRN: [id number]  EVALUATION DATE & TIME: 1/30/2025  6:34 PM    PCP: No Ref-Primary, Physician    ED PROVIDER: Malka Vences MD    Chief Complaint   Patient presents with    Allergic Reaction         FINAL IMPRESSION:  1. Allergic reaction, initial encounter          ED COURSE & MEDICAL DECISION MAKING:    Pertinent Labs & Imaging studies reviewed. (See chart for details)  34 year old female with history of depression and unknown food allergies who presents to the Emergency Department for evaluation of possible allergic reaction after she was eating a new meal approximately 1 hour ago and feels like the throat is tight.  On examination has uvular edema but no swelling of the tongue, lips, respiratory distress.  Symptoms are consistent with mild allergic reaction.  She is already feeling slightly improved with Benadryl prior to arrival.  Was given Pepcid, Decadron here with continued improvement.  Counseled regarding the above, home with EpiPen and referral to allergy medicine per her request.      ED Course as of 01/30/25 1958   Thu Jan 30, 2025   1837   I met with the patient for initial interview and encounter. We discussed a plan for treatment and diagnostic interventions.     1957 Patient feels improved requesting discharge       Medical Decision Making  Obtained supplemental history:Supplemental history obtained?: No  Reviewed external records: Reviewed external and inpatient records  Care impacted by chronic illness:Documented in Chart  Did you consider but not order tests?: Work up considered but not performed and documented in chart, if applicable  Did you interpret images independently?: Independent interpretation of ECG and images noted in documentation, when applicable.  Consultation discussion with other provider:Did you involve another provider (consultant, , pharmacy, etc.)?:  "No  Discharge. I prescribed additional prescription strength medication(s) as charted. See documentation for any additional details.    MIPS: Not Applicable      At the conclusion of the encounter I discussed the results of all of the tests and the disposition. The questions were answered. The patient or family acknowledged understanding and was agreeable with the care plan.      MEDICATIONS GIVEN IN THE EMERGENCY:  Medications   famotidine (PEPCID) tablet 40 mg (40 mg Oral $Given 25)   dexAMETHasone (DECADRON) tablet 10 mg (10 mg Oral $Given 25)       NEW PRESCRIPTIONS STARTED AT TODAY'S ER VISIT  New Prescriptions    EPINEPHRINE (ANY BX GENERIC EQUIV) 0.3 MG/0.3ML INJECTION 2-PACK    Inject 0.3 mLs (0.3 mg) into the muscle once as needed for anaphylaxis. May repeat one time in 5-15 minutes if response to initial dose is inadequate.          =================================================================    HPI    Patient information was obtained from: the patient     Use of Intrepreter: N/A      Sobeida Fountain is a 34 year old female with pertinent medical history of depressive disorder who presents for evaluation of allergic reaction.     The patient reports she does not have a diagnosed allergy.     Today she was eating protein pasta with pea powder and after 3 bites, she felt her throat tightening and uvula swollen. No scratchy sensation. She took benadryl 10 mg and it improved her symptoms.  She states this is her second time having this dish. The first time she had it, there was a \"weird reaction.\"     No other complaints at this time.       PAST MEDICAL HISTORY:  Past Medical History:   Diagnosis Date    Depressive disorder     both during and after       PAST SURGICAL HISTORY:  Past Surgical History:   Procedure Laterality Date     SECTION N/A 2020    Procedure:  SECTION;  Surgeon: Olinda Packer MD;  Location: [facility] L[facility]D    LAPAROSCOPIC CHOLECYSTECTOMY " "N/A 9/16/2023    Procedure: CHOLECYSTECTOMY, LAPAROSCOPIC;  Surgeon: Markell Watters DO;  Location: [facility] Main OR       CURRENT MEDICATIONS:    Prior to Admission Medications   Prescriptions Last Dose Informant Patient Reported? Taking?   HYDROcodone-acetaminophen (NORCO) 5-325 MG tablet   No No   Sig: Take 1-2 tablets by mouth every 4 hours as needed for moderate to severe pain   cetirizine (ZYRTEC) 10 MG tablet   No No   Sig: Take 1 tablet (10 mg) by mouth daily   docusate sodium (COLACE) 100 MG capsule   No No   Sig: Take 1 capsule (100 mg) by mouth 2 times daily   docusate sodium (COLACE) 100 MG capsule   No No   Sig: Take 1 capsule (100 mg) by mouth 2 times daily   fluticasone (FLONASE) 50 MCG/ACT nasal spray   No No   Sig: Spray 1 spray into both nostrils daily   ibuprofen (ADVIL/MOTRIN) 200 MG tablet   Yes No   Sig: Take 200-400 mg by mouth every 6 hours as needed for pain   metoclopramide (REGLAN) 5 MG tablet   No No   Sig: Take 1 tablet (5 mg) by mouth 3 times daily as needed (nausea)      Facility-Administered Medications: None       ALLERGIES:  No Known Allergies    FAMILY HISTORY:  Family History   Family history unknown: Yes       SOCIAL HISTORY:  Social History     Substance Use Topics    Alcohol use: Not Currently    Drug use: Not Currently        VITALS:  Patient Vitals for the past 24 hrs:   BP Temp Temp src Pulse Resp SpO2 Height Weight   01/30/25 1930 119/68 -- -- 80 -- 99 % -- --   01/30/25 1925 121/76 -- -- 79 -- 99 % -- --   01/30/25 1830 135/69 98  F (36.7  C) Oral 94 16 100 % 1.676 m (5' 6\") 102.1 kg (225 lb)       PHYSICAL EXAM    General Appearance: Well-appearing, well-nourished, no acute distress   Head:  Normocephalic  ENT:  Lips, mucosa, and tongue normal; membranes are moist without pallor. Mild uvular edema.  No swelling of the tongue lips  Neck:  Supple, no stridor  Cardio:  Regular rate and rhythm  Pulm:  No respiratory distress, clear to auscultation " bilaterally  Extremities: Normal gait  Skin:  Skin warm, dry, no rashes  Neuro:  Alert and oriented ×3, moving all extremities, no gross sensory defects     RADIOLOGY/LABS:  Reviewed all pertinent imaging. Please see official radiology report. All pertinent labs reviewed and interpreted.             The creation of this record is based on the scribe s observations of the work being performed by Malka Vences MD and the provider s statements to them. It was created on her behalf by Robert Jay, a trained medical scribe. This document has been checked and approved by the attending provider.    Malka Vences MD  Emergency Medicine  [facility] EMERGENCY DEPARTMENT  [facility] [facility] [id number] [phone]  Dept: [phone]     Malka Vences MD  01/30/25 3068

## 2025-03-15 ENCOUNTER — HEALTH MAINTENANCE LETTER (OUTPATIENT)
Age: 35
End: 2025-03-15

## 2025-04-22 ENCOUNTER — ANESTHESIA EVENT (OUTPATIENT)
Dept: SURGERY | Facility: AMBULATORY SURGERY CENTER | Age: 35
End: 2025-04-22
Payer: COMMERCIAL

## 2025-04-23 ENCOUNTER — ANESTHESIA (OUTPATIENT)
Dept: SURGERY | Facility: AMBULATORY SURGERY CENTER | Age: 35
End: 2025-04-23
Payer: COMMERCIAL

## 2025-04-23 ENCOUNTER — HOSPITAL ENCOUNTER (OUTPATIENT)
Facility: AMBULATORY SURGERY CENTER | Age: 35
Discharge: HOME | End: 2025-04-23
Attending: OBSTETRICS & GYNECOLOGY
Payer: COMMERCIAL

## 2025-04-23 VITALS
BODY MASS INDEX: 34.78 KG/M2 | HEIGHT: 67 IN | WEIGHT: 221.6 LBS | TEMPERATURE: 96.8 F | RESPIRATION RATE: 16 BRPM | SYSTOLIC BLOOD PRESSURE: 95 MMHG | HEART RATE: 72 BPM | DIASTOLIC BLOOD PRESSURE: 53 MMHG | OXYGEN SATURATION: 94 %

## 2025-04-23 DIAGNOSIS — N92.0 MENORRHAGIA: ICD-10-CM

## 2025-04-23 DIAGNOSIS — N84.0 ENDOMETRIAL POLYP: ICD-10-CM

## 2025-04-23 LAB
HCG UR QL: NEGATIVE
INTERNAL QC OK POCT: NORMAL
POCT KIT EXPIRATION DATE: NORMAL
POCT KIT LOT NUMBER: NORMAL

## 2025-04-23 RX ORDER — SODIUM CHLORIDE, SODIUM LACTATE, POTASSIUM CHLORIDE, CALCIUM CHLORIDE 600; 310; 30; 20 MG/100ML; MG/100ML; MG/100ML; MG/100ML
INJECTION, SOLUTION INTRAVENOUS CONTINUOUS
Status: DISCONTINUED | OUTPATIENT
Start: 2025-04-23 | End: 2025-04-24 | Stop reason: HOSPADM

## 2025-04-23 RX ORDER — LIDOCAINE HYDROCHLORIDE 20 MG/ML
INJECTION, SOLUTION INFILTRATION; PERINEURAL PRN
Status: DISCONTINUED | OUTPATIENT
Start: 2025-04-23 | End: 2025-04-23

## 2025-04-23 RX ORDER — ONDANSETRON 2 MG/ML
INJECTION INTRAMUSCULAR; INTRAVENOUS PRN
Status: DISCONTINUED | OUTPATIENT
Start: 2025-04-23 | End: 2025-04-23

## 2025-04-23 RX ORDER — FENTANYL CITRATE 50 UG/ML
INJECTION, SOLUTION INTRAMUSCULAR; INTRAVENOUS PRN
Status: DISCONTINUED | OUTPATIENT
Start: 2025-04-23 | End: 2025-04-23

## 2025-04-23 RX ORDER — FENTANYL CITRATE 0.05 MG/ML
25 INJECTION, SOLUTION INTRAMUSCULAR; INTRAVENOUS
Status: DISCONTINUED | OUTPATIENT
Start: 2025-04-23 | End: 2025-04-24 | Stop reason: HOSPADM

## 2025-04-23 RX ORDER — OXYCODONE HYDROCHLORIDE 10 MG/1
10 TABLET ORAL
Status: DISCONTINUED | OUTPATIENT
Start: 2025-04-23 | End: 2025-04-24 | Stop reason: HOSPADM

## 2025-04-23 RX ORDER — ACETAMINOPHEN 325 MG/1
975 TABLET ORAL ONCE
Status: COMPLETED | OUTPATIENT
Start: 2025-04-23 | End: 2025-04-23

## 2025-04-23 RX ORDER — ACETAMINOPHEN 325 MG/1
975 TABLET ORAL ONCE
Status: DISCONTINUED | OUTPATIENT
Start: 2025-04-23 | End: 2025-04-24 | Stop reason: HOSPADM

## 2025-04-23 RX ORDER — ONDANSETRON 2 MG/ML
4 INJECTION INTRAMUSCULAR; INTRAVENOUS EVERY 30 MIN PRN
Status: DISCONTINUED | OUTPATIENT
Start: 2025-04-23 | End: 2025-04-24 | Stop reason: HOSPADM

## 2025-04-23 RX ORDER — LIDOCAINE HYDROCHLORIDE 10 MG/ML
INJECTION, SOLUTION EPIDURAL; INFILTRATION; INTRACAUDAL; PERINEURAL PRN
Status: DISCONTINUED | OUTPATIENT
Start: 2025-04-23 | End: 2025-04-23 | Stop reason: HOSPADM

## 2025-04-23 RX ORDER — OXYCODONE HYDROCHLORIDE 5 MG/1
5 TABLET ORAL
Status: DISCONTINUED | OUTPATIENT
Start: 2025-04-23 | End: 2025-04-24 | Stop reason: HOSPADM

## 2025-04-23 RX ORDER — PROPOFOL 10 MG/ML
INJECTION, EMULSION INTRAVENOUS PRN
Status: DISCONTINUED | OUTPATIENT
Start: 2025-04-23 | End: 2025-04-23

## 2025-04-23 RX ORDER — NALOXONE HYDROCHLORIDE 0.4 MG/ML
0.1 INJECTION, SOLUTION INTRAMUSCULAR; INTRAVENOUS; SUBCUTANEOUS
Status: DISCONTINUED | OUTPATIENT
Start: 2025-04-23 | End: 2025-04-24 | Stop reason: HOSPADM

## 2025-04-23 RX ORDER — DEXAMETHASONE SODIUM PHOSPHATE 4 MG/ML
INJECTION, SOLUTION INTRA-ARTICULAR; INTRALESIONAL; INTRAMUSCULAR; INTRAVENOUS; SOFT TISSUE PRN
Status: DISCONTINUED | OUTPATIENT
Start: 2025-04-23 | End: 2025-04-23

## 2025-04-23 RX ORDER — LIDOCAINE 40 MG/G
CREAM TOPICAL
Status: DISCONTINUED | OUTPATIENT
Start: 2025-04-23 | End: 2025-04-24 | Stop reason: HOSPADM

## 2025-04-23 RX ORDER — IBUPROFEN 800 MG/1
800 TABLET, FILM COATED ORAL ONCE
Status: DISCONTINUED | OUTPATIENT
Start: 2025-04-23 | End: 2025-04-24 | Stop reason: HOSPADM

## 2025-04-23 RX ORDER — GLYCOPYRROLATE 0.2 MG/ML
INJECTION, SOLUTION INTRAMUSCULAR; INTRAVENOUS PRN
Status: DISCONTINUED | OUTPATIENT
Start: 2025-04-23 | End: 2025-04-23

## 2025-04-23 RX ORDER — DEXAMETHASONE SODIUM PHOSPHATE 4 MG/ML
4 INJECTION, SOLUTION INTRA-ARTICULAR; INTRALESIONAL; INTRAMUSCULAR; INTRAVENOUS; SOFT TISSUE
Status: DISCONTINUED | OUTPATIENT
Start: 2025-04-23 | End: 2025-04-24 | Stop reason: HOSPADM

## 2025-04-23 RX ORDER — PROPOFOL 10 MG/ML
INJECTION, EMULSION INTRAVENOUS CONTINUOUS PRN
Status: DISCONTINUED | OUTPATIENT
Start: 2025-04-23 | End: 2025-04-23

## 2025-04-23 RX ORDER — ONDANSETRON 4 MG/1
4 TABLET, ORALLY DISINTEGRATING ORAL EVERY 30 MIN PRN
Status: DISCONTINUED | OUTPATIENT
Start: 2025-04-23 | End: 2025-04-24 | Stop reason: HOSPADM

## 2025-04-23 RX ORDER — KETOROLAC TROMETHAMINE 30 MG/ML
INJECTION, SOLUTION INTRAMUSCULAR; INTRAVENOUS PRN
Status: DISCONTINUED | OUTPATIENT
Start: 2025-04-23 | End: 2025-04-23

## 2025-04-23 RX ADMIN — PROPOFOL 200 MCG/KG/MIN: 10 INJECTION, EMULSION INTRAVENOUS at 14:31

## 2025-04-23 RX ADMIN — DEXAMETHASONE SODIUM PHOSPHATE 4 MG: 4 INJECTION, SOLUTION INTRA-ARTICULAR; INTRALESIONAL; INTRAMUSCULAR; INTRAVENOUS; SOFT TISSUE at 14:36

## 2025-04-23 RX ADMIN — FENTANYL CITRATE 25 MCG: 50 INJECTION, SOLUTION INTRAMUSCULAR; INTRAVENOUS at 14:33

## 2025-04-23 RX ADMIN — ONDANSETRON 4 MG: 2 INJECTION INTRAMUSCULAR; INTRAVENOUS at 14:36

## 2025-04-23 RX ADMIN — KETOROLAC TROMETHAMINE 15 MG: 30 INJECTION, SOLUTION INTRAMUSCULAR; INTRAVENOUS at 14:45

## 2025-04-23 RX ADMIN — SODIUM CHLORIDE, SODIUM LACTATE, POTASSIUM CHLORIDE, CALCIUM CHLORIDE: 600; 310; 30; 20 INJECTION, SOLUTION INTRAVENOUS at 14:05

## 2025-04-23 RX ADMIN — FENTANYL CITRATE 25 MCG: 50 INJECTION, SOLUTION INTRAMUSCULAR; INTRAVENOUS at 14:35

## 2025-04-23 RX ADMIN — FENTANYL CITRATE 50 MCG: 50 INJECTION, SOLUTION INTRAMUSCULAR; INTRAVENOUS at 14:31

## 2025-04-23 RX ADMIN — ACETAMINOPHEN 975 MG: 325 TABLET ORAL at 14:00

## 2025-04-23 RX ADMIN — LIDOCAINE HYDROCHLORIDE 2 ML: 20 INJECTION, SOLUTION INFILTRATION; PERINEURAL at 14:31

## 2025-04-23 RX ADMIN — PROPOFOL 50 MG: 10 INJECTION, EMULSION INTRAVENOUS at 14:31

## 2025-04-23 RX ADMIN — GLYCOPYRROLATE 0.2 MG: 0.2 INJECTION, SOLUTION INTRAMUSCULAR; INTRAVENOUS at 14:31

## 2025-04-23 RX ADMIN — PROPOFOL 50 MG: 10 INJECTION, EMULSION INTRAVENOUS at 14:27

## 2025-04-23 ASSESSMENT — LIFESTYLE VARIABLES: TOBACCO_USE: 1

## 2025-04-23 NOTE — PROGRESS NOTES
Pt tolerating po intake. B/p stable. Had slight dizziness on ambulation, but tolerated ambulation well.

## 2025-04-23 NOTE — OP NOTE
Hysteroscopy, D&C    [facility]      Name: Sobeida Fountain   MRN: [id number]   DATE OF SERVICE:  4/23/2025     PREOPERATIVE DIAGNOSIS: uterine polyp    POSTOPERATIVE DIAGNOSIS: uterine polyp    PROCEDURES: Hysteroscopy, dilatation and curettage,     SURGEON: Danette Coyle MD     ASSISTANT: none      ANESTHESIA:  general and mac      ESTIMATED BLOOD LOSS:   5 cc      HISTORY OF PRESENT ILLNESS:  This is a 34 year old year old female with history of uterine polyp. She had a transvaginal ultrasound which showed polyp. She was given informed consent for the above procedure. The risks, benefits and alternatives were discussed with her. She expressed understanding and wished to proceed.       PROCEDURE:  The patient was brought to the operating room and after induction of general anesthesia was prepped and draped in the dorsal lithotomy position. A time out was called and the patient and the procedure were verified.       A sterile speculum was placed. The anterior lip of the cervix were grasped with single tooth tenaculum. 1% lidocaine was injected at the 4 and 8 o'clock positions. The cervix was gently dilated using Meenakshi dilators and the hysteroscope was introduced. Cavity of the uterus was noted to have a polyp.. At this point a myosure lite was placed and polyp removed and the tissue was submitted for pathologic exam. At this point good hemostasis was noted with this portion of the procedure. Instruments were removed from vagina. No active bleeding was noted. Sponge and needle counts were correct X 2. She was taken to recovery in stable condition        Danette Coyle MD       cc: Danette Coyle MD

## 2025-04-23 NOTE — DISCHARGE INSTRUCTIONS
You received 975 mg of acetaminophen (Tylenol) at 2:00 PM. Please do not take an additional dose of Tylenol until after 8:00 PM.    Do not exceed 4,000 mg of acetaminophen in a 24 hour period, keep in mind that acetaminophen can also be found in many over-the-counter cold medications as well as narcotics that may be given for pain.    You received a medication called Toradol (a stronger IV ibuprofen) at 2:45. Do NOT take any Ibuprofen / Advil / Aleve / Naproxen or products containing Ibuprofen until 8:45 pm or later.     If you have any questions or concerns regarding your procedure please contact Dr. Coyle, her office number is [phone]     Adult Discharge Orders & Instructions     For 24 hours after surgery    Get plenty of rest.  A responsible adult must stay with you for at least 24 hours after you leave the hospital.   Do not drive or use heavy equipment.  If you have weakness or tingling, don't drive or use heavy equipment until this feeling goes away.  Do not drink alcohol.  Avoid strenuous or risky activities.  Ask for help when climbing stairs.   You may feel lightheaded.  IF so, sit for a few minutes before standing.  Have someone help you get up.   If you have nausea (feel sick to your stomach): Drink only clear liquids such as apple juice, ginger ale, broth or 7-Up.  Rest may also help.  Be sure to drink enough fluids.  Move to a regular diet as you feel able.  You may have a slight fever. Call the doctor if your fever is over 100 F (37.7 C) (taken under the tongue) or lasts longer than 24 hours.  You may have a dry mouth, a sore throat, muscle aches or trouble sleeping.  These should go away after 24 hours.  Do not make important or legal decisions.     Call your doctor for any of the followin.  Signs of infection (fever, growing tenderness at the surgery site, a large amount of drainage or bleeding, severe pain, foul-smelling drainage, redness, swelling).    2. It has been over 8 to 10  hours since surgery and you are still not able to urinate (pass water).    3.  Headache for over 24 hours.

## 2025-04-23 NOTE — ANESTHESIA CARE TRANSFER NOTE
Patient: Sobeida Fountain    Procedure: Procedure(s):  HYSTEROSCOPY, POLYPECTOMY       Diagnosis: Endometrial polyp [N84.0]  Menorrhagia [N92.0]  Diagnosis Additional Information: No value filed.    Anesthesia Type:   MAC     Note:    Oropharynx: oropharynx clear of all foreign objects  Level of Consciousness: drowsy and awake  Oxygen Supplementation: face mask  Level of Supplemental Oxygen (L/min / FiO2): 8  Independent Airway: airway patency satisfactory and stable  Dentition: dentition unchanged  Vital Signs Stable: post-procedure vital signs reviewed and stable  Report to RN Given: handoff report given  Patient transferred to: Phase II    Handoff Report: Identifed the Patient, Identified the Reponsible Provider, Reviewed the pertinent medical history, Discussed the surgical course, Reviewed Intra-OP anesthesia mangement and issues during anesthesia, Set expectations for post-procedure period and Allowed opportunity for questions and acknowledgement of understanding      Vitals:  Vitals Value Taken Time   /60    Temp 37C    Pulse 70    Resp 14    SpO2 99        Electronically Signed By: ERIC De Leon CRNA  April 23, 2025  2:55 PM

## 2025-04-23 NOTE — ANESTHESIA POSTPROCEDURE EVALUATION
Patient: Sobeida Fountain    Procedure: Procedure(s):  HYSTEROSCOPY, POLYPECTOMY       Anesthesia Type:  MAC    Note:  Disposition: Outpatient   Postop Pain Control: Uneventful            Sign Out: Well controlled pain   PONV: No   Neuro/Psych: Uneventful            Sign Out: Acceptable/Baseline neuro status   Airway/Respiratory: Uneventful            Sign Out: Acceptable/Baseline resp. status   CV/Hemodynamics: Uneventful            Sign Out: Acceptable CV status; No obvious hypovolemia; No obvious fluid overload   Other NRE: NONE   DID A NON-ROUTINE EVENT OCCUR? No           Last vitals:  Vitals Value Taken Time   BP 86/54 04/23/25 1515   Temp 96.8  F (36  C) 04/23/25 1454   Pulse 72 04/23/25 1515   Resp 16 04/23/25 1454   SpO2 93 % 04/23/25 1515   Vitals shown include unfiled device data.    Electronically Signed By: Andrea Lianez MD  April 23, 2025  3:17 PM   no

## 2025-04-23 NOTE — ANESTHESIA PREPROCEDURE EVALUATION
Anesthesia Pre-Procedure Evaluation    Patient: Sobeida Fonutain   MRN: [id number] : 1990        Procedure : Procedure(s):  HYSTEROSCOPY, POLYPECTOMY          Past Medical History:   Diagnosis Date    Depressive disorder     both during and after    Irregular heart beat     Motion sickness     Obese       Past Surgical History:   Procedure Laterality Date     SECTION N/A 2020    Procedure:  SECTION;  Surgeon: Olinda Packer MD;  Location: [facility] L[facility]    LAPAROSCOPIC CHOLECYSTECTOMY N/A 2023    Procedure: CHOLECYSTECTOMY, LAPAROSCOPIC;  Surgeon: Markell Watters DO;  Location: [facility] OR      Allergies   Allergen Reactions    Food Anaphylaxis     Garbonzo beans and pea protein caused full anaphlaxis with throat swelling    Pea Protein Concentrate [Pea] Anaphylaxis     Throat closes      Social History     Tobacco Use    Smoking status: Some Days     Types: Other    Smokeless tobacco: Never   Substance Use Topics    Alcohol use: Not Currently      Wt Readings from Last 1 Encounters:   25 100.5 kg (221 lb 9.6 oz)        Anesthesia Evaluation   Pt has had prior anesthetic.     No history of anesthetic complications       ROS/MED HX  ENT/Pulmonary:  - neg pulmonary ROS   (+)                tobacco use, Current use,                       Neurologic:  - neg neurologic ROS     Cardiovascular:  - neg cardiovascular ROS     METS/Exercise Tolerance:     Hematologic:  - neg hematologic  ROS     Musculoskeletal:  - neg musculoskeletal ROS     GI/Hepatic:  - neg GI/hepatic ROS     Renal/Genitourinary:  - neg Renal ROS     Endo:  - neg endo ROS   (+)               Obesity (bmi 35),       Psychiatric/Substance Use:  - neg psychiatric ROS   (+)     Recreational drug usage: Cannabis.    Infectious Disease:  - neg infectious disease ROS     Malignancy:  - neg malignancy ROS     Other:  - neg other ROS          Physical Exam    Airway        Mallampati: II   TM distance: > 3 FB  "  Neck ROM: full   Mouth opening: > 3 cm    Respiratory Devices and Support         Dental       (+) Minor Abnormalities - some fillings, tiny chips      Cardiovascular   cardiovascular exam normal          Pulmonary   pulmonary exam normal        breath sounds clear to auscultation           OUTSIDE LABS:  CBC:   Lab Results   Component Value Date    WBC 9.4 10/03/2023    WBC 10.0 09/16/2023    HGB 12.1 10/03/2023    HGB 14.3 09/16/2023    HCT 37.1 10/03/2023    HCT 44.9 09/16/2023     10/03/2023     09/16/2023     BMP:   Lab Results   Component Value Date     07/18/2024     10/02/2023    POTASSIUM 3.7 07/18/2024    POTASSIUM 3.6 10/02/2023    CHLORIDE 106 07/18/2024    CHLORIDE 103 10/02/2023    CO2 24 07/18/2024    CO2 24 10/02/2023    BUN 9.9 07/18/2024    BUN 12.2 10/02/2023    CR 0.74 07/18/2024    CR 0.83 10/02/2023    GLC 90 07/18/2024    GLC 84 10/02/2023     COAGS: No results found for: \"PTT\", \"INR\", \"FIBR\"  POC:   Lab Results   Component Value Date    BGM 78 02/26/2020    HCGS Negative 09/16/2023     HEPATIC:   Lab Results   Component Value Date    ALBUMIN 4.3 07/18/2024    PROTTOTAL 7.7 07/18/2024    ALT 8 07/18/2024    AST 15 07/18/2024    ALKPHOS 75 07/18/2024    BILITOTAL 0.3 07/18/2024     OTHER:   Lab Results   Component Value Date    LACT 1.3 10/02/2023    SHADY 8.9 07/18/2024    LIPASE 28 10/02/2023       Anesthesia Plan    ASA Status:  2    NPO Status:  NPO Appropriate    Anesthesia Type: MAC.     - Reason for MAC: immobility needed, straight local not clinically adequate   Induction: Propofol.   Maintenance: TIVA.        Consents    Anesthesia Plan(s) and associated risks, benefits, and realistic alternatives discussed. Questions answered and patient/representative(s) expressed understanding.     - Discussed:     - Discussed with:  Patient            Postoperative Care    Pain management: Multi-modal analgesia.   PONV prophylaxis: Ondansetron (or other 5HT-3), " "Dexamethasone or Solumedrol     Comments:    Other Comments: Toradol if OK with surgeon    Reviewed anesthetic options and risks. Patient agrees to proceed.            Andrea Lainez MD    Clinically Significant Risk Factors Present on Admission                             # Obesity: Estimated body mass index is 35.23 kg/m  as calculated from the following:    Height as of this encounter: 1.689 m (5' 6.5\").    Weight as of this encounter: 100.5 kg (221 lb 9.6 oz).                "

## 2025-07-07 NOTE — PROVIDER NOTIFICATION
03/08/20 1811   Provider Notification   Provider Name/Title Dr. Fitzpatrick   Method of Notification In Department   Notification Reason Other (Comment)   Left foot swollen, pt states she noticed in the last hour or so. Denies pain/tenderness in calf, pulses palpable, no redness. Denies HA, visual changes, RUQ pain and SOB. VSS. Dr. Fitzpatrick notified, Dr. Myhre in to see after sign out report.   No

## (undated) DEVICE — SUTURE PASSOR W/GUIDE RSG-14F-4-WG

## (undated) DEVICE — SOL WATER IRRIG 1000ML BOTTLE 2F7114

## (undated) DEVICE — CUSTOM PACK LAP CHOLE SBA5BLCHEA

## (undated) DEVICE — PREP CHLORAPREP 26ML TINTED ORANGE  260815

## (undated) DEVICE — ENDO SHEARS RENEW LAP ENDOCUT SCISSOR TIP 16.5MM 3142

## (undated) DEVICE — SOL NACL 0.9% IRRIG 1000ML BOTTLE 07138-09

## (undated) DEVICE — DECANTER VIAL 2006S

## (undated) DEVICE — TUBING SMOKE EVAC PNEUMOCLEAR HIGH FLOW 0620050250

## (undated) DEVICE — BLADE KNIFE SURG 11 371111

## (undated) DEVICE — SOL NACL 0.9% IRRIG 1000ML BOTTLE 2F7124

## (undated) DEVICE — ENDO TROCAR FIRST ENTRY KII FIOS Z-THRD 05X100MM CTF03

## (undated) DEVICE — CATH TRAY FOLEY SURESTEP 16FR WDRAIN BAG STLK LATEX A300316A

## (undated) DEVICE — PACK C-SECTION LF PL15OTA83B

## (undated) DEVICE — SURGICEL POWDER ABSORBABLE HEMOSTAT 3GM 3013SP

## (undated) DEVICE — CLIP LIGACLIP LG YELLOW LT400

## (undated) DEVICE — PLATE GROUNDING ADULT W/CORD 9165L

## (undated) DEVICE — SUCTION CANISTER MEDIVAC LINER 1500ML W/LID 65651-515

## (undated) DEVICE — ENDO TROCAR FIRST ENTRY KII FIOS Z-THRD 11X100MM CTF33

## (undated) DEVICE — SUCTION MANIFOLD NEPTUNE 2 SYS 1 PORT 702-025-000

## (undated) DEVICE — SOL WATER IRRIG 1000ML BOTTLE 07139-09

## (undated) DEVICE — STRAP KNEE/BODY 31143004

## (undated) DEVICE — SU MONOCRYL+ 4-0 18IN PS2 UND MCP496G

## (undated) DEVICE — ENDO POUCH UNIV RETRIEVAL SYSTEM INZII 10MM CD001

## (undated) DEVICE — BASIN SET MAJOR

## (undated) DEVICE — BASIN EMESIS STERILE  SSK9005A

## (undated) DEVICE — GLOVE BIOGEL PI ORTHOPRO SZ 7.5 47675

## (undated) DEVICE — Device

## (undated) DEVICE — STOCKING SLEEVE COMPRESSION CALF LG

## (undated) DEVICE — APPLICATOR ENDOSCOPIC 5 SURGICEL POWDER 3123SPEA

## (undated) DEVICE — DRSG STERI STRIP 1/2X4" R1547

## (undated) DEVICE — ENDO TROCAR SLEEVE KII Z-THREADED 05X100MM CTS02

## (undated) DEVICE — DRSG STERI STRIP 1/4X3" R1541

## (undated) DEVICE — NDL INSUFFLATION 13GA 120MM C2201

## (undated) DEVICE — PREP CHLORAPREP 26ML TINTED HI-LITE ORANGE 930815

## (undated) RX ORDER — PHENYLEPHRINE HCL IN 0.9% NACL 1 MG/10 ML
SYRINGE (ML) INTRAVENOUS
Status: DISPENSED
Start: 2020-04-17

## (undated) RX ORDER — KETOROLAC TROMETHAMINE 30 MG/ML
INJECTION, SOLUTION INTRAMUSCULAR; INTRAVENOUS
Status: DISPENSED
Start: 2020-04-17

## (undated) RX ORDER — ONDANSETRON 2 MG/ML
INJECTION INTRAMUSCULAR; INTRAVENOUS
Status: DISPENSED
Start: 2023-09-16

## (undated) RX ORDER — LIDOCAINE HYDROCHLORIDE 10 MG/ML
INJECTION, SOLUTION EPIDURAL; INFILTRATION; INTRACAUDAL; PERINEURAL
Status: DISPENSED
Start: 2023-09-16

## (undated) RX ORDER — FENTANYL CITRATE 50 UG/ML
INJECTION, SOLUTION INTRAMUSCULAR; INTRAVENOUS
Status: DISPENSED
Start: 2020-04-17

## (undated) RX ORDER — CEFAZOLIN SODIUM 1 G/3ML
INJECTION, POWDER, FOR SOLUTION INTRAMUSCULAR; INTRAVENOUS
Status: DISPENSED
Start: 2023-09-16

## (undated) RX ORDER — FENTANYL CITRATE 50 UG/ML
INJECTION, SOLUTION INTRAMUSCULAR; INTRAVENOUS
Status: DISPENSED
Start: 2023-09-16

## (undated) RX ORDER — CEFAZOLIN SODIUM 1 G/3ML
INJECTION, POWDER, FOR SOLUTION INTRAMUSCULAR; INTRAVENOUS
Status: DISPENSED
Start: 2020-04-17

## (undated) RX ORDER — ACETAMINOPHEN 325 MG/1
TABLET ORAL
Status: DISPENSED
Start: 2020-04-17

## (undated) RX ORDER — ONDANSETRON 2 MG/ML
INJECTION INTRAMUSCULAR; INTRAVENOUS
Status: DISPENSED
Start: 2020-04-17

## (undated) RX ORDER — MORPHINE SULFATE 1 MG/ML
INJECTION, SOLUTION EPIDURAL; INTRATHECAL; INTRAVENOUS
Status: DISPENSED
Start: 2020-04-17

## (undated) RX ORDER — BUPIVACAINE HYDROCHLORIDE 2.5 MG/ML
INJECTION, SOLUTION EPIDURAL; INFILTRATION; INTRACAUDAL
Status: DISPENSED
Start: 2023-09-16

## (undated) RX ORDER — PROPOFOL 10 MG/ML
INJECTION, EMULSION INTRAVENOUS
Status: DISPENSED
Start: 2023-09-16